# Patient Record
Sex: MALE | Race: WHITE | Employment: OTHER | URBAN - METROPOLITAN AREA
[De-identification: names, ages, dates, MRNs, and addresses within clinical notes are randomized per-mention and may not be internally consistent; named-entity substitution may affect disease eponyms.]

---

## 2022-10-25 ENCOUNTER — TELEPHONE (OUTPATIENT)
Dept: PRIMARY CARE CLINIC | Age: 52
End: 2022-10-25

## 2022-10-25 ENCOUNTER — OFFICE VISIT (OUTPATIENT)
Dept: PRIMARY CARE CLINIC | Age: 52
End: 2022-10-25
Payer: MEDICAID

## 2022-10-25 VITALS
HEART RATE: 67 BPM | TEMPERATURE: 98.4 F | RESPIRATION RATE: 20 BRPM | SYSTOLIC BLOOD PRESSURE: 138 MMHG | DIASTOLIC BLOOD PRESSURE: 82 MMHG | OXYGEN SATURATION: 99 %

## 2022-10-25 DIAGNOSIS — Z79.4 TYPE 2 DIABETES MELLITUS WITH DIABETIC POLYNEUROPATHY, WITH LONG-TERM CURRENT USE OF INSULIN (HCC): ICD-10-CM

## 2022-10-25 DIAGNOSIS — Z79.4 TYPE 2 DIABETES MELLITUS WITH DIABETIC POLYNEUROPATHY, WITH LONG-TERM CURRENT USE OF INSULIN (HCC): Primary | ICD-10-CM

## 2022-10-25 DIAGNOSIS — E11.42 TYPE 2 DIABETES MELLITUS WITH DIABETIC POLYNEUROPATHY, WITH LONG-TERM CURRENT USE OF INSULIN (HCC): ICD-10-CM

## 2022-10-25 DIAGNOSIS — Z23 NEEDS FLU SHOT: ICD-10-CM

## 2022-10-25 DIAGNOSIS — Z23 NEED FOR SHINGLES VACCINE: ICD-10-CM

## 2022-10-25 DIAGNOSIS — Z11.59 NEED FOR HEPATITIS C SCREENING TEST: ICD-10-CM

## 2022-10-25 DIAGNOSIS — Z11.4 ENCOUNTER FOR SCREENING FOR HIV: ICD-10-CM

## 2022-10-25 DIAGNOSIS — Z13.220 LIPID SCREENING: ICD-10-CM

## 2022-10-25 DIAGNOSIS — B99.9 ANEMIA OF INFECTION AND CHRONIC DISEASE: ICD-10-CM

## 2022-10-25 DIAGNOSIS — Z97.0 EYE GLOBE PROSTHESIS: ICD-10-CM

## 2022-10-25 DIAGNOSIS — Z76.89 ENCOUNTER TO ESTABLISH CARE: Primary | ICD-10-CM

## 2022-10-25 DIAGNOSIS — E11.42 TYPE 2 DIABETES MELLITUS WITH DIABETIC POLYNEUROPATHY, WITH LONG-TERM CURRENT USE OF INSULIN (HCC): Primary | ICD-10-CM

## 2022-10-25 DIAGNOSIS — D63.8 ANEMIA OF INFECTION AND CHRONIC DISEASE: ICD-10-CM

## 2022-10-25 DIAGNOSIS — M86.19 OTHER ACUTE OSTEOMYELITIS, MULTIPLE SITES (HCC): ICD-10-CM

## 2022-10-25 PROBLEM — S88.111A BELOW-KNEE AMPUTATION OF RIGHT LOWER EXTREMITY (HCC): Status: ACTIVE | Noted: 2021-12-16

## 2022-10-25 PROBLEM — N52.1 ERECTILE DYSFUNCTION DUE TO DIABETES MELLITUS (HCC): Status: ACTIVE | Noted: 2021-02-18

## 2022-10-25 PROBLEM — F19.10 POLYSUBSTANCE ABUSE (HCC): Status: ACTIVE | Noted: 2021-05-18

## 2022-10-25 PROBLEM — N18.6 ESRD (END STAGE RENAL DISEASE) (HCC): Status: ACTIVE | Noted: 2022-03-11

## 2022-10-25 PROBLEM — F17.210 NICOTINE DEPENDENCE, CIGARETTES, UNCOMPLICATED: Status: ACTIVE | Noted: 2021-01-19

## 2022-10-25 PROBLEM — E11.9 TYPE 2 DIABETES MELLITUS (HCC): Status: ACTIVE | Noted: 2018-12-31

## 2022-10-25 PROBLEM — E11.69 ERECTILE DYSFUNCTION DUE TO DIABETES MELLITUS (HCC): Status: ACTIVE | Noted: 2021-02-18

## 2022-10-25 PROBLEM — S88.112A BELOW-KNEE AMPUTATION OF LEFT LOWER EXTREMITY (HCC): Status: ACTIVE | Noted: 2022-04-21

## 2022-10-25 PROBLEM — F31.70 BIPOLAR I DISORDER IN REMISSION (HCC): Status: ACTIVE | Noted: 2021-09-20

## 2022-10-25 PROBLEM — J44.9 CHRONIC OBSTRUCTIVE PULMONARY DISEASE (HCC): Status: ACTIVE | Noted: 2019-09-23

## 2022-10-25 PROBLEM — R62.7 FAILURE TO THRIVE IN ADULT: Status: ACTIVE | Noted: 2022-03-11

## 2022-10-25 PROBLEM — B18.2 CHRONIC HEPATITIS C WITHOUT HEPATIC COMA (HCC): Status: ACTIVE | Noted: 2020-03-16

## 2022-10-25 PROCEDURE — 90472 IMMUNIZATION ADMIN EACH ADD: CPT

## 2022-10-25 PROCEDURE — 3017F COLORECTAL CA SCREEN DOC REV: CPT

## 2022-10-25 PROCEDURE — 3046F HEMOGLOBIN A1C LEVEL >9.0%: CPT

## 2022-10-25 PROCEDURE — G8421 BMI NOT CALCULATED: HCPCS

## 2022-10-25 PROCEDURE — 90750 HZV VACC RECOMBINANT IM: CPT

## 2022-10-25 PROCEDURE — 99204 OFFICE O/P NEW MOD 45 MIN: CPT

## 2022-10-25 PROCEDURE — 90471 IMMUNIZATION ADMIN: CPT

## 2022-10-25 PROCEDURE — 90674 CCIIV4 VAC NO PRSV 0.5 ML IM: CPT

## 2022-10-25 PROCEDURE — 2022F DILAT RTA XM EVC RTNOPTHY: CPT

## 2022-10-25 PROCEDURE — 4004F PT TOBACCO SCREEN RCVD TLK: CPT

## 2022-10-25 PROCEDURE — G8427 DOCREV CUR MEDS BY ELIG CLIN: HCPCS

## 2022-10-25 PROCEDURE — G8482 FLU IMMUNIZE ORDER/ADMIN: HCPCS

## 2022-10-25 RX ORDER — TAMSULOSIN HYDROCHLORIDE 0.4 MG/1
0.4 CAPSULE ORAL DAILY
COMMUNITY
Start: 2021-02-11 | End: 2022-11-21 | Stop reason: SDUPTHER

## 2022-10-25 RX ORDER — PREGABALIN 150 MG/1
150 CAPSULE ORAL 2 TIMES DAILY
COMMUNITY
Start: 2022-09-30 | End: 2022-11-21 | Stop reason: SDUPTHER

## 2022-10-25 RX ORDER — INSULIN LISPRO 100 [IU]/ML
8 INJECTION, SOLUTION INTRAVENOUS; SUBCUTANEOUS
Qty: 43.2 ML | Refills: 0 | Status: SHIPPED | OUTPATIENT
Start: 2022-10-25 | End: 2022-11-16 | Stop reason: SDUPTHER

## 2022-10-25 RX ORDER — FLASH GLUCOSE SENSOR
1 KIT MISCELLANEOUS 4 TIMES DAILY
Qty: 1 EACH | Refills: 12 | Status: SHIPPED | OUTPATIENT
Start: 2022-10-25 | End: 2022-11-16 | Stop reason: SDUPTHER

## 2022-10-25 RX ORDER — BUPRENORPHINE HYDROCHLORIDE AND NALOXONE HYDROCHLORIDE DIHYDRATE 8; 2 MG/1; MG/1
1 TABLET SUBLINGUAL 2 TIMES DAILY
COMMUNITY
Start: 2022-09-20

## 2022-10-25 RX ORDER — ROPINIROLE 2 MG/1
2 TABLET, FILM COATED, EXTENDED RELEASE ORAL NIGHTLY
COMMUNITY

## 2022-10-25 RX ORDER — INSULIN GLARGINE 100 [IU]/ML
18 INJECTION, SOLUTION SUBCUTANEOUS NIGHTLY
Qty: 5 ADJUSTABLE DOSE PRE-FILLED PEN SYRINGE | Refills: 3 | Status: SHIPPED | OUTPATIENT
Start: 2022-10-25 | End: 2022-11-16 | Stop reason: SDUPTHER

## 2022-10-25 ASSESSMENT — ENCOUNTER SYMPTOMS
SHORTNESS OF BREATH: 0
COUGH: 0
DIARRHEA: 0
CHEST TIGHTNESS: 0
COLOR CHANGE: 1
VOMITING: 0
ABDOMINAL PAIN: 0
CONSTIPATION: 0
WHEEZING: 0
BLOOD IN STOOL: 0
NAUSEA: 0

## 2022-10-25 ASSESSMENT — PATIENT HEALTH QUESTIONNAIRE - PHQ9
1. LITTLE INTEREST OR PLEASURE IN DOING THINGS: 0
SUM OF ALL RESPONSES TO PHQ QUESTIONS 1-9: 0
2. FEELING DOWN, DEPRESSED OR HOPELESS: 0
SUM OF ALL RESPONSES TO PHQ QUESTIONS 1-9: 0
SUM OF ALL RESPONSES TO PHQ9 QUESTIONS 1 & 2: 0

## 2022-10-25 NOTE — TELEPHONE ENCOUNTER
[address] is requesting Rx for 5 mm pen needles. Pharmacist states she is dispensing #100 pen needles to pt due to Rx for Humalog and Lantus being sent.

## 2022-10-25 NOTE — PROGRESS NOTES
Matt Escalante (:  1970) is a 47 y.o. male,New patient, here for evaluation of the following chief complaint(s):  Establish Care      HPI  Patient presents to establish care with new provider as well as for the following:  Diabetes - patient states has been off of diabetes medications for few months since abrupt move to area, as well as no longer has access to his BG monitor or supplies. Patient states needing prescriptions for these again for diabetes management. Last A1c in . with good control. Osteomyelitis - Patient reports hx osteomyelitis bilateral LEs, patient is double amputee with bilateral BKAs. Patient was getting IV Orbactiv infusions once weekly per [facility], but states recent infusions have been off schedule or missed altogether which he feels caused infection to get worse. Patient states bilateral stumps have been erythematous, hot, swollen, as well as having drainage and flaking of the skin. Patient denies fever, N/V/D and states he otherwise feels well. Flu shot - will administer today  Shingles shot - will administer today    ASSESSMENT/PLAN:  1. Encounter to establish care  2. Other acute osteomyelitis, multiple sites [facility])  -     [address] [place] [address], Prabhjot Murphy MD, Infectious Disease, [facility]  -     [address]  3. Type 2 diabetes mellitus with diabetic polyneuropathy, with long-term current use of [facility] ([facility]  Assessment & Plan:  Repeat A1c ordered - will f/u with result. Rx for Lantus and Humalog provided, new rx for BG monitor provided. Orders:  -     Hemoglobin A1C; Future  -     Continuous Blood Gluc Sensor (FREESTYLE MARCELLA 2 SENSOR) MISC; 1 each by Does not apply route 4 times daily, Disp-1 each, R-12Normal  -     insulin glargine (LANTUS SOLOSTAR) 100 UNIT/ML injection pen;  Inject 18 Units into the skin nightly, Disp-5 Adjustable Dose Pre-filled Pen Syringe, R-3Normal  -     insulin lispro, 1 Unit Dial, (HUMALOG KWIKPEN) 100 UNIT/ML SOPN; Inject 8 Units into the skin 3 times daily (before meals), Disp-43.2 mL, R-0Normal  4. Eye globe prosthesis  -     Genoveva Johnson MD, Ophthalmology, [facility]  5. Anemia of infection and chronic disease  -     CBC with Auto Differential; Future  -     Comprehensive Metabolic Panel; Future  6. Lipid screening  -     Lipid, Fasting; Future  7. Needs flu shot  -     Influenza, FLUCELVAX, (age 10 mo+), IM, PF, 0.5 mL  8. Need for shingles vaccine  -     Zoster, SHINGRIX, (18 yrs +), IM  9. Encounter for screening for HIV  -     HIV Screen; Future  10. Need for hepatitis C screening test  -     Hepatitis C Antibody; Future     /82   Pulse 67   Temp 98.4 °F (36.9 °C) (Oral)   Resp 20   SpO2 99%      SUBJECTIVE/OBJECTIVE:  Review of Systems   Constitutional:  Negative for chills, fatigue, fever and unexpected weight change. Eyes:         Right eye removed   Respiratory:  Negative for cough, chest tightness, shortness of breath and wheezing. Cardiovascular:  Negative for chest pain, palpitations and leg swelling. Gastrointestinal:  Negative for abdominal pain, blood in stool, constipation, diarrhea, nausea and vomiting. Skin:  Positive for color change (Erythema and swelling to bilateral stumps, +drainage). Neurological:  Negative for dizziness, weakness, light-headedness and headaches. Physical Exam  Vitals and nursing note reviewed. Constitutional:       General: He is not in acute distress. Appearance: Normal appearance. He is not ill-appearing. Cardiovascular:      Rate and Rhythm: Normal rate and regular rhythm. Pulses: Normal pulses. Heart sounds: Normal heart sounds. Pulmonary:      Effort: Pulmonary effort is normal.      Breath sounds: Normal breath sounds. Musculoskeletal:         General: Swelling (To stumps, +erythema) present. Comments: Bilateral BKA   Skin:     General: Skin is warm and dry. Capillary Refill: Capillary refill takes less than 2 seconds. Neurological:      Mental Status: He is alert and oriented to person, place, and time. Mental status is at baseline. Psychiatric:         Mood and Affect: Mood normal.         Behavior: Behavior normal.         Thought Content: Thought content normal.         Judgment: Judgment normal.       Current Outpatient Medications   Medication Sig Dispense Refill    pregabalin (LYRICA) 150 MG capsule Take 150 mg by mouth 2 times daily. buprenorphine-naloxone (SUBOXONE) 8-2 MG SUBL SL tablet Place 1 tablet under the tongue 2 times daily. tamsulosin (FLOMAX) 0.4 MG capsule Take 0.4 mg by mouth daily      Continuous Blood Gluc Sensor (FREESTYLE MARCELLA 2 SENSOR) MISC 1 each by Does not apply route 4 times daily 1 each 12    insulin glargine (LANTUS SOLOSTAR) 100 UNIT/ML injection pen Inject 18 Units into the skin nightly 5 Adjustable Dose Pre-filled Pen Syringe 3    insulin lispro, 1 Unit Dial, (HUMALOG KWIKPEN) 100 UNIT/ML SOPN Inject 8 Units into the skin 3 times daily (before meals) 43.2 mL 0    rOPINIRole (REQUIP XL) 2 MG extended release tablet Take 2 mg by mouth nightly       No current facility-administered medications for this visit. Return in about 3 months (around 1/25/2023), or if symptoms worsen or fail to improve, for HTN, diabetes.     Electronically signed by RG Byrd CNP on 10/25/2022 at 2:02 PM

## 2022-10-25 NOTE — ASSESSMENT & PLAN NOTE
Repeat A1c ordered - will f/u with result. Rx for Lantus and Humalog provided, new rx for BG monitor provided.

## 2022-10-26 ENCOUNTER — TELEPHONE (OUTPATIENT)
Dept: PRIMARY CARE CLINIC | Age: 52
End: 2022-10-26

## 2022-10-26 NOTE — TELEPHONE ENCOUNTER
[facility] is calling about referral that was sent over about the eye glove prosthesis they would like to know if it is for the whole or if it is just for the orbit  if it is the whole the pt would need to be referred to someone else

## 2022-10-28 DIAGNOSIS — E11.42 TYPE 2 DIABETES MELLITUS WITH DIABETIC POLYNEUROPATHY, WITH LONG-TERM CURRENT USE OF INSULIN (HCC): ICD-10-CM

## 2022-10-28 DIAGNOSIS — B99.9 ANEMIA OF INFECTION AND CHRONIC DISEASE: ICD-10-CM

## 2022-10-28 DIAGNOSIS — Z11.4 ENCOUNTER FOR SCREENING FOR HIV: ICD-10-CM

## 2022-10-28 DIAGNOSIS — Z11.59 NEED FOR HEPATITIS C SCREENING TEST: ICD-10-CM

## 2022-10-28 DIAGNOSIS — Z79.4 TYPE 2 DIABETES MELLITUS WITH DIABETIC POLYNEUROPATHY, WITH LONG-TERM CURRENT USE OF INSULIN (HCC): ICD-10-CM

## 2022-10-28 DIAGNOSIS — D63.8 ANEMIA OF INFECTION AND CHRONIC DISEASE: ICD-10-CM

## 2022-10-28 LAB
A/G RATIO: 0.8 (ref 1.1–2.2)
ALBUMIN SERPL-MCNC: 3 G/DL (ref 3.4–5)
ALP BLD-CCNC: 94 U/L (ref 40–129)
ALT SERPL-CCNC: 8 U/L (ref 10–40)
ANION GAP SERPL CALCULATED.3IONS-SCNC: 10 MMOL/L (ref 3–16)
AST SERPL-CCNC: 11 U/L (ref 15–37)
BASOPHILS ABSOLUTE: 0.1 K/UL (ref 0–0.2)
BASOPHILS RELATIVE PERCENT: 1.4 %
BILIRUB SERPL-MCNC: <0.2 MG/DL (ref 0–1)
BUN BLDV-MCNC: 25 MG/DL (ref 7–20)
CALCIUM SERPL-MCNC: 8.7 MG/DL (ref 8.3–10.6)
CHLORIDE BLD-SCNC: 107 MMOL/L (ref 99–110)
CO2: 21 MMOL/L (ref 21–32)
CREAT SERPL-MCNC: 1 MG/DL (ref 0.9–1.3)
EOSINOPHILS ABSOLUTE: 0.3 K/UL (ref 0–0.6)
EOSINOPHILS RELATIVE PERCENT: 4.4 %
GFR SERPL CREATININE-BSD FRML MDRD: >60 ML/MIN/{1.73_M2}
GLUCOSE BLD-MCNC: 186 MG/DL (ref 70–99)
HCT VFR BLD CALC: 31.4 % (ref 40.5–52.5)
HEMOGLOBIN: 10.9 G/DL (ref 13.5–17.5)
LYMPHOCYTES ABSOLUTE: 1.5 K/UL (ref 1–5.1)
LYMPHOCYTES RELATIVE PERCENT: 18.4 %
MCH RBC QN AUTO: 30 PG (ref 26–34)
MCHC RBC AUTO-ENTMCNC: 34.5 G/DL (ref 31–36)
MCV RBC AUTO: 86.9 FL (ref 80–100)
MONOCYTES ABSOLUTE: 0.4 K/UL (ref 0–1.3)
MONOCYTES RELATIVE PERCENT: 4.7 %
NEUTROPHILS ABSOLUTE: 5.7 K/UL (ref 1.7–7.7)
NEUTROPHILS RELATIVE PERCENT: 71.1 %
PDW BLD-RTO: 13.7 % (ref 12.4–15.4)
PLATELET # BLD: 239 K/UL (ref 135–450)
PMV BLD AUTO: 8.3 FL (ref 5–10.5)
POTASSIUM SERPL-SCNC: 5.5 MMOL/L (ref 3.5–5.1)
RBC # BLD: 3.62 M/UL (ref 4.2–5.9)
SODIUM BLD-SCNC: 138 MMOL/L (ref 136–145)
TOTAL PROTEIN: 6.9 G/DL (ref 6.4–8.2)
WBC # BLD: 8 K/UL (ref 4–11)

## 2022-10-28 NOTE — DISCHARGE INSTRUCTIONS
[facility], [address] [place] [address] Road  Telephone: [phone]    Discharge Instructions    Important reminders:    **If you have any signs and symptoms of illness (Cough, fever, congestion, nausea, vomiting, diarrhea, etc.) please call the wound care center prior to your appointment. 1. Increase Protein intake for optimal wound healing  2. No added salt to reduce any swelling  3. If diabetic, maintain good glucose control  4. If you smoke, smoking prohibits wound healing, we ask that you refrain from smoking. 5. When taking antibiotics take the entire prescription as ordered. Do not stop taking until medication is all gone unless otherwise instructed. 6. Exercise as tolerated. 7. Keep weight off wounds and reposition every 2 hours if applicable. 8. If wound(s) is on your lower extremity, elevate legs to the level of the heart or above for 30 minutes 4-5 times a day and/or when sitting. Avoid standing for long periods of time. 9. Do not get wounds wet in bath or shower unless otherwise instructed by your physician. If your wound is on your foot or leg, you may purchase a cast bag. Please ask at the pharmacy. If Vascular testing is ordered, please call [address] [place] [address] [phone]) to schedule. Vascular tests ordered by Wound Care Physicians may take up to 2 hours to complete. Please keep that in mind when scheduling. If Vascular testing is scheduled, please bring supplies to replace your dressing after testing is done. The vascular department does not stock supplies. Wound: Right lower extremity, Left lower extremity     With each dressing change, rinse wounds with 0.9% Saline. (May use wound wash or soft contact solution. Both can be purchased at a local drug store). If unable to obtain saline, may use a gentle soap and water. Dressing care: Right leg- Antibiotic ointment, bandaid- change daily.  Left leg- pack with Collagen, 4 x 4's & tape- change daily. Dr Inocente Kelley  Phone: [phone]  Fax: [phone]    Abilities in [address] [address]  [phone]      Home Care Agency/Facility:     Your wound-care supplies will be provided by: [address] f: [phone] f: [phone] p: [phone] [email or web address]    Please note, depending on your insurance coverage, you may have out-of-pocket expenses for these supplies. Someone from the company should call you to confirm your order and discuss those potential costs before they ship your products -- please anticipate that call. If your out-of-pocket cost could be substantial, Many companies have financial hardship programs for patients who qualify, so please ask about that if you might need a hand. If you have any questions about your supplies or your potential out-of-pocket costs, or if you need to place an order for a refill of supplies (typically monthly), please call the company directly. Your  is Johnny Escamilla    Follow up with Dr Brant De Santiago In 1 week(s) in the wound care center. Wound Care Center Information: Should you experience any significant changes in your wound(s) or have questions about your wound care, please [facility]  at [phone] Monday  - Thursday 8:00 am - 4:00 pm and Friday 8:00 am - 1:00pm. If you need help with your wound outside these hours and cannot wait until we are again available, contact your PCP or go to the hospital emergency room. PLEASE NOTE: IF YOU ARE UNABLE TO OBTAIN WOUND SUPPLIES, CONTINUE TO USE THE SUPPLIES YOU HAVE AVAILABLE UNTIL YOU ARE ABLE TO REACH US. IT IS MOST IMPORTANT TO KEEP THE WOUND COVERED AT ALL TIMES. Patient Experience    Thank you for trusting us with your care. You may receive a survey from a company called [employer] asking for your feedback.   We would appreciate it if you took a few minutes to share your experience. Your input is very valuable to us.

## 2022-10-29 LAB
ESTIMATED AVERAGE GLUCOSE: 122.6 MG/DL
HBA1C MFR BLD: 5.9 %
HEPATITIS C ANTIBODY INTERPRETATION: REACTIVE

## 2022-10-31 ENCOUNTER — HOSPITAL ENCOUNTER (OUTPATIENT)
Dept: WOUND CARE | Age: 52
Discharge: HOME | End: 2022-10-31
Payer: MEDICAID

## 2022-10-31 VITALS
RESPIRATION RATE: 18 BRPM | TEMPERATURE: 97.8 F | SYSTOLIC BLOOD PRESSURE: 147 MMHG | HEART RATE: 76 BPM | DIASTOLIC BLOOD PRESSURE: 80 MMHG

## 2022-10-31 DIAGNOSIS — Z97.0 EYE GLOBE PROSTHESIS: Primary | ICD-10-CM

## 2022-10-31 DIAGNOSIS — L97.912 CHRONIC ULCER OF RIGHT LEG WITH FAT LAYER EXPOSED (HCC): Primary | ICD-10-CM

## 2022-10-31 PROCEDURE — 11042 DBRDMT SUBQ TIS 1ST 20SQCM/<: CPT

## 2022-10-31 PROCEDURE — 99213 OFFICE O/P EST LOW 20 MIN: CPT

## 2022-10-31 RX ORDER — LIDOCAINE HYDROCHLORIDE 20 MG/ML
JELLY TOPICAL ONCE
OUTPATIENT
Start: 2022-10-31 | End: 2022-10-31

## 2022-10-31 RX ORDER — LIDOCAINE 50 MG/G
OINTMENT TOPICAL ONCE
OUTPATIENT
Start: 2022-10-31 | End: 2022-10-31

## 2022-10-31 RX ORDER — BACITRACIN ZINC AND POLYMYXIN B SULFATE 500; 1000 [USP'U]/G; [USP'U]/G
OINTMENT TOPICAL ONCE
Status: CANCELLED | OUTPATIENT
Start: 2022-10-31 | End: 2022-10-31

## 2022-10-31 RX ORDER — LIDOCAINE HYDROCHLORIDE 20 MG/ML
JELLY TOPICAL ONCE
Status: CANCELLED | OUTPATIENT
Start: 2022-10-31 | End: 2022-10-31

## 2022-10-31 RX ORDER — LIDOCAINE 50 MG/G
OINTMENT TOPICAL ONCE
Status: CANCELLED | OUTPATIENT
Start: 2022-10-31 | End: 2022-10-31

## 2022-10-31 RX ORDER — LIDOCAINE 40 MG/G
CREAM TOPICAL ONCE
Status: CANCELLED | OUTPATIENT
Start: 2022-10-31 | End: 2022-10-31

## 2022-10-31 RX ORDER — LIDOCAINE 40 MG/G
CREAM TOPICAL ONCE
Status: DISCONTINUED | OUTPATIENT
Start: 2022-10-31 | End: 2022-11-01 | Stop reason: HOSPADM

## 2022-10-31 RX ORDER — GINSENG 100 MG
CAPSULE ORAL ONCE
Status: CANCELLED | OUTPATIENT
Start: 2022-10-31 | End: 2022-10-31

## 2022-10-31 RX ORDER — LIDOCAINE HYDROCHLORIDE 40 MG/ML
SOLUTION TOPICAL ONCE
OUTPATIENT
Start: 2022-10-31 | End: 2022-10-31

## 2022-10-31 RX ORDER — CLOBETASOL PROPIONATE 0.5 MG/G
OINTMENT TOPICAL ONCE
OUTPATIENT
Start: 2022-10-31 | End: 2022-10-31

## 2022-10-31 RX ORDER — GINSENG 100 MG
CAPSULE ORAL ONCE
OUTPATIENT
Start: 2022-10-31 | End: 2022-10-31

## 2022-10-31 RX ORDER — BACITRACIN, NEOMYCIN, POLYMYXIN B 400; 3.5; 5 [USP'U]/G; MG/G; [USP'U]/G
OINTMENT TOPICAL ONCE
OUTPATIENT
Start: 2022-10-31 | End: 2022-10-31

## 2022-10-31 RX ORDER — BACITRACIN ZINC AND POLYMYXIN B SULFATE 500; 1000 [USP'U]/G; [USP'U]/G
OINTMENT TOPICAL ONCE
OUTPATIENT
Start: 2022-10-31 | End: 2022-10-31

## 2022-10-31 RX ORDER — BETAMETHASONE DIPROPIONATE 0.05 %
OINTMENT (GRAM) TOPICAL ONCE
OUTPATIENT
Start: 2022-10-31 | End: 2022-10-31

## 2022-10-31 RX ORDER — LIDOCAINE HYDROCHLORIDE 40 MG/ML
SOLUTION TOPICAL ONCE
Status: CANCELLED | OUTPATIENT
Start: 2022-10-31 | End: 2022-10-31

## 2022-10-31 RX ORDER — BACITRACIN, NEOMYCIN, POLYMYXIN B 400; 3.5; 5 [USP'U]/G; MG/G; [USP'U]/G
OINTMENT TOPICAL ONCE
Status: CANCELLED | OUTPATIENT
Start: 2022-10-31 | End: 2022-10-31

## 2022-10-31 RX ORDER — GENTAMICIN SULFATE 1 MG/G
OINTMENT TOPICAL ONCE
Status: CANCELLED | OUTPATIENT
Start: 2022-10-31 | End: 2022-10-31

## 2022-10-31 RX ORDER — BETAMETHASONE DIPROPIONATE 0.05 %
OINTMENT (GRAM) TOPICAL ONCE
Status: CANCELLED | OUTPATIENT
Start: 2022-10-31 | End: 2022-10-31

## 2022-10-31 RX ORDER — GENTAMICIN SULFATE 1 MG/G
OINTMENT TOPICAL ONCE
OUTPATIENT
Start: 2022-10-31 | End: 2022-10-31

## 2022-10-31 RX ORDER — CLOBETASOL PROPIONATE 0.5 MG/G
OINTMENT TOPICAL ONCE
Status: CANCELLED | OUTPATIENT
Start: 2022-10-31 | End: 2022-10-31

## 2022-10-31 NOTE — PROGRESS NOTES
[facility] Floor:      [address] f: [phone] f: [phone] p: [phone] [email or web address]     Ordering Center: Milotn Sanford [address]  [facility] [address]  [phone]  Dept: [phone]   Fax# [phone]    Patient Information:      Roseann Brody  [address] [address]   [phone]   : 1970  AGE: 47 y.o. GENDER: male   TODAYS DATE:  10/31/2022    Insurance:      PRIMARY INSURANCE:  Plan: [employer] [facility] [place]  Coverage: [place] MEDICAID  Effective Date: 2022  Group Number: [unfilled]  Subscriber Number: [id number] - (Medicaid Managed)    Payer/Plan Subscr  Sex Relation Sub. Ins. ID Effective Group Num   1.  [address] 1970 Male Self [id number]                                   [address] [facility]         Patient Wound Information:     Additional ICD-10 Codes:     Patient Active Problem List   Diagnosis Code    Chronic obstructive pulmonary disease ([address] J44.9    Diabetic polyneuropathy associated with type 2 diabetes mellitus ([address] E11.42    Erectile dysfunction due to diabetes mellitus ([address] E11.69, N52.1    ESRD (end stage renal disease) ([address] N18.6    Failure to thrive in adult R62.7    Type 2 diabetes mellitus ([address] E11.9    Below-knee amputation of left lower extremity ([address] N32.484Q    Below-knee amputation of right lower extremity (HCC) B76.633W    Anemia of infection and chronic disease B99.9, D63.8    Bipolar I disorder in remission ([address] F31.70    Chronic hepatitis C without hepatic coma ([address] B18.2    Other acute osteomyelitis, multiple sites ([address] M86.19    Nicotine dependence, cigarettes, uncomplicated S65.768    Polysubstance abuse ([address] F19.10       WOUNDS REQUIRING DRESSING SUPPLIES:     Wound 10/31/22 Leg Right;Distal #1 (Active)   Wound Image   10/31/22 1417   Wound Etiology Non-Healing Surgical 10/31/22 1414 Wound Cleansed Wound cleanser 10/31/22 1417   Wound Length (cm) 0.3 cm 10/31/22 1417   Wound Width (cm) 0.2 cm 10/31/22 1417   Wound Depth (cm) 0.1 cm 10/31/22 1417   Wound Surface Area (cm^2) 0.06 cm^2 10/31/22 1417   Wound Volume (cm^3) 0.006 cm^3 10/31/22 1417   Wound Assessment Granulation tissue 10/31/22 1417   Drainage Amount Small 10/31/22 1417   Drainage Description Serosanguinous 10/31/22 1417   Odor None 10/31/22 1417   Verónica-wound Assessment Intact 10/31/22 1417   Margins Attached edges 10/31/22 1417   Number of days: 0       Wound 10/31/22 Leg Left;Distal #2 (Active)   Wound Image   10/31/22 1417   Wound Etiology Non-Healing Surgical 10/31/22 1417   Wound Cleansed Cleansed with saline 10/31/22 1417   Offloading for Diabetic Foot Ulcers Offloading not required 10/31/22 1417   Wound Length (cm) 0.2 cm 10/31/22 1417   Wound Width (cm) 0.2 cm 10/31/22 1417   Wound Depth (cm) 1.5 cm 10/31/22 1417   Wound Surface Area (cm^2) 0.04 cm^2 10/31/22 1417   Wound Volume (cm^3) 0.06 cm^3 10/31/22 1417   Post-Procedure Length (cm) 0.2 cm 10/31/22 1430   Post-Procedure Width (cm) 0.2 cm 10/31/22 1430   Post-Procedure Depth (cm) 1.5 cm 10/31/22 1430   Post-Procedure Surface Area (cm^2) 0.04 cm^2 10/31/22 1430   Post-Procedure Volume (cm^3) 0.06 cm^3 10/31/22 1430   Wound Assessment Bleeding 10/31/22 1430   Drainage Amount Moderate 10/31/22 1430   Drainage Description Purulent 10/31/22 1417   Odor None 10/31/22 1417   Verónica-wound Assessment Intact 10/31/22 1417   Margins Defined edges; Attached edges 10/31/22 1417   Number of days: 0          Supplies Requested :      DISPENSE AS WRITTEN    WOUND #: 2   PRIMARY DRESSING:    Collagen    Cover and Secure with: 4X4 non woven gauze pad     FREQUENCY OF DRESSING CHANGES:  Daily    Wound Thickness [x] Full   []Partial     Patient Wound(s) Debrided: [x] Yes   [] No    Debridement Date: 10/31/2022    Debribement Type: Excisional/Sharp    ADDITIONAL ITEMS:  [] Gloves Small  [x] Gloves Medium [] Gloves Large [] Gloves Rupert Verónica [] Paper Tape 2\" [] Paper Tape 3\"  [] Medipore Tape 3\" [x] Medipore Tape 4\"    [] Hypofix skin sensitive tape 2\"  [] Hypofix skin sensitive tape 4\"  [x] Saline  [x] Skin Prep   [] Adhesive Remover   [x] Cotton Tip Applicators  [] Tubular Stocking   [] Size E  [] Size G  [] Other:    Patient currently being seen by Home Health: [] Yes   [x] No    Quantity of days dispensed:  [x]15  []30  []60  []90 Days    Order valid for 90 days    Provider Information:      PROVIDER'S NAME/NPI  Simin Sparks DPM NPI: [id number]   I give permission to coordinate the care for this patient

## 2022-10-31 NOTE — PROGRESS NOTES
[facility] [address]  Progress Note and Procedure Note      Roseann Brody  AGE: 47 y.o. GENDER: male  : 1970  TODAY'S DATE:  10/31/2022    Subjective:     Chief Complaint   Patient presents with    Wound Check     Recently moved to area. WOunds Bilateral BKA Sites         HISTORY of PRESENT ILLNESS HPI     Roseann Brody is a 47 y.o. male who presents today for wound evaluation. History of Wound: B/l bka stump ulcers. Wound Pain:  intermittent  Severity:  2 / 10   Wound Type:  diabetic  Modifying Factors:  edema, diabetes, poor glucose control, and decreased mobility  Associated Signs/Symptoms:  edema and drainage        PAST MEDICAL HISTORY    History reviewed. No pertinent past medical history. PAST SURGICAL HISTORY    History reviewed. No pertinent surgical history. FAMILY HISTORY    History reviewed. No pertinent family history. SOCIAL HISTORY    Social History     Tobacco Use    Smoking status: Every Day     Packs/day: 0.50     Years: 40.00     Pack years: 20.00     Types: Cigarettes    Smokeless tobacco: Never   Substance Use Topics    Alcohol use: Not Currently    Drug use: Not Currently       ALLERGIES    No Known Allergies    MEDICATIONS    Current Outpatient Medications on File Prior to Encounter   Medication Sig Dispense Refill    pregabalin (LYRICA) 150 MG capsule Take 150 mg by mouth 2 times daily. buprenorphine-naloxone (SUBOXONE) 8-2 MG SUBL SL tablet Place 1 tablet under the tongue 2 times daily.       rOPINIRole (REQUIP XL) 2 MG extended release tablet Take 2 mg by mouth nightly      tamsulosin (FLOMAX) 0.4 MG capsule Take 0.4 mg by mouth daily      Continuous Blood Gluc Sensor (FREESTYLE MARCELLA 2 SENSOR) MISC 1 each by Does not apply route 4 times daily 1 each 12    insulin glargine (LANTUS SOLOSTAR) 100 UNIT/ML injection pen Inject 18 Units into the skin nightly 5 Adjustable Dose Pre-filled Pen Syringe 3    insulin lispro, 1 Unit Dial, (HUMALOG KWIKPEN) 100 Measurements:  Are located in the Wound Documentation Flow Sheet    Wound #: 2     Post  Debridement Measurements:  Wound 10/31/22 Leg Right;Distal #1 (Active)   Wound Image   10/31/22 1417   Wound Etiology Non-Healing Surgical 10/31/22 1417   Wound Cleansed Wound cleanser 10/31/22 1417   Wound Length (cm) 0.3 cm 10/31/22 1417   Wound Width (cm) 0.2 cm 10/31/22 1417   Wound Depth (cm) 0.1 cm 10/31/22 1417   Wound Surface Area (cm^2) 0.06 cm^2 10/31/22 1417   Wound Volume (cm^3) 0.006 cm^3 10/31/22 1417   Wound Assessment Granulation tissue 10/31/22 1417   Drainage Amount Small 10/31/22 1417   Drainage Description Serosanguinous 10/31/22 1417   Odor None 10/31/22 1417   Verónica-wound Assessment Intact 10/31/22 1417   Margins Attached edges 10/31/22 1417   Number of days: 0       Wound 10/31/22 Leg Left;Distal #2 (Active)   Wound Image   10/31/22 1417   Wound Etiology Non-Healing Surgical 10/31/22 1417   Wound Cleansed Cleansed with saline 10/31/22 1417   Offloading for Diabetic Foot Ulcers Offloading not required 10/31/22 1417   Wound Length (cm) 0.2 cm 10/31/22 1417   Wound Width (cm) 0.2 cm 10/31/22 1417   Wound Depth (cm) 1.5 cm 10/31/22 1417   Wound Surface Area (cm^2) 0.04 cm^2 10/31/22 1417   Wound Volume (cm^3) 0.06 cm^3 10/31/22 1417   Wound Assessment Devitalized tissue 10/31/22 1417   Drainage Amount Moderate 10/31/22 1417   Drainage Description Purulent 10/31/22 1417   Odor None 10/31/22 1417   Verónica-wound Assessment Intact 10/31/22 1417   Margins Defined edges; Attached edges 10/31/22 1417   Number of days: 0           Total Surface Area Debrided:  0.1 sq cm     Percentage of wound debrided 100%    Bleeding:  Minimal    Hemostasis Achieved:  by pressure    Procedural Pain:  1  / 10     Post Procedural Pain:  1 / 10     Response to treatment:  Well tolerated by patient. Plan:     The nature of the patient's condition was explained in depth.  The patient was informed that their compliance to the treatment plan is paramount to successful healing and prevention of further ulceration and/or infection     Discharge Treatment      Reviewed previous noted and xrays  Wound debrided and R dressed with abx ointment L dress with collagen   Referral to ID to evaluate potential need for long term abx.      Written Patient Discharge Instructions Given            Electronically signed by Lexis Garg DPM on 10/31/2022 at 2:26 PM

## 2022-10-31 NOTE — PLAN OF CARE
Discharge instructions given. Patient verbalized understanding. Return to [facility] in 1 week(s).   Called/faxed orders to  [facility]

## 2022-11-01 LAB — MISCELLANEOUS LAB TEST ORDER: NORMAL

## 2022-11-03 ENCOUNTER — TELEPHONE (OUTPATIENT)
Dept: INFECTIOUS DISEASES | Age: 52
End: 2022-11-03

## 2022-11-03 ENCOUNTER — OFFICE VISIT (OUTPATIENT)
Dept: INFECTIOUS DISEASES | Age: 52
End: 2022-11-03
Payer: MEDICAID

## 2022-11-03 VITALS
SYSTOLIC BLOOD PRESSURE: 176 MMHG | DIASTOLIC BLOOD PRESSURE: 100 MMHG | OXYGEN SATURATION: 98 % | TEMPERATURE: 97.6 F | HEART RATE: 71 BPM | WEIGHT: 160 LBS

## 2022-11-03 DIAGNOSIS — Z89.512 S/P BKA (BELOW KNEE AMPUTATION) UNILATERAL, LEFT (HCC): ICD-10-CM

## 2022-11-03 DIAGNOSIS — Z71.51 DRUG ABUSE COUNSELING AND SURVEILLANCE OF DRUG ABUSER: ICD-10-CM

## 2022-11-03 DIAGNOSIS — M86.9 OSTEOMYELITIS OF LEFT TIBIA, UNSPECIFIED TYPE (HCC): ICD-10-CM

## 2022-11-03 DIAGNOSIS — T87.43 RIGHT BKA INFECTION (HCC): ICD-10-CM

## 2022-11-03 DIAGNOSIS — B18.2 CHRONIC HEPATITIS C WITHOUT HEPATIC COMA (HCC): ICD-10-CM

## 2022-11-03 DIAGNOSIS — F19.90 IVDU (INTRAVENOUS DRUG USER): ICD-10-CM

## 2022-11-03 DIAGNOSIS — Z71.89 ENCOUNTER FOR MEDICATION COUNSELING: ICD-10-CM

## 2022-11-03 DIAGNOSIS — Z86.59 HISTORY OF BIPOLAR DISORDER: ICD-10-CM

## 2022-11-03 DIAGNOSIS — Z11.4 SCREENING FOR HIV (HUMAN IMMUNODEFICIENCY VIRUS): ICD-10-CM

## 2022-11-03 DIAGNOSIS — Z51.81 THERAPEUTIC DRUG MONITORING: ICD-10-CM

## 2022-11-03 DIAGNOSIS — F14.11 HISTORY OF COCAINE ABUSE (HCC): ICD-10-CM

## 2022-11-03 DIAGNOSIS — Z90.01 H/O ENUCLEATION OF RIGHT EYEBALL: ICD-10-CM

## 2022-11-03 DIAGNOSIS — A49.02 MRSA (METHICILLIN RESISTANT STAPHYLOCOCCUS AUREUS) INFECTION: ICD-10-CM

## 2022-11-03 DIAGNOSIS — Z71.6 TOBACCO ABUSE COUNSELING: ICD-10-CM

## 2022-11-03 DIAGNOSIS — E11.42 DIABETIC POLYNEUROPATHY ASSOCIATED WITH TYPE 2 DIABETES MELLITUS (HCC): Primary | ICD-10-CM

## 2022-11-03 DIAGNOSIS — M86.19 OTHER ACUTE OSTEOMYELITIS, MULTIPLE SITES (HCC): ICD-10-CM

## 2022-11-03 DIAGNOSIS — F17.200 SMOKER: ICD-10-CM

## 2022-11-03 PROCEDURE — G8421 BMI NOT CALCULATED: HCPCS | Performed by: INTERNAL MEDICINE

## 2022-11-03 PROCEDURE — 99205 OFFICE O/P NEW HI 60 MIN: CPT | Performed by: INTERNAL MEDICINE

## 2022-11-03 PROCEDURE — 4004F PT TOBACCO SCREEN RCVD TLK: CPT | Performed by: INTERNAL MEDICINE

## 2022-11-03 PROCEDURE — G8482 FLU IMMUNIZE ORDER/ADMIN: HCPCS | Performed by: INTERNAL MEDICINE

## 2022-11-03 PROCEDURE — 2022F DILAT RTA XM EVC RTNOPTHY: CPT | Performed by: INTERNAL MEDICINE

## 2022-11-03 PROCEDURE — 3017F COLORECTAL CA SCREEN DOC REV: CPT | Performed by: INTERNAL MEDICINE

## 2022-11-03 PROCEDURE — 3044F HG A1C LEVEL LT 7.0%: CPT | Performed by: INTERNAL MEDICINE

## 2022-11-03 PROCEDURE — G8427 DOCREV CUR MEDS BY ELIG CLIN: HCPCS | Performed by: INTERNAL MEDICINE

## 2022-11-03 RX ORDER — HEPARIN SODIUM (PORCINE) LOCK FLUSH IV SOLN 100 UNIT/ML 100 UNIT/ML
500 SOLUTION INTRAVENOUS PRN
Status: CANCELLED | OUTPATIENT
Start: 2022-11-07

## 2022-11-03 RX ORDER — LINEZOLID 600 MG/1
600 TABLET, FILM COATED ORAL 2 TIMES DAILY
Qty: 30 TABLET | Refills: 0 | Status: SHIPPED | OUTPATIENT
Start: 2022-11-03 | End: 2022-11-18

## 2022-11-03 RX ORDER — ACETAMINOPHEN 325 MG/1
650 TABLET ORAL
Status: CANCELLED | OUTPATIENT
Start: 2022-11-07

## 2022-11-03 RX ORDER — METRONIDAZOLE 500 MG/1
500 TABLET ORAL 3 TIMES DAILY
Qty: 90 TABLET | Refills: 0 | Status: SHIPPED | OUTPATIENT
Start: 2022-11-03 | End: 2022-12-03

## 2022-11-03 RX ORDER — SODIUM CHLORIDE 9 MG/ML
INJECTION, SOLUTION INTRAVENOUS CONTINUOUS
Status: CANCELLED | OUTPATIENT
Start: 2022-11-07

## 2022-11-03 RX ORDER — FAMOTIDINE 10 MG/ML
20 INJECTION, SOLUTION INTRAVENOUS
Status: CANCELLED | OUTPATIENT
Start: 2022-11-07

## 2022-11-03 RX ORDER — CIPROFLOXACIN 500 MG/1
500 TABLET, FILM COATED ORAL 2 TIMES DAILY
Qty: 60 TABLET | Refills: 0 | Status: SHIPPED | OUTPATIENT
Start: 2022-11-03 | End: 2022-12-03

## 2022-11-03 RX ORDER — SODIUM CHLORIDE 9 MG/ML
5-250 INJECTION, SOLUTION INTRAVENOUS PRN
Status: CANCELLED | OUTPATIENT
Start: 2022-11-07

## 2022-11-03 RX ORDER — DIPHENHYDRAMINE HYDROCHLORIDE 50 MG/ML
50 INJECTION INTRAMUSCULAR; INTRAVENOUS
Status: CANCELLED | OUTPATIENT
Start: 2022-11-07

## 2022-11-03 RX ORDER — ALBUTEROL SULFATE 90 UG/1
4 AEROSOL, METERED RESPIRATORY (INHALATION) PRN
Status: CANCELLED | OUTPATIENT
Start: 2022-11-07

## 2022-11-03 RX ORDER — SODIUM CHLORIDE 0.9 % (FLUSH) 0.9 %
5-40 SYRINGE (ML) INJECTION PRN
Status: CANCELLED | OUTPATIENT
Start: 2022-11-07

## 2022-11-03 RX ORDER — EPINEPHRINE 1 MG/ML
0.3 INJECTION, SOLUTION, CONCENTRATE INTRAVENOUS PRN
Status: CANCELLED | OUTPATIENT
Start: 2022-11-07

## 2022-11-03 RX ORDER — ONDANSETRON 2 MG/ML
8 INJECTION INTRAMUSCULAR; INTRAVENOUS
Status: CANCELLED | OUTPATIENT
Start: 2022-11-07

## 2022-11-03 ASSESSMENT — ENCOUNTER SYMPTOMS
RHINORRHEA: 0
SINUS PAIN: 0
SINUS PRESSURE: 0
CONSTIPATION: 0
NAUSEA: 0
SHORTNESS OF BREATH: 0
BACK PAIN: 0
EYE REDNESS: 0
DIARRHEA: 0
COUGH: 0
WHEEZING: 0
EYE DISCHARGE: 0
SORE THROAT: 0
ABDOMINAL PAIN: 0

## 2022-11-03 NOTE — PROGRESS NOTES
Pt on PO linezolid 600 mg BID x 14 days while we try and get IV MRSA coverage with oritavancin or dalbavancin   PO cipro 500 mg BID x 30 days until f/u  PO flagyl 500 mg TID x 30 days until f/u    Called Claudia at [facility]. She is discussing oritavancin vs dalbavancin therapy with pharmacy/insurance verification. We will see which medication is covered (if any) and go from there with the knowledge that oritavancin is preferred due to more studies for 6 weeks duration of therapy. Claudia to call me back with results. Then I will enter the therapy plan and ensure the patient is scheduled.      Melvin Eisenmenger, PharmD, [facility] [place], [place] Infectious Disease  Phone: [phone] (also available on [employer])  Fax: [phone]    For Pharmacy [address] [place] [address] Street in place:  Yes  Time Spent (min): 15

## 2022-11-03 NOTE — TELEPHONE ENCOUNTER
Pharmacy called stating there is a drug interaction between Linezolid and Escitalopram. Please advise. Thanks.

## 2022-11-03 NOTE — TELEPHONE ENCOUNTER
----- Message from Olivia Haider MD sent at 11/3/2022 12:31 PM EDT -----  The patient is an IV drug user with a left below-knee amputation stump osteomyelitis with MRSA. Will not be doing an outpatient PICC line. Please try to arrange for weekly Orbactiv for 6 weeks at an infusion center. Patient has done it before. Thanks.

## 2022-11-03 NOTE — PROGRESS NOTES
Infectious Diseases Clinic New Patient Note    Reason for Visit:                Left below-knee amputation stump osteomyelitis  Primary Care Physician:  RG Hinds CNP  History Obtained From:   Patient , Medical Records     CHIEF COMPLAINT:    Chief Complaint   Patient presents with    New Patient     Osteomyelitis -nk       HISTORY OF PRESENT ILLNESS: Charly Castillo is a 47 y.o. pleasant male patient, who had a outpatient visit with me today as a new patient. History was obtained from chart review and the patient. The patient had a in-person clinic visit with me today for above mentioned complaints. The patient has history of polysubstance abuse and history of right below-knee amputation, who has been dealing with left diabetic foot infection for past several weeks. The patient has been following up with the wound care center at [facility] and has been referred to me for concern for left below-knee amputation stump osteomyelitis with MRSA. Patient has history of right below-knee amputation in Nov 2021 and left below-knee amputation and Jan 2022. The patient was following up with an ID physician in [facility]. He had a wound culture done from left below-knee amputation stump wound in September of this year which was positive for MRSA. The patient was on weekly Orbactiv for 6 weeks. He did miss couple of doses of Orbactiv antibiotic while he was moving to [facility]. Patient's last dose of Olivia Dark was about 2 weeks ago. He establish care with the wound care center at [facility] and saw Dr. Adi Dow and was referred to me for osteomyelitis. The patient was scheduled for an urgent visit and was seen today as a new patient in ID clinic. Past Medical History:   Past medical  history wasreviewed by me during this visit in detail. No past medical history on file.     Past Surgical History:  Past surgical history was reviewed by me during this visit in detail. No past surgical history on file. Current Medications: All medicationswere reviewed by me during this visit  Current Outpatient Medications   Medication Sig Dispense Refill    linezolid (ZYVOX) 600 MG tablet Take 1 tablet by mouth 2 times daily for 15 days 30 tablet 0    ciprofloxacin (CIPRO) 500 MG tablet Take 1 tablet by mouth 2 times daily 60 tablet 0    metroNIDAZOLE (FLAGYL) 500 MG tablet Take 1 tablet by mouth 3 times daily 90 tablet 0    pregabalin (LYRICA) 150 MG capsule Take 150 mg by mouth 2 times daily. buprenorphine-naloxone (SUBOXONE) 8-2 MG SUBL SL tablet Place 1 tablet under the tongue 2 times daily. rOPINIRole (REQUIP XL) 2 MG extended release tablet Take 2 mg by mouth nightly      tamsulosin (FLOMAX) 0.4 MG capsule Take 0.4 mg by mouth daily      Continuous Blood Gluc Sensor (FREESTYLE MARCELLA 2 SENSOR) MISC 1 each by Does not apply route 4 times daily 1 each 12    insulin glargine (LANTUS SOLOSTAR) 100 UNIT/ML injection pen Inject 18 Units into the skin nightly 5 Adjustable Dose Pre-filled Pen Syringe 3    insulin lispro, 1 Unit Dial, (HUMALOG KWIKPEN) 100 UNIT/ML SOPN Inject 8 Units into the skin 3 times daily (before meals) 43.2 mL 0    Insulin Pen Needle 32G X 5 MM MISC 1 each by Does not apply route daily 100 each 3     No current facility-administered medications for this visit. Family history: All family history was reviewed by me today    No family history on file. No family history of immunocompromising or autoimmune conditions. No h/o TBin in family or contacts      REVIEW OF SYSTEMS:      Review of Systems   Constitutional:  Negative for chills, diaphoresis and fever. HENT:  Negative for ear discharge, ear pain, postnasal drip, rhinorrhea, sinus pressure, sinus pain and sore throat. Eyes:  Negative for discharge and redness. Respiratory:  Negative for cough, shortness of breath and wheezing.     Cardiovascular:  Negative for chest pain and leg swelling. Gastrointestinal:  Negative for abdominal pain, constipation, diarrhea and nausea. Endocrine: Negative for cold intolerance, heat intolerance and polydipsia. Genitourinary:  Negative for dysuria, flank pain, frequency, hematuria and urgency. Musculoskeletal:  Negative for back pain and myalgias. Skin:  Negative for rash. Longstanding left labial wound with osteomyelitis and purulent discharge   Allergic/Immunologic: Negative for immunocompromised state. Neurological:  Negative for dizziness, seizures and headaches. Hematological:  Does not bruise/bleed easily. Psychiatric/Behavioral:  Negative for agitation, hallucinations and suicidal ideas. The patient is not nervous/anxious. All other systems reviewed and are negative. PHYSICAL EXAM:      Vitals:    11/03/22 0920   BP: (!) 176/100   Pulse: 71   Temp: 97.6 °F (36.4 °C)   SpO2: 98%       Physical Exam  Vitals and nursing note reviewed. Constitutional:       Appearance: He is well-developed. He is not diaphoretic. Comments: The patient was seen earlier today. HENT:      Head: Normocephalic and atraumatic. Right Ear: External ear normal. There is no impacted cerumen. Left Ear: External ear normal. There is no impacted cerumen. Nose: Nose normal.      Mouth/Throat:      Mouth: Mucous membranes are moist.      Pharynx: Oropharynx is clear. No oropharyngeal exudate. Eyes:      General: No scleral icterus. Right eye: No discharge. Left eye: No discharge. Conjunctiva/sclera: Conjunctivae normal.      Pupils: Pupils are equal, round, and reactive to light. Comments: Right eye enucleated   Neck:      Thyroid: No thyromegaly. Cardiovascular:      Rate and Rhythm: Normal rate and regular rhythm. Heart sounds: Normal heart sounds. No murmur heard. No friction rub. Pulmonary:      Effort: No respiratory distress. Breath sounds: No stridor. No wheezing or rales. Abdominal:      General: Bowel sounds are normal.      Palpations: Abdomen is soft. Tenderness: There is no abdominal tenderness. There is no guarding or rebound. Musculoskeletal:         General: No swelling, tenderness or deformity. Normal range of motion. Cervical back: Normal range of motion and neck supple. Right lower leg: No edema. Left lower leg: No edema. Comments: Bilateral below-knee amputations noted   Lymphadenopathy:      Cervical: No cervical adenopathy. Skin:     General: Skin is warm and dry. Coloration: Skin is not jaundiced. Findings: No bruising, erythema or rash. Comments: Wound noted on the anterior side of left tibial stump with purulence, wound culture obtained at bedside   Neurological:      General: No focal deficit present. Mental Status: He is alert and oriented to person, place, and time. Mental status is at baseline. Motor: No abnormal muscle tone.    Psychiatric:         Mood and Affect: Mood normal.         Behavior: Behavior normal.             DATA:  All available lab data was reviewed by me during this visit    Last CBC:  Lab Results   Component Value Date    WBC 8.0 10/28/2022    HGB 10.9 (L) 10/28/2022    HCT 31.4 (L) 10/28/2022    MCV 86.9 10/28/2022     10/28/2022    LYMPHOPCT 18.4 10/28/2022    RBC 3.62 (L) 10/28/2022    MCH 30.0 10/28/2022    MCHC 34.5 10/28/2022    RDW 13.7 10/28/2022       Last BMP:  Lab Results   Component Value Date/Time     10/28/2022 02:29 PM    K 5.5 10/28/2022 02:29 PM     10/28/2022 02:29 PM    CO2 21 10/28/2022 02:29 PM    BUN 25 10/28/2022 02:29 PM    CREATININE 1.0 10/28/2022 02:29 PM    GLUCOSE 186 10/28/2022 02:29 PM    CALCIUM 8.7 10/28/2022 02:29 PM        Hepatic Function Panel:   Lab Results   Component Value Date/Time    ALKPHOS 94 10/28/2022 02:29 PM    ALT 8 10/28/2022 02:29 PM    AST 11 10/28/2022 02:29 PM    PROT 6.9 10/28/2022 02:29 PM    BILITOT <0.2 10/28/2022 02:29 PM    LABALBU 3.0 10/28/2022 02:29 PM       Last CK: No results found for: CKTOTAL    Last ESR:  No results found for: SEDRATE    Last CRP:  No results found for: CRP        Imaging: All pertinent images and reports for the current visit were reviewed by me during this visit. I reviewed the chest x-ray/CT scan/MRI images and independently interpreted the findings and results today. Outside records:    Labs, Microbiology, Radiology and pertinentresults from Care everywhere, if available, were reviewed as a part of the consultation.     Problem list:       Patient Active Problem List   Diagnosis Code    Chronic obstructive pulmonary disease ([email or web address] J44.9    Diabetic polyneuropathy associated with type 2 diabetes mellitus ([facility]) E11.42    Erectile dysfunction due to diabetes mellitus ([facility] E11.69, N52.1    ESRD (end stage renal disease) [facility] N18.6    Failure to thrive in adult R62.7    Type 2 diabetes mellitus ([facility] E11.9    Below-knee amputation of left lower extremity ([facility] S44.890U    Below-knee amputation of right lower extremity ([facility]) E31.247P    Anemia of infection and chronic disease B99.9, D63.8    Bipolar I disorder in remission ([facility] F31.70    Chronic hepatitis C without hepatic coma ([facility]) B18.2    Other acute osteomyelitis, multiple sites ([facility] M86.19    Nicotine dependence, cigarettes, uncomplicated N04.874    Polysubstance abuse ([facility]) F19.10    Chronic ulcer of right leg with fat layer exposed ([facility] L97.912       Microbiology:       All available micro data was reviewed by me during this visit      Left tibial stump wound culture  - collected on 6/24/22 : MRSA    Susceptibility    Organism Antibiotic Method Susceptibility   Staphylococcus aureus Clindamycin JEN >=4 mcg/mL: Resistant   Staphylococcus aureus Doxycycline JEN <=0.5 mcg/mL: Susceptible   Staphylococcus aureus Erythromycin JEN >=8 mcg/mL: Resistant   Staphylococcus aureus Oxacillin JEN >=4 mcg/mL: Resistant   Staphylococcus aureus Trimethoprim + Sulfamethoxazole JEN <=10 mcg/mL: Susceptible   Staphylococcus aureus Vancomycin JEN 1 mcg/mL: Susceptible   Specimen Collected: 06/24/22 13:55 Last Resulted: 06/27/22 07:34   Received From: [facility]  Result Received: 10/25/22 12:31     Stool C diff PCR: positive on 3/29/22    Allergies and immunizations:       Known drug Allergies: All allergies data was reviewed by me during this visit  Patient has no known allergies. Immunizations: All immunizations were reviewed byme in detail. Immunization History   Administered Date(s) Administered    COVID-19, PFIZER PURPLE top, DILUTE for use, (age 15 y+), 30mcg/0.3mL 04/28/2021, 05/26/2021    Influenza, FLUCELVAX, (age 10 mo+), MDCK, PF, 0.5mL 10/25/2022    Zoster Recombinant (Shingrix) 10/25/2022       Social History:  Allsocial and epidemiologic history was reviewed and updated be me in detail today.     Social History     Socioeconomic History    Marital status:    Tobacco Use    Smoking status: Every Day     Packs/day: 0.50     Years: 40.00     Pack years: 20.00     Types: Cigarettes    Smokeless tobacco: Never   Substance and Sexual Activity    Alcohol use: Not Currently    Drug use: Not Currently     Related to Urine Drug Screen  01/31/19 09/20/21 03/05/22 03/28/22 07/03/22 Component   None Detected Presumptive Positive Abnormal   Presumptive Positive Abnormal   None Detected  Presumptive Positive Abnormal   Amphetamine Screen, Urine   None Detected None Detected  None Detected  None Detected  None Detected  Barbiturate Screen, Urine   None Detected None Detected  None Detected  None Detected  None Detected  Benzodiazepine Screen, Urine   -- None Detected  None Detected  None Detected  None Detected  Cannabinoid Screen, Urine   -- Presumptive Positive Abnormal   None Detected  None Detected  Presumptive Positive Abnormal   Cocaine, Screen Urine   None Detected None Detected  None Detected  None Detected  None Detected  Methadone Screen, Urine   -- None Detected  None Detected  None Detected  None Detected  Opiate Screen, Urine   None Detected  None Detected  None Detected  None Detected  None Detected  Oxycodone Screen, Urine   -- None Detected  Presumptive Positive Abnormal   None Detected  None Detected  Buprenorphine, Ur   -- Presumptive Positive Abnormal   Presumptive Positive Abnormal   None Detected  Presumptive Positive Abnormal            COVID VACCINATION AND LAB RESULT RECORDS:     Internal Administration   First Dose COVID-19, PFIZER PURPLE top, DILUTE for use, (age 15 y+), 30mcg/0.3mL  04/28/2021   Second Dose COVID-19, PFIZER PURPLE top, DILUTE for use, (age 15 y+), 30mcg/0.3mL   05/26/2021       Last COVID Lab No results found for: SARS-COV-2, SARS-COV-2 RNA, SARS-COV-2, SARS-COV-2, SARS-COV-2 BY PCR, SARS-COV-2, SARS-COV-2, SARS-COV-2         IMPRESSION:    The patient is a 47 y. o.old male who  has no past medical history on file. was seen today for following problems:    1. Diabetic polyneuropathy associated with type 2 diabetes mellitus ([facility]    2. Chronic hepatitis C without hepatic coma (HCC)    3. Screening for HIV (human immunodeficiency virus)    4. Osteomyelitis of left tibia, unspecified type ([facility]    5. MRSA (methicillin resistant Staphylococcus aureus) infection    6. S/P BKA (below knee amputation) unilateral, left ([facility]    7. Right BKA infection ([facility]    8. H/O enucleation of right eyeball    9. Smoker    10. IVDU (intravenous drug user)    11. History of cocaine abuse ([facility]    12. Drug abuse counseling and surveillance of drug abuser    13. Encounter for medication counseling    14. Tobacco abuse counseling    15. Therapeutic drug monitoring    16. History of bipolar disorder        Discussion:      I reviewed patient's chart in detail and reviewed all of his prior treatment records, lab records and imaging studies. His last set of labs was done on 10/28/2022. His serum creatinine was 1.0.   His liver enzymes were okay with AST of 11 and ALT of 8 and  bilirubin of 0.2. His white cell count was 8000 with hemoglobin of 10.9 and platelet count of 023,259    The patient used to be on dialysis at one time for temporary acute renal failure but he is no longer on that. The patient tells me that he had a systemic infection several months ago that affected multiple organs and also affected his right eye for which he needed enucleation. The patient has a strong history of polysubstance abuse including cocaine abuse and has been a long-term smoker. He also has chronic hepatitis C. The patient tells me that he is in a drug rehab program at this time. I could find the results of MRI of his left tibia and fibula done at [facility] on June 25, 2022 via [facility]. At that time it showed osteomyelitis changes in the distal 4.4 cm of the residual left tibia    His left tibial stump wound culture on June 24, 2022 was positive for MRSA that was susceptible to doxycycline as well. RECOMMENDATIONS:      Diagnostic Workup: Will order CBC and CMP to be done every 2 weeks for therapeutic drug monitoring  Will order sed rate and CRP to be done every 2 weeks for therapeutic drug monitoring  I have ordered and obtained a left tibial stump wound culture at bedside in the clinic today  We will also order baseline HIV screen and hepatitis C quantitative PCR  Continue to follow fever curve at home. Antimicrobials: Will order p.o. linezolid 600 mg every 12 hour for 15 days for MRSA coverage. In the meanwhile, we will try to set patient up with our infusion center for weekly Orbactiv or dalbavancin.   Due to patient's strong history of IV drug use, I will not be able to order a PICC line for the patient unless patient is admitted in a nursing facility to assure good compliance with I/v antibiotics and to avoid misuse of PICC line and associated risks  The dose of orbactiv will be 1200 mg for first week followed by 800 mg Standing     Number of Occurrences:   6     Standing Expiration Date:   11/3/2023    Sedimentation Rate     Standing Status:   Standing     Number of Occurrences:   6     Standing Expiration Date:   11/3/2023    C-Reactive Protein     Standing Status:   Standing     Number of Occurrences:   6     Standing Expiration Date:   11/3/2023    HIV Screen     Standing Status:   Future     Standing Expiration Date:   11/3/2023    Hepatitis C RNA, quantitative, PCR     Standing Status:   Future     Standing Expiration Date:   11/3/2023       Medications ordered today in EPIC:    Orders Placed This Encounter   Medications    linezolid (ZYVOX) 600 MG tablet     Sig: Take 1 tablet by mouth 2 times daily for 15 days     Dispense:  30 tablet     Refill:  0    ciprofloxacin (CIPRO) 500 MG tablet     Sig: Take 1 tablet by mouth 2 times daily     Dispense:  60 tablet     Refill:  0    metroNIDAZOLE (FLAGYL) 500 MG tablet     Sig: Take 1 tablet by mouth 3 times daily     Dispense:  90 tablet     Refill:  0       Drug Monitoring:    Monitor for antibiotic toxicity as follows: CBC, CMP, ESR, CRP once a week to be collected every Monday or Tuesday. Fax above results to [phone]. Patient education and counseling: The patient was educated in detail about the side-effects of variousantibiotics and things to watch for like new rashes, lip swelling, severe reaction, worsening diarrhea, break through fever etc.  Patient was instructed to stop antibiotics and callour office if these problems were to occur, or go to nearest ER if experiencing severe symptoms. Discussed patient's condition and what to expect. All of the patient's questions wereaddressed in a satisfactory manner and patient verbalized understanding all instructions. Fluoroquinolone related instructions:     Patient instructed to watch for low or high blood sugars, muscle pains and ankle tendon pain while on Ciprofloxacin or Levofloxacin.  Patient was advised to keep a sugar candy at all times as all fluoroquinolones have the potential of causing hypoglycemia. If these symptoms develops, patient was instructed to stop the antibiotic and call my office at [phone]. Use sunscreen when going in bright sun while on Ciprofloxacin or Levofloxacin as these antibiotics can cause photosensitivity. Take 1 hour before or 2 hour after dairy, calcium, iron, magnesium, aluminum or zinc.      Illicit drug use and tobacco use disorder counseling:    I have counseled the patient extensively about risks of using illicit drugs and have encouraged the patient to maintain a healthy drug-free lifestyle. Information was given about various substance use prevention programs available in the area and their websites including www. addicted. org, www.madd. org, www.catsober. org, www.Paws for Life. [employer] and www. addictionservicescouncil.org.  I have counseled the patient extensively about risks of smoking and have encouraged the patient to maintain a healthy smoke-free lifestyle. Information was given about various smoking cessation programs and their websites like www.smokefree.gov, [place]. quitlogix. org as well as help lines like 1800-QUIT-NOW and 1-800WeatlasLUNG-USA. Follow-up:    Follow-up with me in ID clinic or through video visit in 1 months    Level of complexity of visit and medical decision making: High     Illness(es)/ Infection present that pose threat to bodily function. There is potential for severe exacerbation of infection/side effects of treatment. Therapy requires regular monitoring for antimicrobial agent toxicity. TIME SPENTTODAY:     - Spent over 62 minutes on visit (including interval history, physical exam, review of data including labs, cultures, imaging, development and implementation of treatmentplan and coordination of care). - Over 50% of face-to-face time spent with pt counseling and education.     Please note that this chart was generated using Dragon dictation software. Although every effort was made to ensure the accuracy of this automated transcription, some errors in transcription may have occurred inadvertently. If you may need any clarification, please do not hesitate to contact me through EPIC or at the phone number provided below with my electronic signature. Any pictures or media included in this note were obtained after taking informed verbal consent from the patient and with their approval to include those in the patient's medical record. Thankyou for involving me in the care of your patient. If you have any additional questions,please do not hesitate to contact me. Harlin Litten, MD, MPH, [address] [place] [address]  11/3/2022, 12:29 PM  [facility] Infectious Disease   [address] [place] [address], [address] [place] [address]  Office: [phone]  Fax: [phone]  In-person Clinic days:  Tuesday & Thursday a.m. Virtual clinic days: Monday, Wednesday & Friday a.m.

## 2022-11-03 NOTE — TELEPHONE ENCOUNTER
Tried calling patient and stated VM mailbox is full and unable to leave message.   Will try to call again

## 2022-11-03 NOTE — PROGRESS NOTES
Put in therapy plan for Oritavancin 1200 mg weekly x 6 weeks for MRSA osteo. This dose was verified with primary literature. Next step is for infusion center to verify benefits. Hopefully it is approved. If not, will have to determine another plan.      Josee Aquino, PharmD, [address] [place] [address] Infectious Disease  Phone: [phone] (also available on [email or web address])  Fax: [phone]    For Pharmacy [address] [place] [address] in place:  Yes  Time Spent (min): 15

## 2022-11-04 NOTE — TELEPHONE ENCOUNTER
Pharmacy called again this morning, requesting clarification. This office still unable to contact pt. Left a message on pt's alternate contact voicemail requesting he call the office.

## 2022-11-04 NOTE — DISCHARGE INSTRUCTIONS
[facility], [address] [place] [address] Road  Telephone: [phone]     Discharge Instructions     Important reminders:     **If you have any signs and symptoms of illness (Cough, fever, congestion, nausea, vomiting, diarrhea, etc.) please call the wound care center prior to your appointment. 1. Increase Protein intake for optimal wound healing  2. No added salt to reduce any swelling  3. If diabetic, maintain good glucose control  4. If you smoke, smoking prohibits wound healing, we ask that you refrain from smoking. 5. When taking antibiotics take the entire prescription as ordered. Do not stop taking until medication is all gone unless otherwise instructed. 6. Exercise as tolerated. 7. Keep weight off wounds and reposition every 2 hours if applicable. 8. If wound(s) is on your lower extremity, elevate legs to the level of the heart or above for 30 minutes 4-5 times a day and/or when sitting. Avoid standing for long periods of time. 9. Do not get wounds wet in bath or shower unless otherwise instructed by your physician. If your wound is on your foot or leg, you may purchase a cast bag. Please ask at the pharmacy. If Vascular testing is ordered, please call [address] [place] [address] [phone]) to schedule. Vascular tests ordered by Wound Care Physicians may take up to 2 hours to complete. Please keep that in mind when scheduling. If Vascular testing is scheduled, please bring supplies to replace your dressing after testing is done. The vascular department does not stock supplies. Wound: Right lower extremity, Left lower extremity      With each dressing change, rinse wounds with 0.9% Saline. (May use wound wash or soft contact solution. Both can be purchased at a local drug store). If unable to obtain saline, may use a gentle soap and water. Dressing care: Right leg- Antibiotic ointment, bandaid- change daily.  Left leg- pack with Collagen, 4 x 4's & tape- change daily. Dr Mario Noland  Phone: [phone]  Fax: [phone]     Abilities in [address]   [facility]   [phone]        Home Care Agency/Facility:      Your wound-care supplies will be provided by: [address] f: [phone] f: [phone] p: [phone] [email or web address]    Please note, depending on your insurance coverage, you may have out-of-pocket expenses for these supplies. Someone from the company should call you to confirm your order and discuss those potential costs before they ship your products -- please anticipate that call. If your out-of-pocket cost could be substantial, Many companies have financial hardship programs for patients who qualify, so please ask about that if you might need a hand. If you have any questions about your supplies or your potential out-of-pocket costs, or if you need to place an order for a refill of supplies (typically monthly), please call the company directly. Your  is Magi Norris     Follow up with Dr Angel Lockett In 1 week(s) in the wound care center. Wound Care Center Information: Should you experience any significant changes in your wound(s) or have questions about your wound care, please contact the [facility] at [phone] Monday  - Thursday 8:00 am - 4:00 pm and Friday 8:00 am - 1:00pm. If you need help with your wound outside these hours and cannot wait until we are again available, contact your PCP or go to the hospital emergency room. PLEASE NOTE: IF YOU ARE UNABLE TO OBTAIN WOUND SUPPLIES, CONTINUE TO USE THE SUPPLIES YOU HAVE AVAILABLE UNTIL YOU ARE ABLE TO REACH US. IT IS MOST IMPORTANT TO KEEP THE WOUND COVERED AT ALL TIMES. Patient Experience     Thank you for trusting us with your care. You may receive a survey from a company called [employer] asking for your feedback.   We would appreciate it if you took a few minutes to share your experience.   Your input is very valuable to us

## 2022-11-04 NOTE — TELEPHONE ENCOUNTER
Spoke with pharmacist at [facility], pt is currently on Lexapro 20mg daily. She doesn't recommend he stop cold turkey. Please advise. Thanks.

## 2022-11-04 NOTE — TELEPHONE ENCOUNTER
Okay to continue both Lexapro 20 mg and linezolid. Please advise the pharmacist to go ahead and fill the prescription.

## 2022-11-06 LAB
GRAM STAIN RESULT: ABNORMAL
ORGANISM: ABNORMAL
WOUND/ABSCESS: ABNORMAL

## 2022-11-07 ENCOUNTER — HOSPITAL ENCOUNTER (OUTPATIENT)
Dept: WOUND CARE | Age: 52
Discharge: HOME | DRG: 243 | End: 2022-11-07
Payer: MEDICAID

## 2022-11-07 VITALS — HEART RATE: 82 BPM | SYSTOLIC BLOOD PRESSURE: 124 MMHG | WEIGHT: 160 LBS | DIASTOLIC BLOOD PRESSURE: 68 MMHG

## 2022-11-07 DIAGNOSIS — L97.912 CHRONIC ULCER OF RIGHT LEG WITH FAT LAYER EXPOSED (HCC): Primary | ICD-10-CM

## 2022-11-07 PROCEDURE — 99213 OFFICE O/P EST LOW 20 MIN: CPT

## 2022-11-07 RX ORDER — LIDOCAINE HYDROCHLORIDE 40 MG/ML
SOLUTION TOPICAL ONCE
OUTPATIENT
Start: 2022-11-07 | End: 2022-11-07

## 2022-11-07 RX ORDER — CLOBETASOL PROPIONATE 0.5 MG/G
OINTMENT TOPICAL ONCE
OUTPATIENT
Start: 2022-11-07 | End: 2022-11-07

## 2022-11-07 RX ORDER — BETAMETHASONE DIPROPIONATE 0.05 %
OINTMENT (GRAM) TOPICAL ONCE
OUTPATIENT
Start: 2022-11-07 | End: 2022-11-07

## 2022-11-07 RX ORDER — GENTAMICIN SULFATE 1 MG/G
OINTMENT TOPICAL ONCE
OUTPATIENT
Start: 2022-11-07 | End: 2022-11-07

## 2022-11-07 RX ORDER — LIDOCAINE HYDROCHLORIDE 20 MG/ML
JELLY TOPICAL ONCE
OUTPATIENT
Start: 2022-11-07 | End: 2022-11-07

## 2022-11-07 RX ORDER — LIDOCAINE 40 MG/G
CREAM TOPICAL ONCE
OUTPATIENT
Start: 2022-11-07 | End: 2022-11-07

## 2022-11-07 RX ORDER — BACITRACIN, NEOMYCIN, POLYMYXIN B 400; 3.5; 5 [USP'U]/G; MG/G; [USP'U]/G
OINTMENT TOPICAL ONCE
OUTPATIENT
Start: 2022-11-07 | End: 2022-11-07

## 2022-11-07 RX ORDER — LIDOCAINE 40 MG/G
CREAM TOPICAL ONCE
Status: DISCONTINUED | OUTPATIENT
Start: 2022-11-07 | End: 2022-11-08 | Stop reason: HOSPADM

## 2022-11-07 RX ORDER — LIDOCAINE 50 MG/G
OINTMENT TOPICAL ONCE
OUTPATIENT
Start: 2022-11-07 | End: 2022-11-07

## 2022-11-07 RX ORDER — BACITRACIN ZINC AND POLYMYXIN B SULFATE 500; 1000 [USP'U]/G; [USP'U]/G
OINTMENT TOPICAL ONCE
OUTPATIENT
Start: 2022-11-07 | End: 2022-11-07

## 2022-11-07 RX ORDER — GINSENG 100 MG
CAPSULE ORAL ONCE
OUTPATIENT
Start: 2022-11-07 | End: 2022-11-07

## 2022-11-07 NOTE — PROGRESS NOTES
[facility]   Progress Note and Procedure Note      Hari Hayden  AGE: 47 y.o. GENDER: male  : 1970  TODAY'S DATE:  2022    Subjective:     Chief Complaint   Patient presents with    Wound Check     Bilateral amputation site         HISTORY of PRESENT ILLNESS HPI     Hari Hayden is a 47 y.o. male who presents today for wound evaluation. History of Wound: B/l bka stump ulcers. Wound Pain:  intermittent  Severity:  2 / 10   Wound Type:  diabetic  Modifying Factors:  edema, diabetes, poor glucose control, and decreased mobility  Associated Signs/Symptoms:  edema and drainage     PAST MEDICAL HISTORY    History reviewed. No pertinent past medical history. PAST SURGICAL HISTORY    History reviewed. No pertinent surgical history. FAMILY HISTORY    History reviewed. No pertinent family history. SOCIAL HISTORY    Social History     Tobacco Use    Smoking status: Every Day     Packs/day: 0.50     Years: 40.00     Pack years: 20.00     Types: Cigarettes    Smokeless tobacco: Never   Substance Use Topics    Alcohol use: Not Currently    Drug use: Not Currently       ALLERGIES    No Known Allergies    MEDICATIONS    Current Outpatient Medications on File Prior to Encounter   Medication Sig Dispense Refill    linezolid (ZYVOX) 600 MG tablet Take 1 tablet by mouth 2 times daily for 15 days 30 tablet 0    ciprofloxacin (CIPRO) 500 MG tablet Take 1 tablet by mouth 2 times daily 60 tablet 0    metroNIDAZOLE (FLAGYL) 500 MG tablet Take 1 tablet by mouth 3 times daily 90 tablet 0    pregabalin (LYRICA) 150 MG capsule Take 150 mg by mouth 2 times daily. buprenorphine-naloxone (SUBOXONE) 8-2 MG SUBL SL tablet Place 1 tablet under the tongue 2 times daily.       rOPINIRole (REQUIP XL) 2 MG extended release tablet Take 2 mg by mouth nightly      tamsulosin (FLOMAX) 0.4 MG capsule Take 0.4 mg by mouth daily      Continuous Blood Gluc Sensor (FREESTYLE MARCELLA 2 SENSOR) MISC 1 each by Does not apply route 4 times daily 1 each 12    insulin glargine (LANTUS SOLOSTAR) 100 UNIT/ML injection pen Inject 18 Units into the skin nightly 5 Adjustable Dose Pre-filled Pen Syringe 3    insulin lispro, 1 Unit Dial, (HUMALOG KWIKPEN) 100 UNIT/ML SOPN Inject 8 Units into the skin 3 times daily (before meals) 43.2 mL 0    Insulin Pen Needle 32G X 5 MM MISC 1 each by Does not apply route daily 100 each 3     No current facility-administered medications on file prior to encounter. REVIEW OF SYSTEMS    Pertinent items are noted in HPI. Objective:      /68   Pulse 82   Wt 160 lb (72.6 kg)     PHYSICAL EXAM    General Appearance: alert and oriented to person, place and time, well-developed and well-nourished, in no acute distress  Skin: warm and dry, no rash or erythema and ulcer noted to the L BKA stump.  Abrasions note R BKA stump  Extremities: no cyanosis, no clubbing, and edema of b/l stumps  Musculoskeletal: b/l BKA  Neurologic: speech normal and sensation diminished      Assessment:     Patient Active Problem List   Diagnosis    Chronic obstructive pulmonary disease (HCC)    Diabetic polyneuropathy associated with type 2 diabetes mellitus ([address]    Erectile dysfunction due to diabetes mellitus ([address]    ESRD (end stage renal disease) (HCC)    Failure to thrive in adult    Type 2 diabetes mellitus ([address]    Below-knee amputation of left lower extremity (HCC)    Below-knee amputation of right lower extremity (HCC)    Anemia of infection and chronic disease    Bipolar I disorder in remission ([address]    Chronic hepatitis C without hepatic coma ([address]    Other acute osteomyelitis, multiple sites ([address]    Nicotine dependence, cigarettes, uncomplicated    Polysubstance abuse ([address]    Chronic ulcer of right leg with fat layer exposed ([address]    MRSA (methicillin resistant Staphylococcus aureus) infection         Post  Debridement Measurements:  Wound 10/31/22 Leg Right;Distal #1 (Active)   Wound Image   10/31/22 1417   Wound Etiology Non-Healing Surgical 11/07/22 1316   Wound Cleansed Wound cleanser 11/07/22 1316   Dressing/Treatment Antibacterial ointment;Dry dressing 10/31/22 1417   Wound Length (cm) 0 cm 11/07/22 1316   Wound Width (cm) 0 cm 11/07/22 1316   Wound Depth (cm) 0 cm 11/07/22 1316   Wound Surface Area (cm^2) 0 cm^2 11/07/22 1316   Change in Wound Size % (l*w) 100 11/07/22 1316   Wound Volume (cm^3) 0 cm^3 11/07/22 1316   Wound Healing % 100 11/07/22 1316   Wound Assessment Dry 11/07/22 1316   Drainage Amount None 11/07/22 1316   Drainage Description Serosanguinous 10/31/22 1417   Odor None 11/07/22 1316   Verónica-wound Assessment Intact 11/07/22 1316   Margins Attached edges 11/07/22 1316   Number of days: 7       Wound 10/31/22 Leg Left;Distal #2 (Active)   Wound Image   10/31/22 1417   Wound Etiology Non-Healing Surgical 11/07/22 1316   Wound Cleansed Cleansed with saline 11/07/22 1316   Dressing/Treatment Collagen;Dry dressing 10/31/22 1417   Offloading for Diabetic Foot Ulcers Offloading not required 10/31/22 1417   Wound Length (cm) 0.2 cm 11/07/22 1316   Wound Width (cm) 0.2 cm 11/07/22 1316   Wound Depth (cm) 2 cm 11/07/22 1316   Wound Surface Area (cm^2) 0.04 cm^2 11/07/22 1316   Change in Wound Size % (l*w) 0 11/07/22 1316   Wound Volume (cm^3) 0.08 cm^3 11/07/22 1316   Wound Healing % -33 11/07/22 1316   Post-Procedure Length (cm) 0.2 cm 10/31/22 1430   Post-Procedure Width (cm) 0.2 cm 10/31/22 1430   Post-Procedure Depth (cm) 1.5 cm 10/31/22 1430   Post-Procedure Surface Area (cm^2) 0.04 cm^2 10/31/22 1430   Post-Procedure Volume (cm^3) 0.06 cm^3 10/31/22 1430   Wound Assessment Pink/red 11/07/22 1316   Drainage Amount Scant 11/07/22 1316   Drainage Description Serosanguinous 11/07/22 1316   Odor None 11/07/22 1316   Verónica-wound Assessment Intact 11/07/22 1316   Margins Defined edges 11/07/22 1316   Wound Thickness Description not for Pressure Injury Full thickness 11/07/22 1316   Number of days: 7 Plan:     The nature of the patient's condition was explained in depth. The patient was informed that their compliance to the treatment plan is paramount to successful healing and prevention of further ulceration and/or infection     Discharge Treatment      Reviewed previous noted and xrays  Wound debridement held R dressed with abx ointment L dress with collagen   Referral to ID to evaluate potential need for long term abx. Referral to general surgery for potential evaluation of excision of wound with primary closure. Possible referral to vascular pending evaluation by general surgery for potential amp site revision.      Written Patient Discharge Instructions Given            Electronically signed by Laura Del Rio DPM on 11/7/2022 at 1:31 PM

## 2022-11-07 NOTE — PLAN OF CARE
Discharge instructions given. Patient verbalized understanding.   Return to [facility] in 1 week(s) with Dr Kathaleen Osgood

## 2022-11-09 ENCOUNTER — APPOINTMENT (OUTPATIENT)
Dept: GENERAL RADIOLOGY | Age: 52
DRG: 243 | End: 2022-11-09
Payer: MEDICAID

## 2022-11-09 ENCOUNTER — APPOINTMENT (OUTPATIENT)
Dept: CT IMAGING | Age: 52
DRG: 243 | End: 2022-11-09
Payer: MEDICAID

## 2022-11-09 ENCOUNTER — HOSPITAL ENCOUNTER (INPATIENT)
Age: 52
LOS: 4 days | Discharge: LEFT BEFORE DISCHARGE | DRG: 243 | End: 2022-11-13
Attending: EMERGENCY MEDICINE | Admitting: INTERNAL MEDICINE
Payer: MEDICAID

## 2022-11-09 DIAGNOSIS — R11.2 NAUSEA VOMITING AND DIARRHEA: ICD-10-CM

## 2022-11-09 DIAGNOSIS — Z87.39 HISTORY OF OSTEOMYELITIS: ICD-10-CM

## 2022-11-09 DIAGNOSIS — R77.8 ELEVATED TROPONIN: ICD-10-CM

## 2022-11-09 DIAGNOSIS — R19.7 NAUSEA VOMITING AND DIARRHEA: ICD-10-CM

## 2022-11-09 DIAGNOSIS — K20.90 ESOPHAGITIS: Primary | ICD-10-CM

## 2022-11-09 DIAGNOSIS — R93.5 ABNORMAL CT OF THE ABDOMEN: ICD-10-CM

## 2022-11-09 DIAGNOSIS — S88.119A BELOW KNEE AMPUTATION (HCC): ICD-10-CM

## 2022-11-09 PROBLEM — R10.13 EPIGASTRIC PAIN: Status: ACTIVE | Noted: 2022-11-09

## 2022-11-09 LAB
A/G RATIO: 0.7 (ref 1.1–2.2)
ALBUMIN SERPL-MCNC: 3.5 G/DL (ref 3.4–5)
ALP BLD-CCNC: 134 U/L (ref 40–129)
ALT SERPL-CCNC: 10 U/L (ref 10–40)
AMMONIA: 33 UMOL/L (ref 16–60)
AMPHETAMINE SCREEN, URINE: NORMAL
ANION GAP SERPL CALCULATED.3IONS-SCNC: 9 MMOL/L (ref 3–16)
AST SERPL-CCNC: 23 U/L (ref 15–37)
BACTERIA: ABNORMAL /HPF
BARBITURATE SCREEN URINE: NORMAL
BASE EXCESS VENOUS: 0.2 MMOL/L (ref -3–3)
BASOPHILS ABSOLUTE: 0.1 K/UL (ref 0–0.2)
BASOPHILS RELATIVE PERCENT: 0.5 %
BENZODIAZEPINE SCREEN, URINE: NORMAL
BETA-HYDROXYBUTYRATE: 0.3 MMOL/L (ref 0–0.27)
BILIRUB SERPL-MCNC: 0.3 MG/DL (ref 0–1)
BILIRUBIN URINE: NEGATIVE
BLOOD, URINE: ABNORMAL
BUN BLDV-MCNC: 29 MG/DL (ref 7–20)
CALCIUM SERPL-MCNC: 9.5 MG/DL (ref 8.3–10.6)
CANNABINOID SCREEN URINE: NORMAL
CARBOXYHEMOGLOBIN: 3.4 % (ref 0–1.5)
CHLORIDE BLD-SCNC: 105 MMOL/L (ref 99–110)
CLARITY: CLEAR
CO2: 26 MMOL/L (ref 21–32)
COCAINE METABOLITE SCREEN URINE: NORMAL
COLOR: YELLOW
CREAT SERPL-MCNC: 1.1 MG/DL (ref 0.9–1.3)
EKG ATRIAL RATE: 86 BPM
EKG DIAGNOSIS: NORMAL
EKG P AXIS: 73 DEGREES
EKG P-R INTERVAL: 232 MS
EKG Q-T INTERVAL: 366 MS
EKG QRS DURATION: 108 MS
EKG QTC CALCULATION (BAZETT): 437 MS
EKG R AXIS: -65 DEGREES
EKG T AXIS: 54 DEGREES
EKG VENTRICULAR RATE: 86 BPM
EOSINOPHILS ABSOLUTE: 0 K/UL (ref 0–0.6)
EOSINOPHILS RELATIVE PERCENT: 0 %
EPITHELIAL CELLS, UA: 1 /HPF (ref 0–5)
ETHANOL: NORMAL MG/DL (ref 0–0.08)
FENTANYL SCREEN, URINE: NORMAL
GFR SERPL CREATININE-BSD FRML MDRD: >60 ML/MIN/{1.73_M2}
GLUCOSE BLD-MCNC: 169 MG/DL (ref 70–99)
GLUCOSE BLD-MCNC: 251 MG/DL (ref 70–99)
GLUCOSE URINE: 250 MG/DL
HCO3 VENOUS: 25.6 MMOL/L (ref 23–29)
HCT VFR BLD CALC: 39.1 % (ref 40.5–52.5)
HEMOGLOBIN: 13.2 G/DL (ref 13.5–17.5)
HYALINE CASTS: 6 /LPF (ref 0–8)
KETONES, URINE: NEGATIVE MG/DL
LACTIC ACID: 1.7 MMOL/L (ref 0.4–2)
LEUKOCYTE ESTERASE, URINE: NEGATIVE
LIPASE: 16 U/L (ref 13–60)
LYMPHOCYTES ABSOLUTE: 0.7 K/UL (ref 1–5.1)
LYMPHOCYTES RELATIVE PERCENT: 4.8 %
Lab: NORMAL
MAGNESIUM: 1.9 MG/DL (ref 1.8–2.4)
MCH RBC QN AUTO: 30 PG (ref 26–34)
MCHC RBC AUTO-ENTMCNC: 33.8 G/DL (ref 31–36)
MCV RBC AUTO: 88.6 FL (ref 80–100)
METHADONE SCREEN, URINE: NORMAL
METHEMOGLOBIN VENOUS: 0 %
MICROSCOPIC EXAMINATION: YES
MONOCYTES ABSOLUTE: 0.2 K/UL (ref 0–1.3)
MONOCYTES RELATIVE PERCENT: 1.3 %
NEUTROPHILS ABSOLUTE: 14.1 K/UL (ref 1.7–7.7)
NEUTROPHILS RELATIVE PERCENT: 93.4 %
NITRITE, URINE: NEGATIVE
O2 CONTENT, VEN: 19 VOL %
O2 SAT, VEN: 100 %
O2 THERAPY: ABNORMAL
OPIATE SCREEN URINE: NORMAL
OXYCODONE URINE: NORMAL
PCO2, VEN: 43 MMHG (ref 40–50)
PDW BLD-RTO: 13.8 % (ref 12.4–15.4)
PERFORMED ON: ABNORMAL
PH UA: 5.5
PH UA: 5.5 (ref 5–8)
PH VENOUS: 7.38 (ref 7.35–7.45)
PHENCYCLIDINE SCREEN URINE: NORMAL
PLATELET # BLD: 278 K/UL (ref 135–450)
PMV BLD AUTO: 8.4 FL (ref 5–10.5)
PO2, VEN: 149 MMHG (ref 25–40)
POTASSIUM SERPL-SCNC: 4.4 MMOL/L (ref 3.5–5.1)
PRO-BNP: 1398 PG/ML (ref 0–124)
PROCALCITONIN: 0.1 NG/ML (ref 0–0.15)
PROTEIN UA: >=1000 MG/DL
RBC # BLD: 4.41 M/UL (ref 4.2–5.9)
RBC UA: 27 /HPF (ref 0–4)
SODIUM BLD-SCNC: 140 MMOL/L (ref 136–145)
SPECIFIC GRAVITY UA: 1.01 (ref 1–1.03)
TCO2 CALC VENOUS: 60 MMOL/L
TOTAL PROTEIN: 8.3 G/DL (ref 6.4–8.2)
TROPONIN: 0.08 NG/ML
URINE REFLEX TO CULTURE: ABNORMAL
URINE TYPE: ABNORMAL
UROBILINOGEN, URINE: 0.2 E.U./DL
WBC # BLD: 15.1 K/UL (ref 4–11)
WBC UA: 2 /HPF (ref 0–5)

## 2022-11-09 PROCEDURE — 2500000003 HC RX 250 WO HCPCS: Performed by: PHYSICIAN ASSISTANT

## 2022-11-09 PROCEDURE — 83605 ASSAY OF LACTIC ACID: CPT

## 2022-11-09 PROCEDURE — 80307 DRUG TEST PRSMV CHEM ANLYZR: CPT

## 2022-11-09 PROCEDURE — 96375 TX/PRO/DX INJ NEW DRUG ADDON: CPT

## 2022-11-09 PROCEDURE — 71045 X-RAY EXAM CHEST 1 VIEW: CPT

## 2022-11-09 PROCEDURE — A4216 STERILE WATER/SALINE, 10 ML: HCPCS | Performed by: PHYSICIAN ASSISTANT

## 2022-11-09 PROCEDURE — 74176 CT ABD & PELVIS W/O CONTRAST: CPT

## 2022-11-09 PROCEDURE — 36415 COLL VENOUS BLD VENIPUNCTURE: CPT

## 2022-11-09 PROCEDURE — 99285 EMERGENCY DEPT VISIT HI MDM: CPT

## 2022-11-09 PROCEDURE — 80053 COMPREHEN METABOLIC PANEL: CPT

## 2022-11-09 PROCEDURE — 85025 COMPLETE CBC W/AUTO DIFF WBC: CPT

## 2022-11-09 PROCEDURE — 82140 ASSAY OF AMMONIA: CPT

## 2022-11-09 PROCEDURE — 1200000000 HC SEMI PRIVATE

## 2022-11-09 PROCEDURE — 83735 ASSAY OF MAGNESIUM: CPT

## 2022-11-09 PROCEDURE — 82803 BLOOD GASES ANY COMBINATION: CPT

## 2022-11-09 PROCEDURE — 6360000002 HC RX W HCPCS: Performed by: PHYSICIAN ASSISTANT

## 2022-11-09 PROCEDURE — 82010 KETONE BODYS QUAN: CPT

## 2022-11-09 PROCEDURE — 87040 BLOOD CULTURE FOR BACTERIA: CPT

## 2022-11-09 PROCEDURE — C9113 INJ PANTOPRAZOLE SODIUM, VIA: HCPCS | Performed by: INTERNAL MEDICINE

## 2022-11-09 PROCEDURE — 2060000000 HC ICU INTERMEDIATE R&B

## 2022-11-09 PROCEDURE — 6370000000 HC RX 637 (ALT 250 FOR IP): Performed by: INTERNAL MEDICINE

## 2022-11-09 PROCEDURE — 83690 ASSAY OF LIPASE: CPT

## 2022-11-09 PROCEDURE — 2580000003 HC RX 258: Performed by: INTERNAL MEDICINE

## 2022-11-09 PROCEDURE — 82077 ASSAY SPEC XCP UR&BREATH IA: CPT

## 2022-11-09 PROCEDURE — 2580000003 HC RX 258: Performed by: PHYSICIAN ASSISTANT

## 2022-11-09 PROCEDURE — 93005 ELECTROCARDIOGRAM TRACING: CPT | Performed by: PHYSICIAN ASSISTANT

## 2022-11-09 PROCEDURE — 81001 URINALYSIS AUTO W/SCOPE: CPT

## 2022-11-09 PROCEDURE — 83880 ASSAY OF NATRIURETIC PEPTIDE: CPT

## 2022-11-09 PROCEDURE — 93010 ELECTROCARDIOGRAM REPORT: CPT | Performed by: INTERNAL MEDICINE

## 2022-11-09 PROCEDURE — 96374 THER/PROPH/DIAG INJ IV PUSH: CPT

## 2022-11-09 PROCEDURE — 84484 ASSAY OF TROPONIN QUANT: CPT

## 2022-11-09 PROCEDURE — 6360000002 HC RX W HCPCS: Performed by: INTERNAL MEDICINE

## 2022-11-09 PROCEDURE — 84145 PROCALCITONIN (PCT): CPT

## 2022-11-09 RX ORDER — SODIUM CHLORIDE 9 MG/ML
INJECTION, SOLUTION INTRAVENOUS PRN
Status: DISCONTINUED | OUTPATIENT
Start: 2022-11-09 | End: 2022-11-13 | Stop reason: HOSPADM

## 2022-11-09 RX ORDER — ROPINIROLE 0.25 MG/1
0.25 TABLET, FILM COATED ORAL NIGHTLY
Status: DISCONTINUED | OUTPATIENT
Start: 2022-11-09 | End: 2022-11-13 | Stop reason: HOSPADM

## 2022-11-09 RX ORDER — POLYETHYLENE GLYCOL 3350 17 G/17G
17 POWDER, FOR SOLUTION ORAL DAILY PRN
Status: DISCONTINUED | OUTPATIENT
Start: 2022-11-09 | End: 2022-11-13 | Stop reason: HOSPADM

## 2022-11-09 RX ORDER — CIPROFLOXACIN 500 MG/1
500 TABLET, FILM COATED ORAL 2 TIMES DAILY
Status: DISCONTINUED | OUTPATIENT
Start: 2022-11-09 | End: 2022-11-10

## 2022-11-09 RX ORDER — PANTOPRAZOLE SODIUM 40 MG/10ML
40 INJECTION, POWDER, LYOPHILIZED, FOR SOLUTION INTRAVENOUS 2 TIMES DAILY
Status: DISCONTINUED | OUTPATIENT
Start: 2022-11-09 | End: 2022-11-11

## 2022-11-09 RX ORDER — ACETAMINOPHEN 650 MG/1
650 SUPPOSITORY RECTAL EVERY 6 HOURS PRN
Status: DISCONTINUED | OUTPATIENT
Start: 2022-11-09 | End: 2022-11-13 | Stop reason: HOSPADM

## 2022-11-09 RX ORDER — TAMSULOSIN HYDROCHLORIDE 0.4 MG/1
0.4 CAPSULE ORAL DAILY
Status: DISCONTINUED | OUTPATIENT
Start: 2022-11-10 | End: 2022-11-13 | Stop reason: HOSPADM

## 2022-11-09 RX ORDER — NIFEDIPINE 30 MG/1
30 TABLET, EXTENDED RELEASE ORAL DAILY
Status: DISCONTINUED | OUTPATIENT
Start: 2022-11-09 | End: 2022-11-10

## 2022-11-09 RX ORDER — ONDANSETRON 4 MG/1
4 TABLET, ORALLY DISINTEGRATING ORAL EVERY 8 HOURS PRN
Status: DISCONTINUED | OUTPATIENT
Start: 2022-11-09 | End: 2022-11-13 | Stop reason: HOSPADM

## 2022-11-09 RX ORDER — INSULIN LISPRO 100 [IU]/ML
0-8 INJECTION, SOLUTION INTRAVENOUS; SUBCUTANEOUS
Status: DISCONTINUED | OUTPATIENT
Start: 2022-11-09 | End: 2022-11-13 | Stop reason: HOSPADM

## 2022-11-09 RX ORDER — PREGABALIN 75 MG/1
150 CAPSULE ORAL 2 TIMES DAILY
Status: DISCONTINUED | OUTPATIENT
Start: 2022-11-09 | End: 2022-11-13 | Stop reason: HOSPADM

## 2022-11-09 RX ORDER — SODIUM CHLORIDE, SODIUM LACTATE, POTASSIUM CHLORIDE, CALCIUM CHLORIDE 600; 310; 30; 20 MG/100ML; MG/100ML; MG/100ML; MG/100ML
1000 INJECTION, SOLUTION INTRAVENOUS CONTINUOUS
Status: DISCONTINUED | OUTPATIENT
Start: 2022-11-09 | End: 2022-11-09

## 2022-11-09 RX ORDER — LISINOPRIL 10 MG/1
10 TABLET ORAL DAILY
Status: DISCONTINUED | OUTPATIENT
Start: 2022-11-09 | End: 2022-11-10

## 2022-11-09 RX ORDER — SODIUM CHLORIDE, SODIUM LACTATE, POTASSIUM CHLORIDE, CALCIUM CHLORIDE 600; 310; 30; 20 MG/100ML; MG/100ML; MG/100ML; MG/100ML
1000 INJECTION, SOLUTION INTRAVENOUS ONCE
Status: COMPLETED | OUTPATIENT
Start: 2022-11-09 | End: 2022-11-09

## 2022-11-09 RX ORDER — ONDANSETRON 2 MG/ML
4 INJECTION INTRAMUSCULAR; INTRAVENOUS ONCE
Status: COMPLETED | OUTPATIENT
Start: 2022-11-09 | End: 2022-11-09

## 2022-11-09 RX ORDER — BUPRENORPHINE AND NALOXONE 8; 2 MG/1; MG/1
1 FILM, SOLUBLE BUCCAL; SUBLINGUAL 2 TIMES DAILY
Status: DISCONTINUED | OUTPATIENT
Start: 2022-11-10 | End: 2022-11-13 | Stop reason: HOSPADM

## 2022-11-09 RX ORDER — SODIUM CHLORIDE 0.9 % (FLUSH) 0.9 %
5-40 SYRINGE (ML) INJECTION PRN
Status: DISCONTINUED | OUTPATIENT
Start: 2022-11-09 | End: 2022-11-13 | Stop reason: HOSPADM

## 2022-11-09 RX ORDER — METRONIDAZOLE 250 MG/1
500 TABLET ORAL 3 TIMES DAILY
Status: DISCONTINUED | OUTPATIENT
Start: 2022-11-09 | End: 2022-11-10

## 2022-11-09 RX ORDER — SODIUM CHLORIDE 0.9 % (FLUSH) 0.9 %
5-40 SYRINGE (ML) INJECTION EVERY 12 HOURS SCHEDULED
Status: DISCONTINUED | OUTPATIENT
Start: 2022-11-09 | End: 2022-11-13 | Stop reason: HOSPADM

## 2022-11-09 RX ORDER — INSULIN LISPRO 100 [IU]/ML
8 INJECTION, SOLUTION INTRAVENOUS; SUBCUTANEOUS
Status: DISCONTINUED | OUTPATIENT
Start: 2022-11-09 | End: 2022-11-11

## 2022-11-09 RX ORDER — INSULIN GLARGINE 100 [IU]/ML
18 INJECTION, SOLUTION SUBCUTANEOUS NIGHTLY
Status: DISCONTINUED | OUTPATIENT
Start: 2022-11-09 | End: 2022-11-13 | Stop reason: HOSPADM

## 2022-11-09 RX ORDER — ACETAMINOPHEN 325 MG/1
650 TABLET ORAL EVERY 6 HOURS PRN
Status: DISCONTINUED | OUTPATIENT
Start: 2022-11-09 | End: 2022-11-13 | Stop reason: HOSPADM

## 2022-11-09 RX ORDER — LINEZOLID 600 MG/1
600 TABLET, FILM COATED ORAL 2 TIMES DAILY
Status: DISCONTINUED | OUTPATIENT
Start: 2022-11-09 | End: 2022-11-10

## 2022-11-09 RX ORDER — INSULIN LISPRO 100 [IU]/ML
0-4 INJECTION, SOLUTION INTRAVENOUS; SUBCUTANEOUS NIGHTLY
Status: DISCONTINUED | OUTPATIENT
Start: 2022-11-09 | End: 2022-11-13 | Stop reason: HOSPADM

## 2022-11-09 RX ORDER — ENOXAPARIN SODIUM 100 MG/ML
40 INJECTION SUBCUTANEOUS NIGHTLY
Status: DISCONTINUED | OUTPATIENT
Start: 2022-11-09 | End: 2022-11-13 | Stop reason: HOSPADM

## 2022-11-09 RX ORDER — DEXTROSE MONOHYDRATE 100 MG/ML
INJECTION, SOLUTION INTRAVENOUS CONTINUOUS PRN
Status: DISCONTINUED | OUTPATIENT
Start: 2022-11-09 | End: 2022-11-13 | Stop reason: HOSPADM

## 2022-11-09 RX ORDER — ASPIRIN 325 MG/1
325 TABLET, FILM COATED ORAL ONCE
Status: DISCONTINUED | OUTPATIENT
Start: 2022-11-09 | End: 2022-11-09

## 2022-11-09 RX ORDER — ONDANSETRON 2 MG/ML
4 INJECTION INTRAMUSCULAR; INTRAVENOUS EVERY 6 HOURS PRN
Status: DISCONTINUED | OUTPATIENT
Start: 2022-11-09 | End: 2022-11-13 | Stop reason: HOSPADM

## 2022-11-09 RX ADMIN — NIFEDIPINE 30 MG: 30 TABLET, EXTENDED RELEASE ORAL at 22:37

## 2022-11-09 RX ADMIN — PREGABALIN 150 MG: 75 CAPSULE ORAL at 22:41

## 2022-11-09 RX ADMIN — ROPINIROLE HYDROCHLORIDE 0.25 MG: 0.25 TABLET, FILM COATED ORAL at 22:42

## 2022-11-09 RX ADMIN — CIPROFLOXACIN 500 MG: 500 TABLET, FILM COATED ORAL at 22:37

## 2022-11-09 RX ADMIN — FAMOTIDINE 20 MG: 10 INJECTION, SOLUTION INTRAVENOUS at 13:23

## 2022-11-09 RX ADMIN — PANTOPRAZOLE SODIUM 40 MG: 40 INJECTION, POWDER, FOR SOLUTION INTRAVENOUS at 22:37

## 2022-11-09 RX ADMIN — LISINOPRIL 10 MG: 10 TABLET ORAL at 22:37

## 2022-11-09 RX ADMIN — LINEZOLID 600 MG: 600 TABLET, FILM COATED ORAL at 22:41

## 2022-11-09 RX ADMIN — SODIUM CHLORIDE, POTASSIUM CHLORIDE, SODIUM LACTATE AND CALCIUM CHLORIDE 1000 ML: 600; 310; 30; 20 INJECTION, SOLUTION INTRAVENOUS at 13:25

## 2022-11-09 RX ADMIN — ONDANSETRON 4 MG: 2 INJECTION INTRAMUSCULAR; INTRAVENOUS at 22:58

## 2022-11-09 RX ADMIN — Medication 10 ML: at 22:39

## 2022-11-09 RX ADMIN — ONDANSETRON 4 MG: 2 INJECTION INTRAMUSCULAR; INTRAVENOUS at 13:25

## 2022-11-09 RX ADMIN — ENOXAPARIN SODIUM 40 MG: 100 INJECTION SUBCUTANEOUS at 22:36

## 2022-11-09 RX ADMIN — METRONIDAZOLE 500 MG: 250 TABLET ORAL at 22:41

## 2022-11-09 ASSESSMENT — ENCOUNTER SYMPTOMS
SHORTNESS OF BREATH: 0
ABDOMINAL PAIN: 1
CONSTIPATION: 0
NAUSEA: 1
VOMITING: 1
DIARRHEA: 1
STRIDOR: 0
WHEEZING: 0
COUGH: 1
COLOR CHANGE: 0

## 2022-11-09 ASSESSMENT — PAIN - FUNCTIONAL ASSESSMENT: PAIN_FUNCTIONAL_ASSESSMENT: NONE - DENIES PAIN

## 2022-11-09 NOTE — ED NOTES
Pt brought in by employee at inpatient rehab facility. Pt has multiple complaints. Extensive medical history. Reviewing ER note from pt's visit at [facility] stated he is not allowed to return to the inpatient facility he had previously been staying d/t inability to manage his medical problems.      Michelle Wilkinson RN  11/09/22 9257

## 2022-11-09 NOTE — H&P
HOSPITALISTS HISTORY AND PHYSICAL    11/9/2022 6:31 PM    Patient Information:  Aurelia Adler is a 47 y.o. male [id number]  PCP:  RG Flores CNP (Tel: [phone] )    Chief complaint:    Chief Complaint   Patient presents with    Illness     N/v/d for several days, feeling \"like shit\". Recently kicked out of substance abuse treatment for having \"too many medical problems\". ~6 weeks off meth and heroin. History of Present Illness:  Vikram Elder is a 47 y.o. male with history of polysubstance abuse on Suboxone (heroin and meth, last use 6 weeks ago), current smoker, COPD, IDDM, s/p bilateral BKA with chronic stump ulcerations and left amputation stump osteomyelitis with MRSA previously on weekly Orbactiv for 6 weeks and currently on oral Zyvox, Cipro and Flagyl, right eye blindness, presented with complaints of malaise, NBNB vomiting with nausea and copious watery diarrhea for several days and burning epigastric pain. Epigastric pain constant, severe, nonradiating, no aggravating or relieving factors. He also endorsed myalgias, cough, congestion and fatigue. Patient was residing at the [address] [place] [address] rehab facility but was released today because of his medical comorbidities and needs. He is currently waiting to be accepted at [facility]. He denied any fevers, melena, hematochezia, or hematemesis. CT in the ED and showed esophageal thickening concerning for esophagitis versus malignancy. History obtained from patient. REVIEW OF SYSTEMS:   Constitutional: Negative for fever,chills or night sweats  ENT: Negative for rhinorrhea, epistaxis, hoarseness, sore throat.   Respiratory: Negative for shortness of breath,wheezing  Cardiovascular: Negative for chest pain, palpitations   Gastrointestinal: As above  Genitourinary: Negative for polyuria, dysuria Hematologic/Lymphatic: Negative for bleeding tendency, easy bruising  Musculoskeletal: as above. Neurologic: Negative for confusion,dysarthria. Skin: Negative for itching,rash  Psychiatric: Negative for depression,anxiety, agitation. Endocrine: Negative for polydipsia,polyuria,heat /cold intolerance. Past Medical History:   has a past medical history of Anemia of infection and chronic disease, Bipolar 1 disorder ([facility] Diabetes mellitus ([facility] Diabetic neuropathy ([facility] Nicotine dependence, Osteomyelitis, multiple sites ([facility] and Polysubstance abuse ([facility] Past Surgical History:   has a past surgical history that includes Leg amputation below knee (Bilateral). Medications:  No current facility-administered medications on file prior to encounter. Current Outpatient Medications on File Prior to Encounter   Medication Sig Dispense Refill    linezolid (ZYVOX) 600 MG tablet Take 1 tablet by mouth 2 times daily for 15 days 30 tablet 0    ciprofloxacin (CIPRO) 500 MG tablet Take 1 tablet by mouth 2 times daily 60 tablet 0    metroNIDAZOLE (FLAGYL) 500 MG tablet Take 1 tablet by mouth 3 times daily 90 tablet 0    pregabalin (LYRICA) 150 MG capsule Take 150 mg by mouth 2 times daily. buprenorphine-naloxone (SUBOXONE) 8-2 MG SUBL SL tablet Place 1 tablet under the tongue 2 times daily.       rOPINIRole (REQUIP XL) 2 MG extended release tablet Take 2 mg by mouth nightly      tamsulosin (FLOMAX) 0.4 MG capsule Take 0.4 mg by mouth daily      Continuous Blood Gluc Sensor (FREESTYLE MARCELLA 2 SENSOR) MISC 1 each by Does not apply route 4 times daily 1 each 12    insulin glargine (LANTUS SOLOSTAR) 100 UNIT/ML injection pen Inject 18 Units into the skin nightly 5 Adjustable Dose Pre-filled Pen Syringe 3    insulin lispro, 1 Unit Dial, (HUMALOG KWIKPEN) 100 UNIT/ML SOPN Inject 8 Units into the skin 3 times daily (before meals) 43.2 mL 0    Insulin Pen Needle 32G X 5 MM MISC 1 each by Does not apply route daily 100 each 3       Allergies:  No Known Allergies     Social History:  Patient Lives at [address] [place], [place] [address]ab facility. reports that he has been smoking cigarettes. He has a 20.00 pack-year smoking history. He has never used smokeless tobacco. He reports that he does not currently use alcohol. He reports that he does not currently use drugs. Family History:  family history is not on file. No known family history. Physical Exam:  BP (!) 177/94   Pulse 93   Temp 98.3 °F (36.8 °C) (Oral)   Resp 21   Wt 164 lb 1.6 oz (74.4 kg)   SpO2 98%     General appearance: Chronically ill looking, appears comfortable. Well nourished  Eyes: Sclera clear, pupils equal  ENT: Moist mucus membranes, no thrush. Trachea midline. Cardiovascular: Regular rhythm, normal S1, S2. No murmur, gallop, rub. No edema in lower extremities  Respiratory: Clear to auscultation bilaterally, no wheeze, good inspiratory effort  Gastrointestinal: Abdomen soft, non-tender, not distended, normal bowel sounds  Musculoskeletal: No cyanosis in digits, neck supple. Bilateral BKA's with superficial stump ulcerations. Neurology: Cranial nerves grossly intact. Alert and oriented in time, place and person. No speech or motor deficits  Psychiatry: Appropriate affect.  Not agitated  Skin: Warm, dry, normal turgor, no rash  Brisk capillary refill, peripheral pulses palpable   Labs:  CBC:   Lab Results   Component Value Date/Time    WBC 15.1 11/09/2022 12:10 PM    RBC 4.41 11/09/2022 12:10 PM    HGB 13.2 11/09/2022 12:10 PM    HCT 39.1 11/09/2022 12:10 PM    MCV 88.6 11/09/2022 12:10 PM    MCH 30.0 11/09/2022 12:10 PM    MCHC 33.8 11/09/2022 12:10 PM    RDW 13.8 11/09/2022 12:10 PM     11/09/2022 12:10 PM    MPV 8.4 11/09/2022 12:10 PM     BMP:    Lab Results   Component Value Date/Time     11/09/2022 12:10 PM    K 4.4 11/09/2022 12:10 PM     11/09/2022 12:10 PM    CO2 26 11/09/2022 12:10 PM    BUN 29 11/09/2022 12:10 PM CREATININE 1.1 11/09/2022 12:10 PM    CALCIUM 9.5 11/09/2022 12:10 PM    LABGLOM >60 11/09/2022 12:10 PM    GLUCOSE 251 11/09/2022 12:10 PM     XR CHEST PORTABLE   Final Result   No significant findings in the chest.         CT ABDOMEN PELVIS WO CONTRAST Additional Contrast? None   Final Result   Circumferential wall thickening of the distal esophagus is partially   visualized and consistent with a esophagitis or esophageal neoplasm. Direct   visualization may be indicated. Cholelithiasis, no CT evidence of cholecystitis      Osteoarthritis of the right hip           Chest Xray: As above   EKG: Sinus rhythm with 1st degree AV block. LAFB. I visualized CXR images and EKG strips. Problem List  Principal Problem:    Epigastric pain  Resolved Problems:    * No resolved hospital problems. *        Assessment/Plan:     Epigastric pain:  CT concerning for esophagitis versus underlying malignancy. Continue Protonix. GI consulted. Left BKA stump osteomyelitis:  Followed by Dr. Raheem Miller.  Continue oral Zyvox, Cipro and Flagyl. Diarrhea: Follow-up C. difficile and GI PCR. HTN: Started on amlodipine and lisinopril. T2 DM on Long term Insulin with hyperglycemia:  Follow-up A1c. Continue Basal/prandial insulin with SSI. Polysubstance abuse: Waiting for acceptance at [facility]. Continue Suboxone. Current smoker: Smoking cessation counseling provided. DVT prophylaxis: Lovenox  Code status: full code  Diet: regular  IV access: peripheral  Pizano Catheter: None    Admit as inpatient. I anticipate hospitalization spanning more than two midnights for investigation and treatment of the above medically necessary diagnoses. Please note that some part of this chart was generated using Dragon dictation software. Although every effort was made to ensure the accuracy of this automated transcription, some errors in transcription may have occurred inadvertently.  If you may need any clarification, please do not hesitate to contact me through [facility].        Nydia Simpson MD    11/9/2022 6:31 PM

## 2022-11-09 NOTE — ED PROVIDER NOTES
In addition to the advanced practice provider, I personally saw Trace Lyons and performed a substantive portion of the visit including all aspects of the medical decision making. Medical Decision Making  Patient presented to the emergency department with nausea, vomiting, diarrhea. He is also had cough and fatigue. He has been clean from meth and heroin for the last 6 weeks, but was released from [address] [place] [address] due to his complex medical comorbidities and needs. He is supposed to be transferred to [facility], but has not yet been accepted there. Without further care, the patient's chronic medical conditions will worsen, so he was sent to the emergency department. Specifically, the patient has bilateral BKA's and is currently taking antibiotics for osteomyelitis. We treated and controlled the patient's nausea vomiting with medications at bedside. He will be admitted to the hospital for continuation of his antibiotic therapy and further management of his medical conditions until he can be placed in his new facility. EKG  The Ekg interpreted by me in the absence of a cardiologist shows. normal sinus rhythm with a rate of 86  Axis is   Left axis deviation  QTc is  normal  LAFB     No specific ST-T wave changes appreciated. No evidence of acute ischemia. No previous EKG is available for comparison. FINAL IMPRESSION  1. Esophagitis    2. Nausea vomiting and diarrhea    3. Elevated troponin    4. Below knee amputation ([address]    5. History of osteomyelitis    6. Abnormal CT of the abdomen        Blood pressure (!) 158/84, pulse 79, temperature 98.3 °F (36.8 °C), temperature source Oral, resp. rate 18, weight 165 lb (74.8 kg), SpO2 98 %.      For further details of [facility] emergency department encounter, please see documentation by advanced practice provider, LISETH Ron.        Madi Khan MD  11/14/22 1756

## 2022-11-09 NOTE — ED NOTES
Patient had several episodes of emesis over last 30 minutes. Patient refusing all medicines at this time, demanding something to drink.      Valentino Flight, RN  11/09/22 5278

## 2022-11-09 NOTE — ED PROVIDER NOTES
[facility]        Pt Name: Yary Kwok  MRN: [id number]  [place] 1970  Date of evaluation: 11/9/2022  Provider: Audrey Swartz PA-C  PCP: RG Eduardo CNP  Note Started: 11:29 AM EST 11/9/2022         I have seen and evaluated this patient with my supervising physician Tip To MD.    [address] [place] [address]       Chief Complaint   Patient presents with    Illness     N/v/d for several days, feeling \"like shit\". Recently kicked out of substance abuse treatment for having \"too many medical problems\". ~6 weeks off meth and heroin. HISTORY OF PRESENT ILLNESS   (Location, Timing/Onset, Context/Setting, Quality, Duration, Modifying Factors, Severity, Associated Signs and Symptoms)  Note limiting factors. Chief Complaint: Nausea, vomiting, diarrhea, cough, body aches    Yary Kwok is a 47 y.o. male who presents to the emergency department stating that he developed nausea, vomiting and diarrhea today. He has felt badly with body aches, cough, congestion and fatigue for several days. 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 He was staying at [facility] rehab facility however was released today because of his medical comorbidities and needs. He is currently awaiting to be excepted by [facility]. He is currently taking oral ciprofloxacin and linezolid for known osteomyelitis in bilateral lower extremities. He has below the knee amputation bilaterally with chronic wounds on the legs. Does report burning sensation in the abdomen. Denies alcohol abuse    Nursing Notes were all reviewed and agreed with or any disagreements were addressed in the HPI. REVIEW OF SYSTEMS    (2-9 systems for level 4, 10 or more for level 5)     Review of Systems   Constitutional:  Positive for fatigue. Negative for chills and fever. HENT:  Positive for congestion.     Eyes:  Negative for visual disturbance. Respiratory:  Positive for cough. Negative for shortness of breath, wheezing and stridor. Cardiovascular:  Negative for chest pain, palpitations and leg swelling. Gastrointestinal:  Positive for abdominal pain, diarrhea, nausea and vomiting. Negative for constipation. Endocrine: Negative. Genitourinary: Negative. Musculoskeletal:  Positive for myalgias. Negative for neck stiffness. Skin:  Positive for wound (chronic wounds on stumps of bilateral lower extremities). Negative for color change, pallor and rash. Neurological:  Positive for weakness. Negative for dizziness, tremors, seizures, syncope, facial asymmetry, speech difficulty, light-headedness, numbness and headaches. Psychiatric/Behavioral:  Negative for confusion. All other systems reviewed and are negative. Positives and Pertinent negatives as per HPI. Except as noted above in the ROS, all other systems were reviewed and negative. PAST MEDICAL HISTORY   No past medical history on file. SURGICAL HISTORY   No past surgical history on file. CURRENTMEDICATIONS       Previous Medications    BUPRENORPHINE-NALOXONE (SUBOXONE) 8-2 MG SUBL SL TABLET    Place 1 tablet under the tongue 2 times daily.     CIPROFLOXACIN (CIPRO) 500 MG TABLET    Take 1 tablet by mouth 2 times daily    CONTINUOUS BLOOD GLUC SENSOR (FREESTYLE MARCELLA 2 SENSOR) MISC    1 each by Does not apply route 4 times daily    INSULIN GLARGINE (LANTUS SOLOSTAR) 100 UNIT/ML INJECTION PEN    Inject 18 Units into the skin nightly    INSULIN LISPRO, 1 UNIT DIAL, (HUMALOG KWIKPEN) 100 UNIT/ML SOPN    Inject 8 Units into the skin 3 times daily (before meals)    INSULIN PEN NEEDLE 32G X 5 MM MISC    1 each by Does not apply route daily    LINEZOLID (ZYVOX) 600 MG TABLET    Take 1 tablet by mouth 2 times daily for 15 days    METRONIDAZOLE (FLAGYL) 500 MG TABLET    Take 1 tablet by mouth 3 times daily    PREGABALIN (LYRICA) 150 MG CAPSULE    Take 150 mg by mouth 2 times daily. ROPINIROLE (REQUIP XL) 2 MG EXTENDED RELEASE TABLET    Take 2 mg by mouth nightly    TAMSULOSIN (FLOMAX) 0.4 MG CAPSULE    Take 0.4 mg by mouth daily         ALLERGIES     Patient has no known allergies. FAMILYHISTORY     No family history on file. SOCIAL HISTORY       Social History     Tobacco Use    Smoking status: Every Day     Packs/day: 0.50     Years: 40.00     Pack years: 20.00     Types: Cigarettes    Smokeless tobacco: Never   Substance Use Topics    Alcohol use: Not Currently    Drug use: Not Currently       SCREENINGS    [place] Coma Scale  Eye Opening: Spontaneous  Best Verbal Response: Oriented  Best Motor Response: Obeys commands  [place] Coma Scale Score: 15        PHYSICAL EXAM    (up to 7 for level 4, 8 or more for level 5)     ED Triage Vitals [11/09/22 1116]   BP Temp Temp Source Heart Rate Resp SpO2 Height Weight   (!) 205/89 98 °F (36.7 °C) Oral 88 14 94 % -- --       Physical Exam  Vitals and nursing note reviewed. Constitutional:       Appearance: Normal appearance. He is well-developed. He is not toxic-appearing or diaphoretic. HENT:      Head: Normocephalic and atraumatic. Right Ear: External ear normal.      Left Ear: External ear normal.      Nose: Nose normal.      Mouth/Throat:      Mouth: Mucous membranes are moist.      Pharynx: Oropharynx is clear. Eyes:      General: No scleral icterus. Right eye: No discharge. Left eye: No discharge. Extraocular Movements: Extraocular movements intact. Conjunctiva/sclera: Conjunctivae normal.      Pupils: Pupils are equal, round, and reactive to light. Neck:      Trachea: Trachea and phonation normal.      Meningeal: Brudzinski's sign and Kernig's sign absent. Cardiovascular:      Rate and Rhythm: Normal rate. Pulmonary:      Effort: Pulmonary effort is normal.      Breath sounds: Normal breath sounds.    Abdominal:      General: Bowel sounds are normal. There is no distension. Palpations: Abdomen is soft. Tenderness: There is no abdominal tenderness. There is no right CVA tenderness or left CVA tenderness. Musculoskeletal:         General: Normal range of motion. Cervical back: Full passive range of motion without pain, normal range of motion and neck supple. Comments: Chronic superficial scabbed ulcers on bilateral BKA stumps. Lymphadenopathy:      Cervical: No cervical adenopathy. Skin:     General: Skin is warm and dry. Capillary Refill: Capillary refill takes less than 2 seconds. Coloration: Skin is not jaundiced or pale. Findings: No bruising, erythema, lesion or rash. Neurological:      General: No focal deficit present. Mental Status: He is alert and oriented to person, place, and time.    Psychiatric:         Behavior: Behavior normal.       DIAGNOSTIC RESULTS   LABS:    Labs Reviewed   CBC WITH AUTO DIFFERENTIAL - Abnormal; Notable for the following components:       Result Value    WBC 15.1 (*)     Hemoglobin 13.2 (*)     Hematocrit 39.1 (*)     Neutrophils Absolute 14.1 (*)     Lymphocytes Absolute 0.7 (*)     All other components within normal limits   COMPREHENSIVE METABOLIC PANEL - Abnormal; Notable for the following components:    Glucose 251 (*)     BUN 29 (*)     Total Protein 8.3 (*)     Albumin/Globulin Ratio 0.7 (*)     Alkaline Phosphatase 134 (*)     All other components within normal limits   TROPONIN - Abnormal; Notable for the following components:    Troponin 0.08 (*)     All other components within normal limits   BRAIN NATRIURETIC PEPTIDE - Abnormal; Notable for the following components:    Pro-BNP 1,398 (*)     All other components within normal limits   BETA-HYDROXYBUTYRATE - Abnormal; Notable for the following components:    Beta-Hydroxybutyrate 0.30 (*)     All other components within normal limits   BLOOD GAS, VENOUS - Abnormal; Notable for the following components:    pO2, [place] 149.0 (*) Carboxyhemoglobin 3.4 (*)     All other components within normal limits   CULTURE, BLOOD 2   CULTURE, BLOOD 1   LIPASE   MAGNESIUM   LACTIC ACID   PROCALCITONIN   ETHANOL   AMMONIA   URINALYSIS WITH REFLEX TO CULTURE   LACTIC ACID   LACTIC ACID   LACTIC ACID   LACTIC ACID   LACTIC ACID   LACTIC ACID   URINE DRUG SCREEN   LACTIC ACID       When ordered only abnormal lab results are displayed. All other labs were within normal range or not returned as of this dictation. EKG: When ordered, EKG's are interpreted by the Emergency Department Physician in the absence of a cardiologist.  Please see their note for interpretation of EKG. RADIOLOGY:   Non-plain film images such as CT, Ultrasound and MRI are read by the radiologist. Plain radiographic images are visualized and preliminarily interpreted by the ED Provider with the below findings:        Interpretation per the Radiologist below, if available at the time of this note:    XR CHEST PORTABLE   Final Result   No significant findings in the chest.         CT ABDOMEN PELVIS WO CONTRAST Additional Contrast? None   Final Result   Circumferential wall thickening of the distal esophagus is partially   visualized and consistent with a esophagitis or esophageal neoplasm. Direct   visualization may be indicated. Cholelithiasis, no CT evidence of cholecystitis      Osteoarthritis of the right hip                 PROCEDURES   Unless otherwise noted below, none     Procedures    CRITICAL CARE TIME   There was a high probability of life-threatening clinical deterioration in the patient's condition requiring my urgent intervention. I personally saw the patient and independently provided 33 minutes of non-concurrent critical care out of the total shared critical care time provided, excluding separately reportable procedures.          CONSULTS:  IP CONSULT TO SOCIAL WORK  IP CONSULT TO HOSPITALIST      EMERGENCY DEPARTMENT COURSE and DIFFERENTIAL DIAGNOSIS/MDM:   Vitals: Vitals:    11/09/22 1116   BP: (!) 205/89   Pulse: 88   Resp: 14   Temp: 98 °F (36.7 °C)   TempSrc: Oral   SpO2: 94%       Patient was given the following medications:  Medications   aspirin (ASCRIPTIN) buffered tablet 325 mg (has no administration in time range)   lactated ringers infusion 1,000 mL (1,000 mLs IntraVENous New Bag 11/9/22 1325)   ondansetron (ZOFRAN) injection 4 mg (4 mg IntraVENous Given 11/9/22 1325)   famotidine (PEPCID) 20 mg in sodium chloride (PF) 0.9 % 10 mL injection (20 mg IntraVENous Given 11/9/22 1323)         Is this patient to be included in the SEP-1 Core Measure due to severe sepsis or septic shock? No   Exclusion criteria - the patient is NOT to be included for SEP-1 Core Measure due to:  May have criteria for sepsis, but does not meet criteria for severe sepsis or septic shock    This patient presents to the emergency department complaining of nausea, vomiting, diarrhea, cough, burning abdominal discomfort. Abdomen is soft in all 4 quadrants without pulsatile mass or CVA tenderness. EKG morphology appears stable. Denies chest pain. However troponin is mildly elevated. Therefore, buffered aspirin ordered. He has chronic superficial ulcers on bilateral BKA stumps. He is currently being treated for osteomyelitis with ciprofloxacin and Zyvox. He does have a leukocytosis but is afebrile without lactic acidosis. CT scan shows evidence of esophagitis. This certainly could be explaining why patient has nausea, vomiting and burning abdominal pain. Patient's symptoms are improved with medicine given here. We do feel admission is warranted for further management.  will likely be needed to assist patient with placement. FINAL IMPRESSION      1. Esophagitis    2. Nausea vomiting and diarrhea    3. Elevated troponin    4. Below knee amputation ([address]    5.  History of osteomyelitis          DISPOSITION/PLAN   DISPOSITION Decision To Admit 11/09/2022 01:45:45 PM      PATIENT REFERRED TO:  No follow-up provider specified.     DISCHARGE MEDICATIONS:  New Prescriptions    No medications on file       DISCONTINUED MEDICATIONS:  Discontinued Medications    No medications on file              (Please note that portions of this note were completed with a voice recognition program.  Efforts were made to edit the dictations but occasionally words are mis-transcribed.)    Deejay Warner PA-C (electronically signed)           Deejay Warner PA-C  11/09/22 1450

## 2022-11-09 NOTE — ED NOTES
Okay to give pt ice chips per Dr. Alfredo Fritz. Patient provided with ice chips at this time. Patient agreeable for medications at this time. Bed alarm placed on bed and activated, patient educated on fall risk, both side rails up with call light within reach.      Glory Song RN  11/09/22 1873

## 2022-11-10 ENCOUNTER — ANESTHESIA EVENT (OUTPATIENT)
Dept: ENDOSCOPY | Age: 52
DRG: 243 | End: 2022-11-10
Payer: MEDICAID

## 2022-11-10 ENCOUNTER — ANESTHESIA (OUTPATIENT)
Dept: ENDOSCOPY | Age: 52
DRG: 243 | End: 2022-11-10
Payer: MEDICAID

## 2022-11-10 LAB
A/G RATIO: 0.6 (ref 1.1–2.2)
ALBUMIN SERPL-MCNC: 2.9 G/DL (ref 3.4–5)
ALP BLD-CCNC: 87 U/L (ref 40–129)
ALT SERPL-CCNC: 9 U/L (ref 10–40)
ANION GAP SERPL CALCULATED.3IONS-SCNC: 11 MMOL/L (ref 3–16)
AST SERPL-CCNC: 20 U/L (ref 15–37)
BASOPHILS ABSOLUTE: 0.1 K/UL (ref 0–0.2)
BASOPHILS RELATIVE PERCENT: 0.8 %
BILIRUB SERPL-MCNC: 0.3 MG/DL (ref 0–1)
BUN BLDV-MCNC: 29 MG/DL (ref 7–20)
CALCIUM SERPL-MCNC: 8.9 MG/DL (ref 8.3–10.6)
CHLORIDE BLD-SCNC: 104 MMOL/L (ref 99–110)
CHOLESTEROL, TOTAL: 118 MG/DL (ref 0–199)
CO2: 22 MMOL/L (ref 21–32)
CREAT SERPL-MCNC: 1.3 MG/DL (ref 0.9–1.3)
EOSINOPHILS ABSOLUTE: 0 K/UL (ref 0–0.6)
EOSINOPHILS RELATIVE PERCENT: 0.1 %
ESTIMATED AVERAGE GLUCOSE: 116.9 MG/DL
GFR SERPL CREATININE-BSD FRML MDRD: >60 ML/MIN/{1.73_M2}
GLUCOSE BLD-MCNC: 131 MG/DL (ref 70–99)
GLUCOSE BLD-MCNC: 164 MG/DL (ref 70–99)
GLUCOSE BLD-MCNC: 164 MG/DL (ref 70–99)
GLUCOSE BLD-MCNC: 167 MG/DL (ref 70–99)
GLUCOSE BLD-MCNC: 184 MG/DL (ref 70–99)
GLUCOSE BLD-MCNC: 92 MG/DL (ref 70–99)
HBA1C MFR BLD: 5.7 %
HCT VFR BLD CALC: 36.2 % (ref 40.5–52.5)
HDLC SERPL-MCNC: 51 MG/DL (ref 40–60)
HEMOGLOBIN: 12.4 G/DL (ref 13.5–17.5)
LACTIC ACID: 1.1 MMOL/L (ref 0.4–2)
LACTIC ACID: 1.2 MMOL/L (ref 0.4–2)
LDL CHOLESTEROL CALCULATED: 42 MG/DL
LYMPHOCYTES ABSOLUTE: 1.3 K/UL (ref 1–5.1)
LYMPHOCYTES RELATIVE PERCENT: 9.2 %
MAGNESIUM: 1.6 MG/DL (ref 1.8–2.4)
MCH RBC QN AUTO: 29.8 PG (ref 26–34)
MCHC RBC AUTO-ENTMCNC: 34.4 G/DL (ref 31–36)
MCV RBC AUTO: 86.8 FL (ref 80–100)
MONOCYTES ABSOLUTE: 0.6 K/UL (ref 0–1.3)
MONOCYTES RELATIVE PERCENT: 4.2 %
NEUTROPHILS ABSOLUTE: 12.2 K/UL (ref 1.7–7.7)
NEUTROPHILS RELATIVE PERCENT: 85.7 %
PDW BLD-RTO: 13.5 % (ref 12.4–15.4)
PERFORMED ON: ABNORMAL
PERFORMED ON: NORMAL
PHOSPHORUS: 3.6 MG/DL (ref 2.5–4.9)
PLATELET # BLD: 243 K/UL (ref 135–450)
PMV BLD AUTO: 8 FL (ref 5–10.5)
POTASSIUM SERPL-SCNC: 4.2 MMOL/L (ref 3.5–5.1)
RBC # BLD: 4.17 M/UL (ref 4.2–5.9)
SODIUM BLD-SCNC: 137 MMOL/L (ref 136–145)
TOTAL PROTEIN: 7.4 G/DL (ref 6.4–8.2)
TRIGL SERPL-MCNC: 123 MG/DL (ref 0–150)
VLDLC SERPL CALC-MCNC: 25 MG/DL
WBC # BLD: 14.2 K/UL (ref 4–11)

## 2022-11-10 PROCEDURE — 36415 COLL VENOUS BLD VENIPUNCTURE: CPT

## 2022-11-10 PROCEDURE — 83735 ASSAY OF MAGNESIUM: CPT

## 2022-11-10 PROCEDURE — 80061 LIPID PANEL: CPT

## 2022-11-10 PROCEDURE — 3700000001 HC ADD 15 MINUTES (ANESTHESIA): Performed by: INTERNAL MEDICINE

## 2022-11-10 PROCEDURE — 6360000002 HC RX W HCPCS: Performed by: NURSE ANESTHETIST, CERTIFIED REGISTERED

## 2022-11-10 PROCEDURE — C9113 INJ PANTOPRAZOLE SODIUM, VIA: HCPCS | Performed by: INTERNAL MEDICINE

## 2022-11-10 PROCEDURE — 88313 SPECIAL STAINS GROUP 2: CPT

## 2022-11-10 PROCEDURE — 3700000000 HC ANESTHESIA ATTENDED CARE: Performed by: INTERNAL MEDICINE

## 2022-11-10 PROCEDURE — 2500000003 HC RX 250 WO HCPCS: Performed by: INTERNAL MEDICINE

## 2022-11-10 PROCEDURE — 7100000000 HC PACU RECOVERY - FIRST 15 MIN: Performed by: INTERNAL MEDICINE

## 2022-11-10 PROCEDURE — 83605 ASSAY OF LACTIC ACID: CPT

## 2022-11-10 PROCEDURE — 84100 ASSAY OF PHOSPHORUS: CPT

## 2022-11-10 PROCEDURE — 6360000002 HC RX W HCPCS: Performed by: INTERNAL MEDICINE

## 2022-11-10 PROCEDURE — 2580000003 HC RX 258: Performed by: ANESTHESIOLOGY

## 2022-11-10 PROCEDURE — 2580000003 HC RX 258: Performed by: INTERNAL MEDICINE

## 2022-11-10 PROCEDURE — 3609012400 HC EGD TRANSORAL BIOPSY SINGLE/MULTIPLE: Performed by: INTERNAL MEDICINE

## 2022-11-10 PROCEDURE — 7100000001 HC PACU RECOVERY - ADDTL 15 MIN: Performed by: INTERNAL MEDICINE

## 2022-11-10 PROCEDURE — 80053 COMPREHEN METABOLIC PANEL: CPT

## 2022-11-10 PROCEDURE — 83036 HEMOGLOBIN GLYCOSYLATED A1C: CPT

## 2022-11-10 PROCEDURE — 0DB68ZX EXCISION OF STOMACH, VIA NATURAL OR ARTIFICIAL OPENING ENDOSCOPIC, DIAGNOSTIC: ICD-10-PCS | Performed by: INTERNAL MEDICINE

## 2022-11-10 PROCEDURE — 88305 TISSUE EXAM BY PATHOLOGIST: CPT

## 2022-11-10 PROCEDURE — 2500000003 HC RX 250 WO HCPCS: Performed by: NURSE ANESTHETIST, CERTIFIED REGISTERED

## 2022-11-10 PROCEDURE — 1200000000 HC SEMI PRIVATE

## 2022-11-10 PROCEDURE — 6370000000 HC RX 637 (ALT 250 FOR IP): Performed by: INTERNAL MEDICINE

## 2022-11-10 PROCEDURE — 2709999900 HC NON-CHARGEABLE SUPPLY: Performed by: INTERNAL MEDICINE

## 2022-11-10 PROCEDURE — 85025 COMPLETE CBC W/AUTO DIFF WBC: CPT

## 2022-11-10 RX ORDER — LIDOCAINE HYDROCHLORIDE 20 MG/ML
INJECTION, SOLUTION EPIDURAL; INFILTRATION; INTRACAUDAL; PERINEURAL PRN
Status: DISCONTINUED | OUTPATIENT
Start: 2022-11-10 | End: 2022-11-10 | Stop reason: SDUPTHER

## 2022-11-10 RX ORDER — LISINOPRIL 20 MG/1
20 TABLET ORAL DAILY
Status: DISCONTINUED | OUTPATIENT
Start: 2022-11-10 | End: 2022-11-12

## 2022-11-10 RX ORDER — CIPROFLOXACIN 2 MG/ML
400 INJECTION, SOLUTION INTRAVENOUS EVERY 12 HOURS
Status: DISCONTINUED | OUTPATIENT
Start: 2022-11-10 | End: 2022-11-12

## 2022-11-10 RX ORDER — PROPOFOL 10 MG/ML
INJECTION, EMULSION INTRAVENOUS PRN
Status: DISCONTINUED | OUTPATIENT
Start: 2022-11-10 | End: 2022-11-10 | Stop reason: SDUPTHER

## 2022-11-10 RX ORDER — MAGNESIUM SULFATE 1 G/100ML
1000 INJECTION INTRAVENOUS PRN
Status: DISCONTINUED | OUTPATIENT
Start: 2022-11-10 | End: 2022-11-13 | Stop reason: HOSPADM

## 2022-11-10 RX ORDER — MAGNESIUM SULFATE IN WATER 40 MG/ML
2000 INJECTION, SOLUTION INTRAVENOUS ONCE
Status: COMPLETED | OUTPATIENT
Start: 2022-11-10 | End: 2022-11-10

## 2022-11-10 RX ORDER — METRONIDAZOLE 500 MG/100ML
500 INJECTION, SOLUTION INTRAVENOUS EVERY 8 HOURS
Status: DISCONTINUED | OUTPATIENT
Start: 2022-11-10 | End: 2022-11-12

## 2022-11-10 RX ORDER — NIFEDIPINE 30 MG/1
60 TABLET, EXTENDED RELEASE ORAL DAILY
Status: DISCONTINUED | OUTPATIENT
Start: 2022-11-10 | End: 2022-11-13 | Stop reason: HOSPADM

## 2022-11-10 RX ORDER — SODIUM CHLORIDE 9 MG/ML
INJECTION, SOLUTION INTRAVENOUS CONTINUOUS
Status: DISCONTINUED | OUTPATIENT
Start: 2022-11-10 | End: 2022-11-10 | Stop reason: HOSPADM

## 2022-11-10 RX ORDER — CONTAINER,EMPTY
1 EACH MISCELLANEOUS
Status: DISCONTINUED | OUTPATIENT
Start: 2022-11-10 | End: 2022-11-13 | Stop reason: HOSPADM

## 2022-11-10 RX ORDER — LINEZOLID 2 MG/ML
600 INJECTION, SOLUTION INTRAVENOUS EVERY 12 HOURS
Status: DISCONTINUED | OUTPATIENT
Start: 2022-11-10 | End: 2022-11-12

## 2022-11-10 RX ADMIN — PREGABALIN 150 MG: 75 CAPSULE ORAL at 22:29

## 2022-11-10 RX ADMIN — INSULIN LISPRO 8 UNITS: 100 INJECTION, SOLUTION INTRAVENOUS; SUBCUTANEOUS at 16:28

## 2022-11-10 RX ADMIN — SODIUM CHLORIDE: 9 INJECTION, SOLUTION INTRAVENOUS at 11:45

## 2022-11-10 RX ADMIN — MAGNESIUM SULFATE HEPTAHYDRATE 2000 MG: 40 INJECTION, SOLUTION INTRAVENOUS at 13:33

## 2022-11-10 RX ADMIN — LIDOCAINE HYDROCHLORIDE 100 MG: 20 INJECTION, SOLUTION EPIDURAL; INFILTRATION; INTRACAUDAL; PERINEURAL at 12:23

## 2022-11-10 RX ADMIN — BUPRENORPHINE AND NALOXONE 1 FILM: 8; 2 FILM BUCCAL; SUBLINGUAL at 08:24

## 2022-11-10 RX ADMIN — TAMSULOSIN HYDROCHLORIDE 0.4 MG: 0.4 CAPSULE ORAL at 08:24

## 2022-11-10 RX ADMIN — ROPINIROLE HYDROCHLORIDE 0.25 MG: 0.25 TABLET, FILM COATED ORAL at 22:29

## 2022-11-10 RX ADMIN — PANTOPRAZOLE SODIUM 40 MG: 40 INJECTION, POWDER, FOR SOLUTION INTRAVENOUS at 08:32

## 2022-11-10 RX ADMIN — PREGABALIN 150 MG: 75 CAPSULE ORAL at 08:23

## 2022-11-10 RX ADMIN — ONDANSETRON 4 MG: 2 INJECTION INTRAMUSCULAR; INTRAVENOUS at 08:30

## 2022-11-10 RX ADMIN — NIFEDIPINE 60 MG: 30 TABLET, EXTENDED RELEASE ORAL at 08:24

## 2022-11-10 RX ADMIN — LINEZOLID 600 MG: 600 INJECTION, SOLUTION INTRAVENOUS at 22:40

## 2022-11-10 RX ADMIN — ACETAMINOPHEN 650 MG: 325 TABLET ORAL at 22:29

## 2022-11-10 RX ADMIN — Medication 10 ML: at 22:30

## 2022-11-10 RX ADMIN — BUPRENORPHINE AND NALOXONE 1 FILM: 8; 2 FILM BUCCAL; SUBLINGUAL at 22:30

## 2022-11-10 RX ADMIN — CIPROFLOXACIN 400 MG: 2 INJECTION, SOLUTION INTRAVENOUS at 22:38

## 2022-11-10 RX ADMIN — METRONIDAZOLE 500 MG: 250 TABLET ORAL at 08:24

## 2022-11-10 RX ADMIN — PROPOFOL 100 MCG/KG/MIN: 10 INJECTION, EMULSION INTRAVENOUS at 12:29

## 2022-11-10 RX ADMIN — ENOXAPARIN SODIUM 40 MG: 100 INJECTION SUBCUTANEOUS at 22:29

## 2022-11-10 RX ADMIN — PROPOFOL 100 MG: 10 INJECTION, EMULSION INTRAVENOUS at 12:23

## 2022-11-10 RX ADMIN — Medication 10 ML: at 08:34

## 2022-11-10 RX ADMIN — METRONIDAZOLE 500 MG: 500 INJECTION, SOLUTION INTRAVENOUS at 16:27

## 2022-11-10 RX ADMIN — PROPOFOL 40 MG: 10 INJECTION, EMULSION INTRAVENOUS at 12:27

## 2022-11-10 RX ADMIN — PANTOPRAZOLE SODIUM 40 MG: 40 INJECTION, POWDER, FOR SOLUTION INTRAVENOUS at 22:29

## 2022-11-10 RX ADMIN — LINEZOLID 600 MG: 600 TABLET, FILM COATED ORAL at 08:24

## 2022-11-10 RX ADMIN — LISINOPRIL 20 MG: 20 TABLET ORAL at 08:24

## 2022-11-10 RX ADMIN — PROPOFOL 60 MG: 10 INJECTION, EMULSION INTRAVENOUS at 12:25

## 2022-11-10 RX ADMIN — CIPROFLOXACIN 400 MG: 2 INJECTION, SOLUTION INTRAVENOUS at 09:18

## 2022-11-10 ASSESSMENT — PAIN SCALES - GENERAL
PAINLEVEL_OUTOF10: 3
PAINLEVEL_OUTOF10: 0

## 2022-11-10 ASSESSMENT — PAIN DESCRIPTION - LOCATION: LOCATION: HEAD

## 2022-11-10 ASSESSMENT — PAIN DESCRIPTION - DESCRIPTORS: DESCRIPTORS: ACHING

## 2022-11-10 ASSESSMENT — PAIN - FUNCTIONAL ASSESSMENT: PAIN_FUNCTIONAL_ASSESSMENT: 0-10

## 2022-11-10 NOTE — PROGRESS NOTES
Patient mentioned to RN that he is to have packing to left amp wound per wound clinic and dressed. RN looked at left leg and wound looks scabbed over and dry. Mild redness to skin. Patient stated \"Yeah I'm supposed to pack that thing every day and put a dressing on it. \" RN will consult wound care to make sure wound orders are correct.

## 2022-11-10 NOTE — PLAN OF CARE
Problem: Discharge Planning  Goal: Discharge to home or other facility with appropriate resources  11/10/2022 0953 by Reba You RN  Outcome: Progressing     Problem: Skin/Tissue Integrity  Goal: Absence of new skin breakdown  Description: 1. Monitor for areas of redness and/or skin breakdown  2. Assess vascular access sites hourly  3. Every 4-6 hours minimum:  Change oxygen saturation probe site  4. Every 4-6 hours:  If on nasal continuous positive airway pressure, respiratory therapy assess nares and determine need for appliance change or resting period.   11/10/2022 0953 by Reba You RN  Outcome: Progressing     Problem: Safety - Adult  Goal: Free from fall injury  11/10/2022 0953 by Reba You RN  Outcome: Progressing     Problem: ABCDS Injury Assessment  Goal: Absence of physical injury  11/10/2022 0953 by Reba You RN  Outcome: Progressing     Problem: Neurosensory - Adult  Goal: Achieves maximal functionality and self care  Outcome: Joseline Menon (Taken 11/9/2022 2155 by Arnie Reinoso, MARITZA)  Achieves maximal functionality and self care:   Monitor swallowing and airway patency with patient fatigue and changes in neurological status   Encourage and assist patient to increase activity and self care with guidance from physical therapy/occupational therapy     Problem: Cardiovascular - Adult  Goal: Maintains optimal cardiac output and hemodynamic stability  Outcome: Progressing  Flowsheets (Taken 11/9/2022 2155 by Arnie Reinoso, RN)  Maintains optimal cardiac output and hemodynamic stability:   Monitor blood pressure and heart rate   Assess for signs of decreased cardiac output   Monitor urine output and notify Licensed Independent Practitioner for values outside of normal range     Problem: Cardiovascular - Adult  Goal: Absence of cardiac dysrhythmias or at baseline  Outcome: Progressing  Flowsheets (Taken 11/9/2022 2155 by Arnie Reinoso, RN)  Absence of cardiac dysrhythmias or at baseline:   Monitor cardiac rate and rhythm   Assess for signs of decreased cardiac output   Administer antiarrhythmia medication and electrolyte replacement as ordered

## 2022-11-10 NOTE — CARE COORDINATION
Discharge Planning Note:   The patient was kicked out of [facility] drug rehab d/t to many health issues. Per the pt [facility] is reviewing his case.

## 2022-11-10 NOTE — H&P
Gastroenterology Note             Pre-operative History and Physical    Patient: Heather Fair  : 1970  CSN:     History Obtained From:  patient and/or guardian. HISTORY OF PRESENT ILLNESS:    The patient is a 47 y.o. male  here for EGD  Very pleasant 72-year-old male who came to the hospital because he felt like he was having vomiting and diarrheal illness  CT scan which showed a thickening of the esophagus were doing an upper endoscopy today to evaluate     Past Medical History:    Past Medical History:   Diagnosis Date    Anemia of infection and chronic disease     Bipolar 1 disorder (HCC)     Diabetes mellitus ([facility]     Diabetic neuropathy ([facility]     Nicotine dependence     Osteomyelitis, multiple sites ([facility]     [facility]      Past Surgical History:    Past Surgical History:   Procedure Laterality Date    LEG AMPUTATION BELOW KNEE Bilateral      Medications Prior to Admission:   No current facility-administered medications on file prior to encounter. Current Outpatient Medications on File Prior to Encounter   Medication Sig Dispense Refill    linezolid (ZYVOX) 600 MG tablet Take 1 tablet by mouth 2 times daily for 15 days 30 tablet 0    ciprofloxacin (CIPRO) 500 MG tablet Take 1 tablet by mouth 2 times daily 60 tablet 0    metroNIDAZOLE (FLAGYL) 500 MG tablet Take 1 tablet by mouth 3 times daily 90 tablet 0    pregabalin (LYRICA) 150 MG capsule Take 150 mg by mouth 2 times daily. buprenorphine-naloxone (SUBOXONE) 8-2 MG SUBL SL tablet Place 1 tablet under the tongue 2 times daily.       rOPINIRole (REQUIP XL) 2 MG extended release tablet Take 2 mg by mouth nightly      tamsulosin (FLOMAX) 0.4 MG capsule Take 0.4 mg by mouth daily      Continuous Blood Gluc Sensor (FREESTYLE MARCELLA 2 SENSOR) MISC 1 each by Does not apply route 4 times daily 1 each 12    insulin glargine (LANTUS SOLOSTAR) 100 UNIT/ML injection pen Inject 18 Units into the skin nightly 5 Adjustable Dose Pre-filled Pen Syringe 3    insulin lispro, 1 Unit Dial, (HUMALOG KWIKPEN) 100 UNIT/ML SOPN Inject 8 Units into the skin 3 times daily (before meals) 43.2 mL 0    Insulin Pen Needle 32G X 5 MM MISC 1 each by Does not apply route daily 100 each 3        Allergies:  Patient has no known allergies. Social History:   Social History     Tobacco Use    Smoking status: Every Day     Packs/day: 0.50     Years: 40.00     Pack years: 20.00     Types: Cigarettes    Smokeless tobacco: Never   Substance Use Topics    Alcohol use: Not Currently     Family History:   No family history on file. PHYSICAL EXAM:      BP (!) 158/82   Pulse 76   Temp 97.5 °F (36.4 °C) (Temporal)   Resp 16   Wt 164 lb (74.4 kg)   SpO2 99%  I        Heart:   RRR, normal s1s2    Lungs:  CTA bilat,  Normal effort    Abdomen:   NT, ND      ASA Grade:  ASA 3 - Patient with moderate systemic disease with functional limitations    Mallampati Class: 2          ASSESSMENT AND PLAN:    1. Patient is a 47 y.o. male here for EGD/ with MAC.   2.  Procedure options, risks and benefits reviewed with patient. Patient expresses understanding.     Jacinta Gotti MD,   [facility]  11/10/2022

## 2022-11-10 NOTE — PROCEDURES
Endoscopy Note    Patient: Ruslan Lott  : 1970  CSN:     Procedure: Esophagogastroduodenoscopy with biopsy    Date:  11/10/2022     Surgeon:  Do Swartz MD     Referring Physician:  Jose Sanders    Preoperative Diagnosis: Abnormal CT scan    Postoperative Diagnosis: Grade C reflux esophagitis by  Centimeter hiatal hernia  Ulcer in the fundus of the stomach likely secondary to vomiting  Gastritis biopsied for H. pylori    Anesthesia: Monitored anesthesia care    EBL: <5 mL    Indications: This is a 47y.o. year old male who is in today because he is got sick yesterday he had severe nausea vomiting and diarrhea he had a CT scan done which showed circumferential thickening of the distal esophagus were doing an upper endoscopy to evaluate    Description of Procedure:  Informed consent was obtained from the patient after explanation of indications, benefits and possible risks and complications of the procedure. The patient was then taken to the endoscopy suite, placed in the left lateral decubitus position and the above IV sedation was administrered.     The Olympus videoendoscope was passed through the hypopharynx     Posterior pharynx was normal    Esophagus at the upper esophageal sphincter and the first portion of the esophagus he did have some inflammation consistent with either vomiting or an esophagitis  Middle esophagus was normal  Distal esophagus had circumferential thickening and erosions and ulcers consistent with a grade C reflux esophagitis I did biopsy here    Hiatal hernia 2 cm    Stomach in the fundus of the stomach we found an ulcer this ulcer had a stigmata of black to it which means that it might of bled at 1 point in time I believe that this ulcer is most likely created by severe vomiting    The distal stomach was very nodular and inflamed we took a biopsy here to look for H. Pylori    Duodenum was normal in the bulb sweep distal duodenum    Retroflexion did show the ulcer and the hiatal hernia      Gastric or Duodenal ulcer present: Yes in the fundus of the      The patient tolerated the procedure well and was taken to the post anesthesia care unit in good condition.       Impression: C reflux esophagitis  Hiatal hernia  Ulcer in the fundus of the stomach  Nodular gastritis      Recommendations: Patient should be on a proton pump inhibitor twice daily    Patient will need to have her repeat upper endoscopy in 4 months ensure that there is healing of the severe esophagitis that he has today    We will continue to monitor this patient while he is in the hospital thank      Michael Sultana MD, MD  [facility]  [phone]

## 2022-11-10 NOTE — PROGRESS NOTES
Pt meets criteria to be transferred back to -pt transferred via stretcher with RN/transport and portable tele monitor

## 2022-11-10 NOTE — ANESTHESIA PRE PROCEDURE
Department of Anesthesiology  Preprocedure Note       Name:  Trace Lyons   Age:  47 y.o.  :  1970                                          MRN:  [id number]         Date:  11/10/2022      Surgeon: Martina Verde):  Dyllan Hadley MD    Procedure: Procedure(s):  EGD DIAGNOSTIC ONLY    Medications prior to admission:   Prior to Admission medications    Medication Sig Start Date End Date Taking? Authorizing Provider   linezolid (ZYVOX) 600 MG tablet Take 1 tablet by mouth 2 times daily for 15 days 11/3/22 11/18/22  Salma Bowers MD   ciprofloxacin (CIPRO) 500 MG tablet Take 1 tablet by mouth 2 times daily 11/3/22 12/3/22  Jose Angel Schwartz MD   metroNIDAZOLE (FLAGYL) 500 MG tablet Take 1 tablet by mouth 3 times daily 11/3/22 12/3/22  Salma Bowesr MD   pregabalin (LYRICA) 150 MG capsule Take 150 mg by mouth 2 times daily. 22   Historical Provider, MD   buprenorphine-naloxone (SUBOXONE) 8-2 MG SUBL SL tablet Place 1 tablet under the tongue 2 times daily.  22   Historical Provider, MD   rOPINIRole (REQUIP XL) 2 MG extended release tablet Take 2 mg by mouth nightly    Historical Provider, MD   tamsulosin (FLOMAX) 0.4 MG capsule Take 0.4 mg by mouth daily 21   Historical Provider, MD   Continuous Blood Gluc Sensor (FREESTYLE MARCELLA 2 SENSOR) MISC 1 each by Does not apply route 4 times daily 10/25/22 10/20/23  Sumeet Magana, APRN - CNP   insulin glargine (LANTUS SOLOSTAR) 100 UNIT/ML injection pen Inject 18 Units into the skin nightly 10/25/22 1/23/23  Sumeet Magana, APRN - CNP   insulin lispro, 1 Unit Dial, (HUMALOG KWIKPEN) 100 UNIT/ML SOPN Inject 8 Units into the skin 3 times daily (before meals) 10/25/22 4/23/23  Sumeet Magana, APRN - CNP   Insulin Pen Needle 32G X 5 MM MISC 1 each by Does not apply route daily 10/25/22   Sumeet Magana, APRN - CNP       Current medications:    Current Facility-Administered Medications   Medication Dose Route Frequency Provider Last Rate Last Admin    Patient Festus Delgado MD        dextrose bolus 10% 125 mL  125 mL IntraVENous SUPRIYA Delgado MD        Or    dextrose bolus 10% 250 mL  250 mL IntraVENous PRN Festus Delgado MD        glucagon (rDNA) injection 1 mg  1 mg SubCUTAneous PRN Festus Delgado MD        dextrose 10 % infusion   IntraVENous Continuous PRCANDICE Delgado MD        buprenorphine-naloxone (SUBOXONE) 8-2 MG SL film 1 Film  1 Film SubLINGual BID Festus Delgado MD   1 Film at 11/10/22 0824    insulin glargine (LANTUS) injection vial 18 Units  18 Units SubCUTAneous Nightly Festus Delgado MD        insulin lispro (HUMALOG) injection vial 8 Units  8 Units SubCUTAneous TID [facility] Festus Delgado MD        pregabalin (LYRICA) capsule 150 mg  150 mg Oral BID Festus Delgado MD   150 mg at 11/10/22 0823    rOPINIRole (REQUIP) tablet 0.25 mg  0.25 mg Oral Nightly Festus Delgado MD   0.25 mg at 11/09/22 2242    tamsulosin (FLOMAX) capsule 0.4 mg  0.4 mg Oral Daily Festus Delgado MD   0.4 mg at 11/10/22 1954    insulin lispro (HUMALOG) injection vial 0-8 Units  0-8 Units SubCUTAneous TID  Festus Delgado MD        insulin lispro (HUMALOG) injection vial 0-4 Units  0-4 Units SubCUTAneous Nightly Festus Delgado MD           Allergies:  No Known Allergies    Problem List:    Patient Active Problem List   Diagnosis Code    Chronic obstructive pulmonary disease ([facility] J44.9    Diabetic polyneuropathy associated with type 2 diabetes mellitus ([facility] E11.42    Erectile dysfunction due to diabetes mellitus ([facility] E11.69, N52.1    ESRD (end stage renal disease) ([facility] N18.6    Failure to thrive in adult R62.7    Type 2 diabetes mellitus ([address] E11.9    Below-knee amputation of left lower extremity ([address] E03.980Y    Below-knee amputation of right lower extremity ([address] U98.225E    Anemia of infection and chronic disease B99.9, D63.8    Bipolar I disorder in remission ([facility] F31.70    Chronic [facility] without hepatic coma [facility] B18.2    Other acute osteomyelitis, multiple sites ([facility] M86.19    Nicotine dependence, cigarettes, uncomplicated S76.354    Polysubstance abuse ([facility] F19.10    Chronic ulcer of right leg with fat layer exposed ([facility] L97.912    MRSA (methicillin resistant Staphylococcus aureus) infection A49.02    Epigastric pain R10.13       Past Medical History:        Diagnosis Date    Anemia of infection and chronic disease     Bipolar 1 disorder ([facility]     Diabetes mellitus ([facility]     Diabetic neuropathy ([facility]     Nicotine dependence     Osteomyelitis, multiple sites ([facility]     Polysubstance abuse ([place] [address].)        Past Surgical History:        Procedure Laterality Date    LEG AMPUTATION BELOW KNEE Bilateral        Social History:    Social History     Tobacco Use    Smoking status: Every Day     Packs/day: 0.50     Years: 40.00     Pack years: 20.00     Types: Cigarettes    Smokeless tobacco: Never   Substance Use Topics    Alcohol use: Not Currently                                Ready to quit: Not Answered  Counseling given: Not Answered      Vital Signs (Current):   Vitals:    11/10/22 0437 11/10/22 0731 11/10/22 0742 11/10/22 0921   BP: (!) 167/89 (!) 174/87  (!) 158/82   Pulse: 81 86  76   Resp: 17 18     Temp: 98.2 °F (36.8 °C) 98.4 °F (36.9 °C)     TempSrc: Oral Oral     SpO2: 97% 96%     Weight:   164 lb 6.4 oz (74.6 kg)                                               BP Readings from Last 3 Encounters:   11/10/22 (!) 158/82   11/07/22 124/68   11/03/22 (!) 176/100       NPO Status:                                                                                 BMI:   Wt Readings from Last 3 Encounters:   11/10/22 164 lb 6.4 oz (74.6 kg)   11/07/22 160 lb (72.6 kg)   11/03/22 160 lb (72.6 kg)     There is no height or weight on file to calculate BMI.    CBC:   Lab Results   Component Value Date/Time    WBC 14.2 11/10/2022 06:00 AM    RBC 4.17 11/10/2022 06:00 AM    HGB 12.4 11/10/2022 06:00 AM    HCT 36.2 11/10/2022 06:00 AM MCV 86.8 11/10/2022 06:00 AM    RDW 13.5 11/10/2022 06:00 AM     11/10/2022 06:00 AM       CMP:   Lab Results   Component Value Date/Time     11/10/2022 06:00 AM    K 4.2 11/10/2022 06:00 AM     11/10/2022 06:00 AM    CO2 22 11/10/2022 06:00 AM    BUN 29 11/10/2022 06:00 AM    CREATININE 1.3 11/10/2022 06:00 AM    AGRATIO 0.6 11/10/2022 06:00 AM    LABGLOM >60 11/10/2022 06:00 AM    GLUCOSE 164 11/10/2022 06:00 AM    PROT 7.4 11/10/2022 06:00 AM    CALCIUM 8.9 11/10/2022 06:00 AM    BILITOT 0.3 11/10/2022 06:00 AM    ALKPHOS 87 11/10/2022 06:00 AM    AST 20 11/10/2022 06:00 AM    ALT 9 11/10/2022 06:00 AM       POC Tests:   Recent Labs     11/10/22  0843   POCGLU 167*       Coags: No results found for: PROTIME, INR, APTT    HCG (If Applicable): No results found for: PREGTESTUR, PREGSERUM, HCG, HCGQUANT     ABGs: No results found for: PHART, PO2ART, LJE9DAK, VPA9YHO, BEART, Y8BGELTE     Type & Screen (If Applicable):  No results found for: LABABO, LABRH    Drug/Infectious Status (If Applicable):  No results found for: HIV, HEPCAB    COVID-19 Screening (If Applicable): No results found for: COVID19        Anesthesia Evaluation  Patient summary reviewed and Nursing notes reviewed no history of anesthetic complications:   Airway: Mallampati: II  TM distance: >3 FB     Mouth opening: > = 3 FB   Dental:    (+) edentulous      Pulmonary:   (+) COPD:                             Cardiovascular:        (-) CABG/stent, dysrhythmias and  angina             ROS comment: Patient Info   Name:     Elmira Long   Age:     51 years   :     1970   Gender:     Male   MRN:     [id number]   Accession #:     [id number]   Ht:     178 cm   Wt:     73 kg   BSA:     1.89 m2   HR:     67 bpm   BP:     169   /     100 mmHg   Heart Rhythm:     Sinus Rhythm   Technical Quality:     Good   Exam Date:     3/7/2022 1:33 PM   Patient Status:     Outpatient     Sonographer:     PREETHI Matt     Exam Type:     ECHOCARDIOGRAM COMPLETE     Study Info   Indications       I42.9 - Cardiomyopathy,  unspecified     Referring Physician:     [id number], Tania De Leon;     [id number]     BMI:     22.96 kg/m2       Summary     1. Severe left ventricular concentric hypertrophy.     2. Mild global LV dysfunction.     3. Left ventricular systolic function is mildly reduced with an ejection   fraction by Biplane Method of Discs of 47 %.     4. Grade 2 diastolic dysfunction with impaired relaxation and elevated left   ventricular filling pressures.     5. Left atrial chamber is mildly enlarged with a left atrial volume index of   36 ml/m2 by BP MOD. Neuro/Psych:   (+) neuromuscular disease (DM polyneuropathy):, psychiatric history:             ROS comment: H/o illicit drug use. Has not used for 6 weeks. Neg tox screen. suboxone given this am GI/Hepatic/Renal:   (+) hepatitis: C, liver disease:,          ROS comment: Vomited after taking pill this am.  No vomiting since  No longer on HD. Access removed. .   Endo/Other:    (+) Diabetes, electrolyte abnormalities, . ROS comment: oteomyelitis Abdominal:             Vascular:     - DVT and PE. Other Findings:           Anesthesia Plan      MAC     ASA 3       Induction: intravenous. Anesthetic plan and risks discussed with patient. Plan discussed with CRNA.                     Toya Waldron MD   11/10/2022

## 2022-11-10 NOTE — CONSULTS
Gastroenterology Consult Note        Patient: Terri Fox  : 1970  Acct#:      Date:  11/10/2022    Subjective:       History of Present Illness  Patient is a 47 y.o.  male admitted with Epigastric pain [R10.13]  Esophagitis [K20.90]  Elevated troponin [R77.8]  History of osteomyelitis [Z87.39]  Nausea vomiting and diarrhea [R11.2, R19.7]  Below knee amputation ([facility] [S88.119A] who is seen in consult for epigastric pain and esophagitis on CT. Patient with history of polysubstance abuse. States no alcohol in years. Per chart, he admitted to using heroin and meth 6 weeks ago. History of hepatitis C but virus was cleared with treatment. RNA level was - 2021. He has seen GI at [facility] before for cirrhosis. I am unable to access EGD and colonoscopy reports. History of diabetes, COPD, bilateral BKA. History of stump osteomyelitis and is on Zyvox, Cipro, Flagyl. Patient thinks he has been on these antibiotics for 3 or 4 days. He came to the ER yesterday for 1 day history of nausea, vomiting, diarrhea. Denies any black stools, bloody stools, hematemesis. Denies any abdominal pain. Denies chronic nausea or vomiting. The diarrhea is not necessarily new but he is very vague about this. CT abdomen pelvis with distal esophageal wall thickening and gallstones. Denies dysphagia, heartburn. Denies NSAID use. Past Medical History:   Diagnosis Date    Anemia of infection and chronic disease     Bipolar 1 disorder ([address]     Diabetes mellitus ([address]     Diabetic neuropathy ([address]     Nicotine dependence     Osteomyelitis, multiple sites ([address]     Polysubstance abuse ([address]       Past Surgical History:   Procedure Laterality Date    LEG AMPUTATION BELOW KNEE Bilateral       Past Endoscopic History  Colonoscopy 2021 at [facility] for diarrhea. I can't find op note but found path. A. Colon, Random, biopsy:  Colonic mucosa without diagnostic abnormality.     B. Colon, Sigmoid, polyp(s), polypectomy:  Colonic mucosa with a reactive-appearing lymphoid aggregate. Negative for dysplasia. Admission Meds  No current facility-administered medications on file prior to encounter. Current Outpatient Medications on File Prior to Encounter   Medication Sig Dispense Refill    linezolid (ZYVOX) 600 MG tablet Take 1 tablet by mouth 2 times daily for 15 days 30 tablet 0    ciprofloxacin (CIPRO) 500 MG tablet Take 1 tablet by mouth 2 times daily 60 tablet 0    metroNIDAZOLE (FLAGYL) 500 MG tablet Take 1 tablet by mouth 3 times daily 90 tablet 0    pregabalin (LYRICA) 150 MG capsule Take 150 mg by mouth 2 times daily. buprenorphine-naloxone (SUBOXONE) 8-2 MG SUBL SL tablet Place 1 tablet under the tongue 2 times daily. rOPINIRole (REQUIP XL) 2 MG extended release tablet Take 2 mg by mouth nightly      tamsulosin (FLOMAX) 0.4 MG capsule Take 0.4 mg by mouth daily      Continuous Blood Gluc Sensor (FREESTYLE MARCELLA 2 SENSOR) MISC 1 each by Does not apply route 4 times daily 1 each 12    insulin glargine (LANTUS SOLOSTAR) 100 UNIT/ML injection pen Inject 18 Units into the skin nightly 5 Adjustable Dose Pre-filled Pen Syringe 3    insulin lispro, 1 Unit Dial, (HUMALOG KWIKPEN) 100 UNIT/ML SOPN Inject 8 Units into the skin 3 times daily (before meals) 43.2 mL 0    Insulin Pen Needle 32G X 5 MM MISC 1 each by Does not apply route daily 100 each 3        Allergies  No Known Allergies   Social   Social History     Tobacco Use    Smoking status: Every Day     Packs/day: 0.50     Years: 40.00     Pack years: 20.00     Types: Cigarettes    Smokeless tobacco: Never   Substance Use Topics    Alcohol use: Not Currently        No family history on file.         Review of Systems  Constitutional: negative for fevers, chills, sweats    Ears, nose, mouth, throat, and face: negative for nasal congestion and sore throat   Respiratory: negative for cough and shortness of breath   Cardiovascular: negative for chest pain and dyspnea   Gastrointestinal: see hpi   Genitourinary:negative for dysuria and frequency   Integument/breast: negative for pruritus and rash   Hematologic/lymphatic: negative for bleeding and easy bruising   Musculoskeletal:negative for arthralgias and myalgias   Neurological: negative for dizziness and weakness         Physical Exam  Blood pressure (!) 174/87, pulse 86, temperature 98.4 °F (36.9 °C), temperature source Oral, resp. rate 18, weight 164 lb 6.4 oz (74.6 kg), SpO2 96 %. General appearance: alert, cooperative, no distress, appears stated age  Eyes: Anicteric  Head: Normocephalic, without obvious abnormality  Lungs: clear to auscultation bilaterally, Normal Effort  Heart: regular rate and rhythm, normal S1 and S2, no murmurs or rubs  Abdomen: soft, non-tender. Bowel sounds normal. No masses,  no organomegaly. Extremities: atraumatic, no cyanosis or edema  Skin: warm and dry, no jaundice  Neuro: Grossly intact, A&OX3  Musculoskeletal: 5/5  strength BUE      Data Review:    Recent Labs     11/09/22  1210 11/10/22  0600   WBC 15.1* 14.2*   HGB 13.2* 12.4*   HCT 39.1* 36.2*   MCV 88.6 86.8    243     Recent Labs     11/09/22  1210 11/10/22  0600    137   K 4.4 4.2    104   CO2 26 22   PHOS  --  3.6   BUN 29* 29*   CREATININE 1.1 1.3     Recent Labs     11/09/22  1210 11/10/22  0600   AST 23 20   ALT 10 9*   BILITOT 0.3 0.3   ALKPHOS 134* 87     Recent Labs     11/09/22  1210   LIPASE 16.0     No results for input(s): PROTIME, INR in the last 72 hours. No results for input(s): PTT in the last 72 hours. No results for input(s): OCCULTBLD in the last 72 hours. Imaging Studies:               CT-scan of abdomen and pelvis wo contrast 11/9/22:  Impression   Circumferential wall thickening of the distal esophagus is partially   visualized and consistent with a esophagitis or esophageal neoplasm. Direct   visualization may be indicated.        Cholelithiasis, no CT evidence of cholecystitis       Osteoarthritis of the right hip                      Assessment:     Nausea, vomiting, diarrhea -newer onset of nausea and vomiting. Patient reports intermittent diarrhea more chronically. CT with wall thickening of the distal esophagus as well as gallstones but no signs of cholecystitis. Esophageal wall thickening could be from esophagitis from repeated vomiting. He may have a gastroenteritis or symptoms could be from antibiotics. Stool studies ordered. Cirrhosis -prior diagnosis of this at [facility]. History of hepatitis C -patient reports prior treatment at [facility] and cleared the virus. RNA level was negative 9/2021. History of polysubstance abuse    Recommendations:   -Check stool for C.diff, GI bacterial pathogens PCR, and EIA for parasites   -NPO  -PPI  -EGD today  -Complete drug cessation.     Discussed with Dr. Min Brandt, [address] [place] [address]

## 2022-11-10 NOTE — PROGRESS NOTES
Hospitalist Progress Note      PCP: RG Cadena - CNP    Date of Admission: 11/9/2022    Chief Complaint: Epigastric pain    Hospital Course: Gamaliel Guzmán is a 47 y.o. male with history of polysubstance abuse on Suboxone (heroin and meth, last use 6 weeks ago), current smoker, COPD, IDDM, s/p bilateral BKA with chronic stump ulcerations and left amputation stump osteomyelitis with MRSA previously on weekly Orbactiv for 6 weeks and currently on oral Zyvox, Cipro and Flagyl, right eye blindness, presented with complaints of malaise, NBNB vomiting with nausea and copious watery diarrhea for several days and burning epigastric pain. Epigastric pain constant, severe, nonradiating, no aggravating or relieving factors. He also endorsed myalgias, cough, congestion and fatigue. Patient was residing at the [address] [place] [address] but was released today because of his medical comorbidities and needs. He is currently waiting to be accepted at [facility]. He denied any fevers, melena, hematochezia, or hematemesis. CT in the ED and showed esophageal thickening concerning for esophagitis versus malignancy. Subjective: Patient seen and examined. Stable epigastric pain. Still with diarrhea, nausea and vomiting  Scheduled for EGD today.        Medications:  Reviewed    Infusion Medications    sodium chloride      dextrose       Scheduled Medications    Empty 3-in-1 Mixing Container  1 each Does not apply Prior to discharge    NIFEdipine  60 mg Oral Daily    lisinopril  20 mg Oral Daily    ciprofloxacin  400 mg IntraVENous Q12H    linezolid  600 mg IntraVENous Q12H    metroNIDAZOLE  500 mg IntraVENous Q8H    magnesium sulfate  2,000 mg IntraVENous Once    pantoprazole  40 mg IntraVENous BID    sodium chloride flush  5-40 mL IntraVENous 2 times per day    enoxaparin  40 mg SubCUTAneous Nightly    buprenorphine-naloxone  1 Film SubLINGual BID    insulin glargine  18 Units SubCUTAneous Nightly    insulin lispro  8 Units SubCUTAneous TID AC    pregabalin  150 mg Oral BID    rOPINIRole  0.25 mg Oral Nightly    tamsulosin  0.4 mg Oral Daily    insulin lispro  0-8 Units SubCUTAneous TID WC    insulin lispro  0-4 Units SubCUTAneous Nightly     PRN Meds: magnesium sulfate, sodium chloride flush, sodium chloride, ondansetron **OR** ondansetron, polyethylene glycol, acetaminophen **OR** acetaminophen, glucose, dextrose bolus **OR** dextrose bolus, glucagon (rDNA), dextrose      Intake/Output Summary (Last 24 hours) at 11/10/2022 1127  Last data filed at 11/10/2022 3964  Gross per 24 hour   Intake --   Output 1100 ml   Net -1100 ml       Physical Exam Performed:    BP (!) 158/82   Pulse 76   Temp 98.4 °F (36.9 °C) (Oral)   Resp 18   Wt 164 lb 6.4 oz (74.6 kg)   SpO2 96%     General appearance: No apparent distress, appears stated age and cooperative. HEENT: Pupils equal, round, and reactive to light. Conjunctivae/corneas clear. Neck: Supple, with full range of motion. No jugular venous distention. Trachea midline. Respiratory:  Normal respiratory effort. Clear to auscultation, bilaterally without Rales/Wheezes/Rhonchi. Cardiovascular: Regular rate and rhythm with normal S1/S2 without murmurs, rubs or gallops. Abdomen: Soft, non-tender, non-distended with normal bowel sounds. Musculoskeletal: No clubbing, cyanosis or edema bilaterally. Full range of motion without deformity. Skin: Skin color, texture, turgor normal.  No rashes or lesions. Neurologic:  Neurovascularly intact without any focal sensory/motor deficits.  Cranial nerves: II-XII intact, grossly non-focal.  Psychiatric: Alert and oriented, thought content appropriate, normal insight  Capillary Refill: Brisk, 3 seconds, normal   Peripheral Pulses: +2 palpable, equal bilaterally       Labs:   Recent Labs     11/09/22  1210 11/10/22  0600   WBC 15.1* 14.2*   HGB 13.2* 12.4*   HCT 39.1* 36.2*    243     Recent Labs 11/09/22  1210 11/10/22  0600    137   K 4.4 4.2    104   CO2 26 22   BUN 29* 29*   CREATININE 1.1 1.3   CALCIUM 9.5 8.9   PHOS  --  3.6     Recent Labs     11/09/22  1210 11/10/22  0600   AST 23 20   ALT 10 9*   BILITOT 0.3 0.3   ALKPHOS 134* 87     No results for input(s): INR in the last 72 hours. Recent Labs     11/09/22  1210   TROPONINI 0.08*       Urinalysis:      Lab Results   Component Value Date/Time    NITRU Negative 11/09/2022 03:36 PM    WBCUA 2 11/09/2022 03:36 PM    BACTERIA None Seen 11/09/2022 03:36 PM    RBCUA 27 11/09/2022 03:36 PM    BLOODU LARGE 11/09/2022 03:36 PM    SPECGRAV 1.015 11/09/2022 03:36 PM    GLUCOSEU 250 11/09/2022 03:36 PM       Radiology:  XR CHEST PORTABLE   Final Result   No significant findings in the chest.         CT ABDOMEN PELVIS WO CONTRAST Additional Contrast? None   Final Result   Circumferential wall thickening of the distal esophagus is partially   visualized and consistent with a esophagitis or esophageal neoplasm. Direct   visualization may be indicated. Cholelithiasis, no CT evidence of cholecystitis      Osteoarthritis of the right hip                 Assessment/Plan:    Active Hospital Problems    Diagnosis     Epigastric pain [R10.13]      Priority: Medium          Epigastric pain:  CT concerning for esophagitis versus underlying malignancy. Continue Protonix. GI consulted: Appreciate input. Scheduled for EGD today. Left BKA stump osteomyelitis:  Followed by Dr. Abner Lam.  Continue IV Zyvox, Cipro and Flagyl. Transition back to oral when stable. Diarrhea: Follow-up C. difficile and GI PCR. Uncontrolled HTN: Started on amlodipine and lisinopril. T2 DM on Long term Insulin with hyperglycemia:  Follow-up A1c. Continue Basal/prandial insulin with SSI. Polysubstance abuse: Waiting for acceptance at [facility]. Continue Suboxone. Current smoker: Smoking cessation counseling provided.          DVT Prophylaxis: Lovenox  Diet: Diet NPO Exceptions are: Sips of Water with Meds  Code Status: Full Code  PT/OT Eval Status: Pending    Dispo -once medically stable      Festus Delgado MD

## 2022-11-10 NOTE — ANESTHESIA POSTPROCEDURE EVALUATION
Department of Anesthesiology  Postprocedure Note    Patient: Reinaldo Rodriguez  MRN: [id number]  YOB: 1970  Date of evaluation: 11/10/2022      Procedure Summary     Date: 11/10/22 Room / Location: [address] [place] [address]    Anesthesia Start: 4685 Anesthesia Stop: 5959    Procedure: EGD BIOPSY (Abdomen) Diagnosis:       Abnormal CT of the abdomen      (Abnormal CT of the abdomen [R93.5])    Surgeons: Jacinta Gotti MD Responsible Provider: Heriberto Olivares MD    Anesthesia Type: MAC ASA Status: 3          Anesthesia Type: No value filed. Emma Phase I: Emma Score: 10    Emma Phase II:        Anesthesia Post Evaluation    Patient location during evaluation: PACU  Patient participation: complete - patient participated  Level of consciousness: awake  Airway patency: patent  Nausea & Vomiting: no vomiting  Complications: no  Cardiovascular status: hemodynamically stable  Respiratory status: acceptable  Hydration status: euvolemic  There was medical reason for not using a multimodal analgesia pain management approach.

## 2022-11-10 NOTE — PROGRESS NOTES
Vitals:    11/10/22 0731   BP: (!) 174/87   Pulse: 86   Resp: 18   Temp: 98.4 °F (36.9 °C)   SpO2: 96%     Patient given morning meds and patient then stated he was going to be sick. He vomitted clear liquid. Zofran then given. Patient unable to take cipro as he stated, \"I can't- the pills are making me sick\". Patient was also incont of bowel- not enough for bm sample. Patient changed from pants to brief. Afebrile. Patient resting in bed.

## 2022-11-10 NOTE — PROGRESS NOTES
Home meds bagged and sent to pharmacy, cigarettes and lighter obtained by security. Electronically signed by Delio Norwood RN on 11/10/2022 at 2:30 AM

## 2022-11-10 NOTE — PROGRESS NOTES
Pt arrived to endo preop. Pt is calm and receptive to plan of care. Pt in contact isolation. Teaching / education initiated regarding perioperative experience, expectations, and pain management during stay. Patient verbalized understanding.

## 2022-11-10 NOTE — PLAN OF CARE
Problem: Discharge Planning  Goal: Discharge to home or other facility with appropriate resources  Outcome: Progressing  Flowsheets (Taken 11/9/2022 2155)  Discharge to home or other facility with appropriate resources: Identify barriers to discharge with patient and caregiver     Problem: Skin/Tissue Integrity  Goal: Absence of new skin breakdown  Description: 1. Monitor for areas of redness and/or skin breakdown  2. Assess vascular access sites hourly  3. Every 4-6 hours minimum:  Change oxygen saturation probe site  4. Every 4-6 hours:  If on nasal continuous positive airway pressure, respiratory therapy assess nares and determine need for appliance change or resting period.   Outcome: Progressing     Problem: Safety - Adult  Goal: Free from fall injury  Outcome: Progressing     Problem: ABCDS Injury Assessment  Goal: Absence of physical injury  Outcome: Progressing     Problem: Neurosensory - Adult  Goal: Achieves maximal functionality and self care  Recent Flowsheet Documentation  Taken 11/9/2022 2155 by Kirsten Cowden, RN  Achieves maximal functionality and self care:   Monitor swallowing and airway patency with patient fatigue and changes in neurological status   Encourage and assist patient to increase activity and self care with guidance from physical therapy/occupational therapy     Problem: Cardiovascular - Adult  Goal: Maintains optimal cardiac output and hemodynamic stability  Recent Flowsheet Documentation  Taken 11/9/2022 2155 by Kirsten Cowden, RN  Maintains optimal cardiac output and hemodynamic stability:   Monitor blood pressure and heart rate   Assess for signs of decreased cardiac output   Monitor urine output and notify Licensed Independent Practitioner for values outside of normal range  Goal: Absence of cardiac dysrhythmias or at baseline  Recent Flowsheet Documentation  Taken 11/9/2022 2155 by Kirsten Cowden, RN  Absence of cardiac dysrhythmias or at baseline:   Monitor cardiac rate and rhythm   Assess for signs of decreased cardiac output   Administer antiarrhythmia medication and electrolyte replacement as ordered     Problem: Skin/Tissue Integrity - Adult  Goal: Skin integrity remains intact  Recent Flowsheet Documentation  Taken 11/9/2022 2155 by Daly Gonzalez RN  Skin Integrity Remains Intact:   Monitor for areas of redness and/or skin breakdown   Assess vascular access sites hourly     Problem: Musculoskeletal - Adult  Goal: Return mobility to safest level of function  Recent Flowsheet Documentation  Taken 11/9/2022 2155 by Daly Gonzalez RN  Return Mobility to Safest Level of Function:   Assess patient stability and activity tolerance for standing, transferring and ambulating with or without assistive devices   Assist with transfers and ambulation using safe patient handling equipment as needed   Obtain physical therapy/occupational therapy consults as needed   Instruct patient/family in ordered activity level  Goal: Return ADL status to a safe level of function  Recent Flowsheet Documentation  Taken 11/9/2022 2155 by Daly Gonzalez RN  Return ADL Status to a Safe Level of Function:   Administer medication as ordered   Assess activities of daily living deficits and provide assistive devices as needed   Obtain physical therapy/occupational therapy consults as needed   Assist and instruct patient to increase activity and self care as tolerated     Problem: Gastrointestinal - Adult  Goal: Minimal or absence of nausea and vomiting  Recent Flowsheet Documentation  Taken 11/9/2022 2155 by Daly Gonzalez RN  Minimal or absence of nausea and vomiting:   Administer ordered antiemetic medications as needed   Provide nonpharmacologic comfort measures as appropriate  Goal: Maintains or returns to baseline bowel function  Recent Flowsheet Documentation  Taken 11/9/2022 2155 by Daly Gonzalez RN  Maintains or returns to baseline bowel function: Assess bowel function  Goal: Maintains adequate nutritional intake  Recent Flowsheet Documentation  Taken 11/9/2022 2155 by Jan Medina RN  Maintains adequate nutritional intake: Monitor intake and output, weight and lab values     Problem: Genitourinary - Adult  Goal: Absence of urinary retention  Recent Flowsheet Documentation  Taken 11/9/2022 2155 by Jan Medina RN  Absence of urinary retention: Assess patients ability to void and empty bladder     Problem: Infection - Adult  Goal: Absence of infection at discharge  Recent Flowsheet Documentation  Taken 11/9/2022 2155 by Jan Medina RN  Absence of infection at discharge:   Assess and monitor for signs and symptoms of infection   Monitor lab/diagnostic results   Monitor all insertion sites i.e., indwelling lines, tubes and drains   Administer medications as ordered   Instruct and encourage patient and family to use good hand hygiene technique  Goal: Absence of infection during hospitalization  Recent Flowsheet Documentation  Taken 11/9/2022 2155 by Jan Medina RN  Absence of infection during hospitalization:   Assess and monitor for signs and symptoms of infection   Monitor lab/diagnostic results   Monitor all insertion sites i.e., indwelling lines, tubes and drains   Administer medications as ordered   Instruct and encourage patient and family to use good hand hygiene technique     Problem: Metabolic/Fluid and Electrolytes - Adult  Goal: Electrolytes maintained within normal limits  Recent Flowsheet Documentation  Taken 11/9/2022 2155 by Jan Medina RN  Electrolytes maintained within normal limits:   Monitor labs and assess patient for signs and symptoms of electrolyte imbalances   Administer electrolyte replacement as ordered   Monitor response to electrolyte replacements, including repeat lab results as appropriate  Goal: Hemodynamic stability and optimal renal function maintained  Recent Flowsheet Documentation  Taken 11/9/2022 2155 by Jan Medina RN  Hemodynamic stability and optimal renal function maintained:   Monitor labs and assess for signs and symptoms of volume excess or deficit   Monitor intake, output and patient weight   Monitor urine specific gravity, serum osmolarity and serum sodium as indicated or ordered   Monitor response to interventions for patient's volume status, including labs, urine output, blood pressure (other measures as available)  Goal: Glucose maintained within prescribed range  Recent Flowsheet Documentation  Taken 11/9/2022 2155 by Jignesh Alicae RN  Glucose maintained within prescribed range:   Monitor blood glucose as ordered   Assess for signs and symptoms of hyperglycemia and hypoglycemia   Administer ordered medications to maintain glucose within target range   Assess barriers to adequate nutritional intake and initiate nutrition consult as needed     Problem: Hematologic - Adult  Goal: Maintains hematologic stability  Recent Flowsheet Documentation  Taken 11/9/2022 2155 by Jignesh Alicea RN  Maintains hematologic stability:   Assess for signs and symptoms of bleeding or hemorrhage   Monitor labs for bleeding or clotting disorders

## 2022-11-10 NOTE — PROGRESS NOTES
Pt arrived from ENDO to PACU bay 6. Reported received from ENDO staff. Pt arousable to voice. Pt on 10 L simple mask. NSR, and VSS. Will continue to monitor.

## 2022-11-11 LAB
A/G RATIO: 0.7 (ref 1.1–2.2)
ALBUMIN SERPL-MCNC: 3 G/DL (ref 3.4–5)
ALP BLD-CCNC: 81 U/L (ref 40–129)
ALT SERPL-CCNC: 10 U/L (ref 10–40)
ANION GAP SERPL CALCULATED.3IONS-SCNC: 8 MMOL/L (ref 3–16)
AST SERPL-CCNC: 23 U/L (ref 15–37)
BASOPHILS ABSOLUTE: 0.1 K/UL (ref 0–0.2)
BASOPHILS RELATIVE PERCENT: 1.6 %
BILIRUB SERPL-MCNC: <0.2 MG/DL (ref 0–1)
BUN BLDV-MCNC: 31 MG/DL (ref 7–20)
C DIFF TOXIN/ANTIGEN: NORMAL
CALCIUM SERPL-MCNC: 8.8 MG/DL (ref 8.3–10.6)
CHLORIDE BLD-SCNC: 99 MMOL/L (ref 99–110)
CO2: 26 MMOL/L (ref 21–32)
CREAT SERPL-MCNC: 1.5 MG/DL (ref 0.9–1.3)
EOSINOPHILS ABSOLUTE: 0.2 K/UL (ref 0–0.6)
EOSINOPHILS RELATIVE PERCENT: 2.6 %
GFR SERPL CREATININE-BSD FRML MDRD: 56 ML/MIN/{1.73_M2}
GLUCOSE BLD-MCNC: 109 MG/DL (ref 70–99)
GLUCOSE BLD-MCNC: 118 MG/DL (ref 70–99)
GLUCOSE BLD-MCNC: 133 MG/DL (ref 70–99)
GLUCOSE BLD-MCNC: 138 MG/DL (ref 70–99)
GLUCOSE BLD-MCNC: 173 MG/DL (ref 70–99)
GLUCOSE BLD-MCNC: 237 MG/DL (ref 70–99)
GLUCOSE BLD-MCNC: 52 MG/DL (ref 70–99)
GLUCOSE BLD-MCNC: 67 MG/DL (ref 70–99)
HCT VFR BLD CALC: 34.2 % (ref 40.5–52.5)
HEMOGLOBIN: 11.9 G/DL (ref 13.5–17.5)
LYMPHOCYTES ABSOLUTE: 2.5 K/UL (ref 1–5.1)
LYMPHOCYTES RELATIVE PERCENT: 28.1 %
MAGNESIUM: 2.3 MG/DL (ref 1.8–2.4)
MCH RBC QN AUTO: 30.3 PG (ref 26–34)
MCHC RBC AUTO-ENTMCNC: 34.8 G/DL (ref 31–36)
MCV RBC AUTO: 87.1 FL (ref 80–100)
MONOCYTES ABSOLUTE: 0.5 K/UL (ref 0–1.3)
MONOCYTES RELATIVE PERCENT: 6.1 %
NEUTROPHILS ABSOLUTE: 5.4 K/UL (ref 1.7–7.7)
NEUTROPHILS RELATIVE PERCENT: 61.6 %
PDW BLD-RTO: 14 % (ref 12.4–15.4)
PERFORMED ON: ABNORMAL
PHOSPHORUS: 4 MG/DL (ref 2.5–4.9)
PLATELET # BLD: 216 K/UL (ref 135–450)
PMV BLD AUTO: 8 FL (ref 5–10.5)
POTASSIUM SERPL-SCNC: 4.9 MMOL/L (ref 3.5–5.1)
RBC # BLD: 3.92 M/UL (ref 4.2–5.9)
SODIUM BLD-SCNC: 133 MMOL/L (ref 136–145)
TOTAL PROTEIN: 7.3 G/DL (ref 6.4–8.2)
TROPONIN: 0.11 NG/ML
WBC # BLD: 8.8 K/UL (ref 4–11)

## 2022-11-11 PROCEDURE — 1200000000 HC SEMI PRIVATE

## 2022-11-11 PROCEDURE — 87324 CLOSTRIDIUM AG IA: CPT

## 2022-11-11 PROCEDURE — 6370000000 HC RX 637 (ALT 250 FOR IP): Performed by: INTERNAL MEDICINE

## 2022-11-11 PROCEDURE — 94760 N-INVAS EAR/PLS OXIMETRY 1: CPT

## 2022-11-11 PROCEDURE — 87506 IADNA-DNA/RNA PROBE TQ 6-11: CPT

## 2022-11-11 PROCEDURE — 2580000003 HC RX 258: Performed by: INTERNAL MEDICINE

## 2022-11-11 PROCEDURE — C9113 INJ PANTOPRAZOLE SODIUM, VIA: HCPCS | Performed by: INTERNAL MEDICINE

## 2022-11-11 PROCEDURE — 87328 CRYPTOSPORIDIUM AG IA: CPT

## 2022-11-11 PROCEDURE — 80053 COMPREHEN METABOLIC PANEL: CPT

## 2022-11-11 PROCEDURE — 36415 COLL VENOUS BLD VENIPUNCTURE: CPT

## 2022-11-11 PROCEDURE — 84484 ASSAY OF TROPONIN QUANT: CPT

## 2022-11-11 PROCEDURE — 87449 NOS EACH ORGANISM AG IA: CPT

## 2022-11-11 PROCEDURE — 84100 ASSAY OF PHOSPHORUS: CPT

## 2022-11-11 PROCEDURE — 85025 COMPLETE CBC W/AUTO DIFF WBC: CPT

## 2022-11-11 PROCEDURE — 83735 ASSAY OF MAGNESIUM: CPT

## 2022-11-11 PROCEDURE — 2500000003 HC RX 250 WO HCPCS: Performed by: INTERNAL MEDICINE

## 2022-11-11 PROCEDURE — 87336 ENTAMOEB HIST DISPR AG IA: CPT

## 2022-11-11 PROCEDURE — 6360000002 HC RX W HCPCS: Performed by: INTERNAL MEDICINE

## 2022-11-11 RX ORDER — PANTOPRAZOLE SODIUM 40 MG/1
40 TABLET, DELAYED RELEASE ORAL
Status: DISCONTINUED | OUTPATIENT
Start: 2022-11-11 | End: 2022-11-13 | Stop reason: HOSPADM

## 2022-11-11 RX ADMIN — SODIUM CHLORIDE 25 ML: 9 INJECTION, SOLUTION INTRAVENOUS at 09:16

## 2022-11-11 RX ADMIN — PREGABALIN 150 MG: 75 CAPSULE ORAL at 22:12

## 2022-11-11 RX ADMIN — ORITAVANCIN 1200 MG: 400 INJECTION, POWDER, LYOPHILIZED, FOR SOLUTION INTRAVENOUS at 17:03

## 2022-11-11 RX ADMIN — METRONIDAZOLE 500 MG: 500 INJECTION, SOLUTION INTRAVENOUS at 02:09

## 2022-11-11 RX ADMIN — LISINOPRIL 20 MG: 20 TABLET ORAL at 09:06

## 2022-11-11 RX ADMIN — LINEZOLID 600 MG: 600 INJECTION, SOLUTION INTRAVENOUS at 10:28

## 2022-11-11 RX ADMIN — BUPRENORPHINE AND NALOXONE 1 FILM: 8; 2 FILM BUCCAL; SUBLINGUAL at 09:05

## 2022-11-11 RX ADMIN — CIPROFLOXACIN 400 MG: 2 INJECTION, SOLUTION INTRAVENOUS at 09:17

## 2022-11-11 RX ADMIN — LINEZOLID 600 MG: 600 INJECTION, SOLUTION INTRAVENOUS at 22:23

## 2022-11-11 RX ADMIN — Medication 10 ML: at 09:05

## 2022-11-11 RX ADMIN — TAMSULOSIN HYDROCHLORIDE 0.4 MG: 0.4 CAPSULE ORAL at 09:06

## 2022-11-11 RX ADMIN — PANTOPRAZOLE SODIUM 40 MG: 40 INJECTION, POWDER, FOR SOLUTION INTRAVENOUS at 09:03

## 2022-11-11 RX ADMIN — PANTOPRAZOLE SODIUM 40 MG: 40 TABLET, DELAYED RELEASE ORAL at 16:58

## 2022-11-11 RX ADMIN — Medication 10 ML: at 22:29

## 2022-11-11 RX ADMIN — ROPINIROLE HYDROCHLORIDE 0.25 MG: 0.25 TABLET, FILM COATED ORAL at 22:12

## 2022-11-11 RX ADMIN — INSULIN LISPRO 2 UNITS: 100 INJECTION, SOLUTION INTRAVENOUS; SUBCUTANEOUS at 11:28

## 2022-11-11 RX ADMIN — NIFEDIPINE 60 MG: 30 TABLET, EXTENDED RELEASE ORAL at 09:05

## 2022-11-11 RX ADMIN — BUPRENORPHINE AND NALOXONE 1 FILM: 8; 2 FILM BUCCAL; SUBLINGUAL at 22:13

## 2022-11-11 RX ADMIN — METRONIDAZOLE 500 MG: 500 INJECTION, SOLUTION INTRAVENOUS at 09:18

## 2022-11-11 RX ADMIN — ENOXAPARIN SODIUM 40 MG: 100 INJECTION SUBCUTANEOUS at 22:12

## 2022-11-11 RX ADMIN — METRONIDAZOLE 500 MG: 500 INJECTION, SOLUTION INTRAVENOUS at 22:25

## 2022-11-11 RX ADMIN — CIPROFLOXACIN 400 MG: 2 INJECTION, SOLUTION INTRAVENOUS at 22:19

## 2022-11-11 RX ADMIN — INSULIN LISPRO 8 UNITS: 100 INJECTION, SOLUTION INTRAVENOUS; SUBCUTANEOUS at 11:28

## 2022-11-11 RX ADMIN — PREGABALIN 150 MG: 75 CAPSULE ORAL at 09:05

## 2022-11-11 NOTE — PLAN OF CARE
Problem: Discharge Planning  Goal: Discharge to home or other facility with appropriate resources  Outcome: Progressing  Flowsheets (Taken 11/10/2022 2130)  Discharge to home or other facility with appropriate resources: Identify barriers to discharge with patient and caregiver     Problem: Skin/Tissue Integrity  Goal: Absence of new skin breakdown  Description: 1. Monitor for areas of redness and/or skin breakdown  2. Assess vascular access sites hourly  3. Every 4-6 hours minimum:  Change oxygen saturation probe site  4. Every 4-6 hours:  If on nasal continuous positive airway pressure, respiratory therapy assess nares and determine need for appliance change or resting period.   Outcome: Progressing     Problem: Safety - Adult  Goal: Free from fall injury  Outcome: Progressing     Problem: ABCDS Injury Assessment  Goal: Absence of physical injury  Outcome: Progressing     Problem: Neurosensory - Adult  Goal: Achieves maximal functionality and self care  Outcome: Progressing  Flowsheets (Taken 11/10/2022 2130)  Achieves maximal functionality and self care: Monitor swallowing and airway patency with patient fatigue and changes in neurological status     Problem: Cardiovascular - Adult  Goal: Maintains optimal cardiac output and hemodynamic stability  Outcome: Progressing  Flowsheets (Taken 11/10/2022 2130)  Maintains optimal cardiac output and hemodynamic stability:   Monitor blood pressure and heart rate   Monitor urine output and notify Licensed Independent Practitioner for values outside of normal range   Assess for signs of decreased cardiac output  Goal: Absence of cardiac dysrhythmias or at baseline  Outcome: Progressing  Flowsheets (Taken 11/10/2022 2130)  Absence of cardiac dysrhythmias or at baseline:   Monitor cardiac rate and rhythm   Assess for signs of decreased cardiac output     Problem: Skin/Tissue Integrity - Adult  Goal: Skin integrity remains intact  Outcome: Progressing  Flowsheets (Taken 11/10/2022 2130)  Skin Integrity Remains Intact:   Monitor for areas of redness and/or skin breakdown   Assess vascular access sites hourly     Problem: Musculoskeletal - Adult  Goal: Return mobility to safest level of function  Outcome: Progressing  Flowsheets (Taken 11/10/2022 2130)  Return Mobility to Safest Level of Function:   Assess patient stability and activity tolerance for standing, transferring and ambulating with or without assistive devices   Assist with transfers and ambulation using safe patient handling equipment as needed   Obtain physical therapy/occupational therapy consults as needed  Goal: Return ADL status to a safe level of function  Outcome: Progressing  Flowsheets (Taken 11/10/2022 2130)  Return ADL Status to a Safe Level of Function:   Administer medication as ordered   Assess activities of daily living deficits and provide assistive devices as needed   Obtain physical therapy/occupational therapy consults as needed   Assist and instruct patient to increase activity and self care as tolerated     Problem: Gastrointestinal - Adult  Goal: Minimal or absence of nausea and vomiting  Outcome: Progressing  Flowsheets (Taken 11/10/2022 2130)  Minimal or absence of nausea and vomiting:   Administer ordered antiemetic medications as needed   Provide nonpharmacologic comfort measures as appropriate  Goal: Maintains or returns to baseline bowel function  Outcome: Progressing  Flowsheets (Taken 11/10/2022 2130)  Maintains or returns to baseline bowel function: Assess bowel function  Goal: Maintains adequate nutritional intake  Outcome: Progressing  Flowsheets (Taken 11/10/2022 2130)  Maintains adequate nutritional intake: Monitor intake and output, weight and lab values     Problem: Genitourinary - Adult  Goal: Absence of urinary retention  Outcome: Progressing  Flowsheets (Taken 11/10/2022 2130)  Absence of urinary retention:   Assess patients ability to void and empty bladder   Monitor intake/output and perform bladder scan as needed     Problem: Infection - Adult  Goal: Absence of infection at discharge  Outcome: Progressing  Flowsheets (Taken 11/10/2022 2130)  Absence of infection at discharge:   Assess and monitor for signs and symptoms of infection   Monitor lab/diagnostic results   Monitor all insertion sites i.e., indwelling lines, tubes and drains   Administer medications as ordered  Goal: Absence of infection during hospitalization  Outcome: Progressing  Flowsheets (Taken 11/10/2022 2130)  Absence of infection during hospitalization:   Assess and monitor for signs and symptoms of infection   Monitor lab/diagnostic results   Monitor all insertion sites i.e., indwelling lines, tubes and drains   Administer medications as ordered     Problem: Metabolic/Fluid and Electrolytes - Adult  Goal: Electrolytes maintained within normal limits  Outcome: Progressing  Flowsheets (Taken 11/10/2022 2130)  Electrolytes maintained within normal limits:   Monitor labs and assess patient for signs and symptoms of electrolyte imbalances   Administer electrolyte replacement as ordered   Monitor response to electrolyte replacements, including repeat lab results as appropriate  Goal: Hemodynamic stability and optimal renal function maintained  Outcome: Progressing  Flowsheets (Taken 11/10/2022 2130)  Hemodynamic stability and optimal renal function maintained:   Monitor labs and assess for signs and symptoms of volume excess or deficit   Monitor intake, output and patient weight   Monitor urine specific gravity, serum osmolarity and serum sodium as indicated or ordered   Monitor response to interventions for patient's volume status, including labs, urine output, blood pressure (other measures as available)  Goal: Glucose maintained within prescribed range  Outcome: Progressing  Flowsheets (Taken 11/10/2022 2130)  Glucose maintained within prescribed range:   Monitor blood glucose as ordered   Assess for signs and symptoms of hyperglycemia and hypoglycemia   Administer ordered medications to maintain glucose within target range     Problem: Hematologic - Adult  Goal: Maintains hematologic stability  Outcome: Progressing  Flowsheets (Taken 11/10/2022 2130)  Maintains hematologic stability:   Assess for signs and symptoms of bleeding or hemorrhage   Monitor labs for bleeding or clotting disorders     Problem: Chronic Conditions and Co-morbidities  Goal: Patient's chronic conditions and co-morbidity symptoms are monitored and maintained or improved  Outcome: Progressing  Flowsheets (Taken 11/10/2022 2130)  Care Plan - Patient's Chronic Conditions and Co-Morbidity Symptoms are Monitored and Maintained or Improved:   Monitor and assess patient's chronic conditions and comorbid symptoms for stability, deterioration, or improvement   Collaborate with multidisciplinary team to address chronic and comorbid conditions and prevent exacerbation or deterioration   Update acute care plan with appropriate goals if chronic or comorbid symptoms are exacerbated and prevent overall improvement and discharge     Problem: Pain  Goal: Verbalizes/displays adequate comfort level or baseline comfort level  Outcome: Progressing  Flowsheets (Taken 11/10/2022 2215)  Verbalizes/displays adequate comfort level or baseline comfort level:   Encourage patient to monitor pain and request assistance   Assess pain using appropriate pain scale   Administer analgesics based on type and severity of pain and evaluate response   Implement non-pharmacological measures as appropriate and evaluate response

## 2022-11-11 NOTE — PLAN OF CARE
Problem: Discharge Planning  Goal: Discharge to home or other facility with appropriate resources  Outcome: Progressing  Flowsheets (Taken 11/11/2022 0902)  Discharge to home or other facility with appropriate resources:   Identify barriers to discharge with patient and caregiver   Arrange for needed discharge resources and transportation as appropriate   Identify discharge learning needs (meds, wound care, etc)   Refer to discharge planning if patient needs post-hospital services based on physician order or complex needs related to functional status, cognitive ability or social support system     Problem: Skin/Tissue Integrity  Goal: Absence of new skin breakdown  Description: 1. Monitor for areas of redness and/or skin breakdown  2. Assess vascular access sites hourly  3. Every 4-6 hours minimum:  Change oxygen saturation probe site  4. Every 4-6 hours:  If on nasal continuous positive airway pressure, respiratory therapy assess nares and determine need for appliance change or resting period.   Outcome: Progressing     Problem: Safety - Adult  Goal: Free from fall injury  Outcome: Progressing     Problem: ABCDS Injury Assessment  Goal: Absence of physical injury  Outcome: Progressing     Problem: Neurosensory - Adult  Goal: Achieves maximal functionality and self care  Outcome: Progressing  Flowsheets (Taken 11/11/2022 0902)  Achieves maximal functionality and self care:   Monitor swallowing and airway patency with patient fatigue and changes in neurological status   Encourage and assist patient to increase activity and self care with guidance from physical therapy/occupational therapy   Encourage visually impaired, hearing impaired and aphasic patients to use assistive/communication devices     Problem: Cardiovascular - Adult  Goal: Maintains optimal cardiac output and hemodynamic stability  Outcome: Progressing  Flowsheets (Taken 11/11/2022 0902)  Maintains optimal cardiac output and hemodynamic stability:   Monitor blood pressure and heart rate   Monitor urine output and notify Licensed Independent Practitioner for values outside of normal range   Assess for signs of decreased cardiac output   Administer fluid and/or volume expanders as ordered  Goal: Absence of cardiac dysrhythmias or at baseline  Outcome: Progressing  Flowsheets (Taken 11/11/2022 0902)  Absence of cardiac dysrhythmias or at baseline:   Monitor cardiac rate and rhythm   Assess for signs of decreased cardiac output   Administer antiarrhythmia medication and electrolyte replacement as ordered     Problem: Skin/Tissue Integrity - Adult  Goal: Skin integrity remains intact  Outcome: Progressing  Flowsheets (Taken 11/11/2022 0902)  Skin Integrity Remains Intact:   Monitor for areas of redness and/or skin breakdown   Assess vascular access sites hourly     Problem: Musculoskeletal - Adult  Goal: Return mobility to safest level of function  Outcome: Progressing  Flowsheets (Taken 11/11/2022 0902)  Return Mobility to Safest Level of Function:   Assess patient stability and activity tolerance for standing, transferring and ambulating with or without assistive devices   Assist with transfers and ambulation using safe patient handling equipment as needed   Ensure adequate protection for wounds/incisions during mobilization   Obtain physical therapy/occupational therapy consults as needed   Apply continuous passive motion per provider or physical therapy orders to increase flexion toward goal   Instruct patient/family in ordered activity level  Goal: Return ADL status to a safe level of function  Outcome: Progressing  Flowsheets (Taken 11/11/2022 0902)  Return ADL Status to a Safe Level of Function:   Administer medication as ordered   Assess activities of daily living deficits and provide assistive devices as needed   Obtain physical therapy/occupational therapy consults as needed   Assist and instruct patient to increase activity and self care as tolerated     Problem: Gastrointestinal - Adult  Goal: Minimal or absence of nausea and vomiting  Outcome: Progressing  Flowsheets (Taken 11/11/2022 0902)  Minimal or absence of nausea and vomiting:   Administer IV fluids as ordered to ensure adequate hydration   Maintain NPO status until nausea and vomiting are resolved   Administer ordered antiemetic medications as needed   Provide nonpharmacologic comfort measures as appropriate   Advance diet as tolerated, if ordered   Nutrition consult to assist patient with adequate nutrition and appropriate food choices  Goal: Maintains or returns to baseline bowel function  Outcome: Progressing  Flowsheets (Taken 11/11/2022 0902)  Maintains or returns to baseline bowel function:   Assess bowel function   Encourage oral fluids to ensure adequate hydration   Administer IV fluids as ordered to ensure adequate hydration   Administer ordered medications as needed   Encourage mobilization and activity   Nutrition consult to assist patient with appropriate food choices  Goal: Maintains adequate nutritional intake  Outcome: Progressing  Flowsheets (Taken 11/11/2022 0902)  Maintains adequate nutritional intake:   Monitor percentage of each meal consumed   Identify factors contributing to decreased intake, treat as appropriate   Assist with meals as needed   Monitor intake and output, weight and lab values   Obtain nutritional consult as needed     Problem: Genitourinary - Adult  Goal: Absence of urinary retention  Outcome: Progressing  Flowsheets (Taken 11/11/2022 0902)  Absence of urinary retention:   Assess patients ability to void and empty bladder   Monitor intake/output and perform bladder scan as needed   Place urinary catheter per Licensed Independent Practitioner order if needed   Discuss catheterization for long term situations as appropriate   Discuss with Licensed Independent Practitioner  medications to alleviate retention as needed     Problem: Infection - Adult  Goal: Absence of infection at discharge  Outcome: Progressing  Flowsheets (Taken 11/11/2022 0902)  Absence of infection at discharge:   Assess and monitor for signs and symptoms of infection   Monitor lab/diagnostic results   Monitor all insertion sites i.e., indwelling lines, tubes and drains   Administer medications as ordered   Instruct and encourage patient and family to use good hand hygiene technique   Identify and instruct in appropriate isolation precautions for identified infection/condition  Goal: Absence of infection during hospitalization  Outcome: Progressing  Flowsheets (Taken 11/11/2022 0902)  Absence of infection during hospitalization:   Assess and monitor for signs and symptoms of infection   Monitor lab/diagnostic results   Monitor all insertion sites i.e., indwelling lines, tubes and drains   Administer medications as ordered   Instruct and encourage patient and family to use good hand hygiene technique   Identify and instruct in appropriate isolation precautions for identified infection/condition     Problem: Metabolic/Fluid and Electrolytes - Adult  Goal: Electrolytes maintained within normal limits  Outcome: Progressing  Flowsheets (Taken 11/11/2022 0902)  Electrolytes maintained within normal limits:   Monitor labs and assess patient for signs and symptoms of electrolyte imbalances   Administer electrolyte replacement as ordered   Monitor response to electrolyte replacements, including repeat lab results as appropriate   Fluid restriction as ordered   Instruct patient on fluid and nutrition restrictions as appropriate  Goal: Hemodynamic stability and optimal renal function maintained  Outcome: Progressing  Flowsheets (Taken 11/11/2022 0902)  Hemodynamic stability and optimal renal function maintained:   Monitor labs and assess for signs and symptoms of volume excess or deficit   Monitor intake, output and patient weight   Encourage oral intake as appropriate   Instruct patient on fluid and nutrition restrictions as appropriate  Goal: Glucose maintained within prescribed range  Outcome: Progressing  Flowsheets (Taken 11/11/2022 0902)  Glucose maintained within prescribed range:   Monitor blood glucose as ordered   Assess for signs and symptoms of hyperglycemia and hypoglycemia   Administer ordered medications to maintain glucose within target range   Assess barriers to adequate nutritional intake and initiate nutrition consult as needed   Instruct patient on self management of diabetes and initiate consult as needed     Problem: Hematologic - Adult  Goal: Maintains hematologic stability  Outcome: Progressing  Flowsheets (Taken 11/11/2022 0902)  Maintains hematologic stability:   Assess for signs and symptoms of bleeding or hemorrhage   Monitor labs for bleeding or clotting disorders   Administer blood products/factors as ordered     Problem: Chronic Conditions and Co-morbidities  Goal: Patient's chronic conditions and co-morbidity symptoms are monitored and maintained or improved  Outcome: Progressing  Flowsheets (Taken 11/11/2022 0902)  Care Plan - Patient's Chronic Conditions and Co-Morbidity Symptoms are Monitored and Maintained or Improved:   Monitor and assess patient's chronic conditions and comorbid symptoms for stability, deterioration, or improvement   Collaborate with multidisciplinary team to address chronic and comorbid conditions and prevent exacerbation or deterioration   Update acute care plan with appropriate goals if chronic or comorbid symptoms are exacerbated and prevent overall improvement and discharge     Problem: Pain  Goal: Verbalizes/displays adequate comfort level or baseline comfort level  Outcome: Progressing

## 2022-11-11 NOTE — PROGRESS NOTES
Hospitalist Progress Note      PCP: Latricia Van, APRN - CNP    Date of Admission: 11/9/2022    Chief Complaint: Epigastric pain    Hospital Course: Ayad Pearson is a 47 y.o. male with history of polysubstance abuse on Suboxone (heroin and meth, last use 6 weeks ago), current smoker, COPD, IDDM, s/p bilateral BKA with chronic stump ulcerations and left amputation stump osteomyelitis with MRSA previously on weekly Orbactiv for 6 weeks and currently on oral Zyvox, Cipro and Flagyl, right eye blindness, presented with complaints of malaise, NBNB vomiting with nausea and copious watery diarrhea for several days and burning epigastric pain. Epigastric pain constant, severe, nonradiating, no aggravating or relieving factors. He also endorsed myalgias, cough, congestion and fatigue. Patient was residing at the [address] [place] [address] but was released today because of his medical comorbidities and needs. He is currently waiting to be accepted at [facility]. He denied any fevers, melena, hematochezia, or hematemesis. CT in the ED and showed esophageal thickening concerning for esophagitis versus malignancy. Subjective: Patient seen and examined. No epigastric pain. Diarrhea, nausea and vomiting resolved.         Medications:  Reviewed    Infusion Medications    sodium chloride 100 mL/hr at 11/11/22 1025    dextrose       Scheduled Medications    pantoprazole  40 mg Oral BID AC    Empty 3-in-1 Mixing Container  1 each Does not apply Prior to discharge    NIFEdipine  60 mg Oral Daily    lisinopril  20 mg Oral Daily    ciprofloxacin  400 mg IntraVENous Q12H    linezolid  600 mg IntraVENous Q12H    metroNIDAZOLE  500 mg IntraVENous Q8H    orbactiv (ORITAVANCIN DIPHOSPATE) infusion  1,200 mg IntraVENous Once    sodium chloride flush  5-40 mL IntraVENous 2 times per day    enoxaparin  40 mg SubCUTAneous Nightly    buprenorphine-naloxone  1 Film SubLINGual BID    insulin glargine 18 Units SubCUTAneous Nightly    insulin lispro  8 Units SubCUTAneous TID AC    pregabalin  150 mg Oral BID    rOPINIRole  0.25 mg Oral Nightly    tamsulosin  0.4 mg Oral Daily    insulin lispro  0-8 Units SubCUTAneous TID WC    insulin lispro  0-4 Units SubCUTAneous Nightly     PRN Meds: magnesium sulfate, sodium chloride flush, sodium chloride, ondansetron **OR** ondansetron, polyethylene glycol, acetaminophen **OR** acetaminophen, glucose, dextrose bolus **OR** dextrose bolus, glucagon (rDNA), dextrose      Intake/Output Summary (Last 24 hours) at 11/11/2022 1208  Last data filed at 11/11/2022 1001  Gross per 24 hour   Intake 810 ml   Output 1800 ml   Net -990 ml       Physical Exam Performed:    BP (!) 158/91   Pulse 68   Temp 98.2 °F (36.8 °C) (Oral)   Resp 18   Wt 163 lb 14.4 oz (74.3 kg)   SpO2 98%     General appearance: Chronically ill looking, appears comfortable. Well nourished  Eyes: Sclera clear, pupils equal  ENT: Moist mucus membranes, no thrush. Trachea midline. Cardiovascular: Regular rhythm, normal S1, S2. No murmur, gallop, rub. No edema in lower extremities  Respiratory: Clear to auscultation bilaterally, no wheeze, good inspiratory effort  Gastrointestinal: Abdomen soft, non-tender, not distended, normal bowel sounds  Musculoskeletal: No cyanosis in digits, neck supple. Bilateral BKA's with superficial stump ulcerations. Neurology: Cranial nerves grossly intact. Alert and oriented in time, place and person. No speech or motor deficits  Psychiatry: Appropriate affect.  Not agitated  Skin: Warm, dry, normal turgor, no rash  Brisk capillary refill, peripheral pulses palpable       Labs:   Recent Labs     11/09/22  1210 11/10/22  0600 11/11/22  0447   WBC 15.1* 14.2* 8.8   HGB 13.2* 12.4* 11.9*   HCT 39.1* 36.2* 34.2*    243 216     Recent Labs     11/09/22  1210 11/10/22  0600 11/11/22  0447    137 133*   K 4.4 4.2 4.9    104 99   CO2 26 22 26   BUN 29* 29* 31* CREATININE 1.1 1.3 1.5*   CALCIUM 9.5 8.9 8.8   PHOS  --  3.6 4.0     Recent Labs     11/09/22  1210 11/10/22  0600 11/11/22  0447   AST 23 20 23   ALT 10 9* 10   BILITOT 0.3 0.3 <0.2   ALKPHOS 134* 87 81     No results for input(s): INR in the last 72 hours. Recent Labs     11/09/22  1210   TROPONINI 0.08*       Urinalysis:      Lab Results   Component Value Date/Time    NITRU Negative 11/09/2022 03:36 PM    WBCUA 2 11/09/2022 03:36 PM    BACTERIA None Seen 11/09/2022 03:36 PM    RBCUA 27 11/09/2022 03:36 PM    BLOODU LARGE 11/09/2022 03:36 PM    SPECGRAV 1.015 11/09/2022 03:36 PM    GLUCOSEU 250 11/09/2022 03:36 PM       Radiology:  XR CHEST PORTABLE   Final Result   No significant findings in the chest.         CT ABDOMEN PELVIS WO CONTRAST Additional Contrast? None   Final Result   Circumferential wall thickening of the distal esophagus is partially   visualized and consistent with a esophagitis or esophageal neoplasm. Direct   visualization may be indicated. Cholelithiasis, no CT evidence of cholecystitis      Osteoarthritis of the right hip                 Assessment/Plan:    Active Hospital Problems    Diagnosis     Epigastric pain [R10.13]      Priority: Medium          Epigastric pain due to severe esophagitis:  CT concerning for esophagitis versus underlying malignancy. GI consulted: s/p EGD 11/10 with findings of reflux esophagitis, Hiatal hernia, ulcer in the fundus of the stomach & Nodular gastritis. Recommended PPI BID and repeat EGD in 4 weeks. Left BKA stump osteomyelitis:  Followed by Dr. Amber Caruso. Currently on IV Zyvox, Cipro and Flagyl. Will give dose for Orbactiv when ready for discharge. 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 Follow up repeat Troponin. No complaints of chest pain. Diarrhea: Resolved. Follow-up C. difficile and GI PCR. Uncontrolled HTN: Monitor BP on amlodipine and lisinopril.   Monitor BMP given mild increase in Scr.      T2 DM on Long term Insulin with hyperglycemia:  A1c 5.7% on 11/10/22. Continue Basal/prandial insulin with SSI. Polysubstance abuse: Waiting for acceptance at [facility]. Continue Suboxone. Current smoker: Smoking cessation counseling provided. DVT Prophylaxis: Lovenox  Diet: ADULT DIET; Regular; 4 carb choices (60 gm/meal); GI [place] (GERD/Peptic Ulcer)  Code Status: Full Code  PT/OT Eval Status: wheel chair bound.     Dispo - once medically stable      Alem Walker MD

## 2022-11-11 NOTE — PROGRESS NOTES
Nancie Quiroz, operations support, from [facility] wants CW to reach out to [facility] (clinical director) [phone]. Christiano's number is [phone].

## 2022-11-11 NOTE — CARE COORDINATION
SW called Satinder Bateman at [facility] who stated they have not been able to secure the Suboxone medication so unsure if they are able to accept for SNF. They are still reviewing and will follow up with case management tomorrow. RN talked to Pharmacy who already mixed and sent up the medication Oritavancin, which is an antibiotic that only needs administration 1x/week. SW asked RN if we could wait to administer this medication because it may hinder patient from being able to qualify for daily SNF for IV meds at [facility]. RN spoke to pharmacy who stated the med was \"compounded\" and needed to be given withing 12 hours which would be by 3am tonight. Pharmacy stated that it's not something they can take back because it costs $16,000 and [email or web address] is paying the bill for it. Patient will have to receive this medication, oritavancin. Case mgmt needs to follow up with [facility] Saturday, inform this medication was given and ask if that hinders SNF qualification, and if they were able to secure the Suboxone medication. If patient is not ineligible for SNF anymore, we will need to set up weekly outpatient infusion appointments for patient for the next 6 weeks (every Friday for 6 weeks), then figure a way to get transportation assistance to send patient to a homeless shelter. Patient has no friends or family, nobody to help him or even give a ride. Patient has an electric wheelchair in the room because he is wheelchair bound from bilateral BKAs. [facility] and [facility] [employer] are the only companies that can provide wheelchair transport. Lyft is not an option. Patient does not qualify for stretcher transport, so pt cannot go by stretcher unless a company wants to do us a one time favor for transport.     Electronically signed by CORNELIO Quintero LSW on 11/11/2022 at 4:52 PM

## 2022-11-11 NOTE — CARE COORDINATION
Discharge Planning Note:  Spoke with pt, he is from [facility], They cannot take him back at this time because he has to many medical issues going on. They do not have nursing at their facility. [facility], [facility] is taking a look at him. They are going to try to skill him for nursing and the IV for daily does. They cannot skill him for once a week dosing.    Waiting on pending referral.         Electronically signed by Sofia Edwards RN on 11/11/2022 at 4:09 PM

## 2022-11-11 NOTE — PROGRESS NOTES
Called by patient with report of low blood sugar. Blood sugar checked, 52. Orange juice given, oleksandr crackers and peanut butter. Hospitalist notified. Will recheck blood sugar in 15 min.     Recheck of blood sugar 133

## 2022-11-11 NOTE — CARE COORDINATION
[facility] Wound Ostomy Continence Nurse  Consult Note       NAME:  Deepak Rojas  MEDICAL RECORD NUMBER:  [id number]  AGE: 47 y.o. GENDER: male  : 1970  TODAY'S DATE:  2022    Subjective   Reason for WOCN Evaluation and Assessment: wounds to right and left lower extremities      Deepak Rojas is a 47 y.o. male referred by:   [x] Physician  [] Nursing  [] Other:     Wound Identification:  Wound Type: non-healing surgical  Contributing Factors: diabetes and smoking    Wound History: present on admission  Current Wound Care Treatment:  open to air    Patient Goal of Care:  [x] Wound Healing  [] Odor Control  [] Palliative Care  [] Pain Control   [] Other:         PAST MEDICAL HISTORY        Diagnosis Date    Anemia of infection and chronic disease     Bipolar 1 disorder (HCC)     Diabetes mellitus ([facility]     Diabetic neuropathy ([facility]     Nicotine dependence     Osteomyelitis, multiple sites ([facility]     Polysubstance abuse ([facility]        PAST SURGICAL HISTORY    Past Surgical History:   Procedure Laterality Date    LEG AMPUTATION BELOW KNEE Bilateral     UPPER GASTROINTESTINAL ENDOSCOPY N/A 11/10/2022    EGD BIOPSY performed by Kwame Covarrubias MD at [place] [address]    No family history on file. SOCIAL HISTORY    Social History     Tobacco Use    Smoking status: Every Day     Packs/day: 0.50     Years: 40.00     Pack years: 20.00     Types: Cigarettes    Smokeless tobacco: Never   Substance Use Topics    Alcohol use: Not Currently    Drug use: Not Currently       ALLERGIES    No Known Allergies    MEDICATIONS    No current facility-administered medications on file prior to encounter.      Current Outpatient Medications on File Prior to Encounter   Medication Sig Dispense Refill    linezolid (ZYVOX) 600 MG tablet Take 1 tablet by mouth 2 times daily for 15 days 30 tablet 0    ciprofloxacin (CIPRO) 500 MG tablet Take 1 tablet by mouth 2 times daily 60 tablet 0    metroNIDAZOLE (FLAGYL) 500 MG tablet Take 1 tablet by mouth 3 times daily 90 tablet 0    pregabalin (LYRICA) 150 MG capsule Take 150 mg by mouth 2 times daily. buprenorphine-naloxone (SUBOXONE) 8-2 MG SUBL SL tablet Place 1 tablet under the tongue 2 times daily.       rOPINIRole (REQUIP XL) 2 MG extended release tablet Take 2 mg by mouth nightly      tamsulosin (FLOMAX) 0.4 MG capsule Take 0.4 mg by mouth daily      Continuous Blood Gluc Sensor (FREESTYLE MARCELLA 2 SENSOR) MISC 1 each by Does not apply route 4 times daily 1 each 12    insulin glargine (LANTUS SOLOSTAR) 100 UNIT/ML injection pen Inject 18 Units into the skin nightly 5 Adjustable Dose Pre-filled Pen Syringe 3    insulin lispro, 1 Unit Dial, (HUMALOG KWIKPEN) 100 UNIT/ML SOPN Inject 8 Units into the skin 3 times daily (before meals) 43.2 mL 0    Insulin Pen Needle 32G X 5 MM MISC 1 each by Does not apply route daily 100 each 3       Objective    BP (!) 158/91   Pulse 68   Temp 98.2 °F (36.8 °C) (Oral)   Resp 18   Wt 163 lb 14.4 oz (74.3 kg)   SpO2 98%     LABS:  WBC:    Lab Results   Component Value Date/Time    WBC 8.8 11/11/2022 04:47 AM     H/H:    Lab Results   Component Value Date/Time    HGB 11.9 11/11/2022 04:47 AM    HCT 34.2 11/11/2022 04:47 AM     PTT:  No results found for: APTT, PTT[APTT}  PT/INR:  No results found for: PROTIME, INR  HgBA1c:    Lab Results   Component Value Date/Time    LABA1C 5.7 11/10/2022 06:00 AM       Assessment   Sai Risk Score: Sai Scale Score: 16    Patient Active Problem List   Diagnosis Code    Chronic obstructive pulmonary disease (HCC) J44.9    Diabetic polyneuropathy associated with type 2 diabetes mellitus (HCC) E11.42    Erectile dysfunction due to diabetes mellitus (HCC) E11.69, N52.1    ESRD (end stage renal disease) ([facility]) N18.6    Failure to thrive in adult R62.7    Type 2 diabetes mellitus ([facility] E11.9    Below-knee amputation of left lower extremity ([facility] M63.700C    Below-knee amputation of right lower extremity ([facility] K72.106L    Anemia of infection and chronic disease B99.9, D63.8    Bipolar I disorder in remission ([address] F31.70    Chronic hepatitis C without hepatic coma (HCC) B18.2    Other acute osteomyelitis, multiple sites ([address] M86.19    Nicotine dependence, cigarettes, uncomplicated N20.766    Polysubstance abuse ([address] F19.10    Chronic ulcer of right leg with fat layer exposed ([address] L97.912    MRSA (methicillin resistant Staphylococcus aureus) infection A49.02    Epigastric pain R10.13       Measurements:  Wound 10/31/22 Leg Right;Distal #1 (Active)   Wound Image   11/11/22 1423   Wound Etiology Other 11/11/22 1423   Dressing Status New dressing applied 11/11/22 1423   Wound Cleansed Cleansed with saline 11/11/22 1423   Dressing/Treatment Other (comment) 11/11/22 1423   Dressing Change Due 11/12/22 11/11/22 1423   Wound Length (cm) 1 cm 11/11/22 1423   Wound Width (cm) 1.3 cm 11/11/22 1423   Wound Depth (cm) 0 cm 11/07/22 1316   Wound Surface Area (cm^2) 1.3 cm^2 11/11/22 1423   Change in Wound Size % (l*w) -2066.67 11/11/22 1423   Wound Volume (cm^3) 0 cm^3 11/07/22 1316   Wound Healing % 100 11/07/22 1316   Wound Assessment Dry 11/11/22 1423   Drainage Amount None 11/11/22 1423   Drainage Description Serosanguinous 11/11/22 0105   Odor None 11/11/22 1200   Verónica-wound Assessment Intact 11/11/22 1423   Margins Attached edges 11/11/22 1200   Number of days: 11       Wound 10/31/22 Leg Left;Distal #2 (Active)   Wound Image   11/11/22 1423   Wound Etiology Non-Healing Surgical 11/11/22 1423   Dressing Status New dressing applied 11/11/22 1423   Wound Cleansed Cleansed with saline 11/11/22 1423   Dressing/Treatment Collagen;Dry dressing 11/11/22 1423   Offloading for Diabetic Foot Ulcers Offloading not required 11/11/22 0105   Dressing Change Due 11/18/22 11/11/22 1423   Wound Length (cm) 0.2 cm 11/07/22 1316   Wound Width (cm) 0.2 cm 11/07/22 1316   Wound Depth (cm) 2 cm 11/07/22 1316   Wound Surface Area (cm^2) 0.04 cm^2 11/07/22 1316   Change in Wound Size % (l*w) 0 11/07/22 1316   Wound Volume (cm^3) 0.08 cm^3 11/07/22 1316   Wound Healing % -33 11/07/22 1316   Post-Procedure Length (cm) 0.2 cm 10/31/22 1430   Post-Procedure Width (cm) 0.2 cm 10/31/22 1430   Post-Procedure Depth (cm) 1.5 cm 10/31/22 1430   Post-Procedure Surface Area (cm^2) 0.04 cm^2 10/31/22 1430   Post-Procedure Volume (cm^3) 0.06 cm^3 10/31/22 1430   Wound Assessment Dry 11/11/22 1423   Drainage Amount None 11/11/22 1423   Drainage Description Serosanguinous 11/11/22 0105   Odor None 11/11/22 1200   Verónica-wound Assessment Intact 11/11/22 1423   Margins Defined edges 11/11/22 1200   Wound Thickness Description not for Pressure Injury Full thickness 11/11/22 0105   Number of days: 11      Patient requesting healed wounds to be covered. Patient last seen in wound center 11/7/22 with dr Eugenia Jones. Patient is bilateral legs amputee. The right bka  has scabbed wounds. The left bka  has a scabbed vertical wound that is closed. Patient said it was deep and had drainage, but today it is scabbed and closed. No drainage, no edema, redness or pain. Last visit in wound center, abx ointment was used on the right bka wound and collagen was used on the left bkawound. Both were covered with a dry dressing. Patient reports he will call next week for his next appointment. Will use betadine ointment on the right wound and collagen on the left wound. Patient agreeable to plan. Wounds were cleansed with saline. Betadine ointment applied  to right bka wound,  collagen was placed to leftbkawound. Both were covered with a dry dressing. Left leg amputation site    Right leg amputation site    Response to treatment:  Well tolerated by patient.      Pain Assessment:  Severity:  0 / 10  Quality of pain: N/A  Wound Pain Timing/Severity: none  Premedicated: No    Plan   Plan of Care: Wound 10/31/22 Leg Right;Distal #1-Dressing/Treatment: Other (comment) (betadine ointment)  Wound 10/31/22 Leg Left;Distal #2-Dressing/Treatment: Collagen, Dry dressing  Dry wound to right bka; cleanse with saline, apply betadine ointment (left in room with wound care supplies in gray basin) cover with dry dressing. Change daily. Left aka wound: cleanse with saline, apply collagen dressing(puracol dressing, left in room with wound care supplies, cut to fit wound) , cover with dry dressing. Change collagen dressing every 7 days. Thank you. Specialty Bed Required : No   [] Low Air Loss   [] Pressure Redistribution  [] Fluid Immersion  [] Bariatric  [] Total Pressure Relief  [] Other:     Current Diet: ADULT DIET;  Regular; 4 carb choices (60 gm/meal); GI Cloud (GERD/Peptic Ulcer)  Dietician consult:  No    Discharge Plan:  Placement for patient upon discharge: home with support    Patient appropriate for Outpatient [address]: Yes    Referrals:  []   [] [employer]  [] Supplies  [] Other    Patient/Caregiver Teaching:  Level of patient/caregiver understanding able to:   [x] Indicates understanding       [] Needs reinforcement  [] Unsuccessful      [] Verbal Understanding  [] Demonstrated understanding       [] No evidence of learning  [] Refused teaching         [] N/A       Electronically signed by  JOE LaresN, RN, [address] [place], [place] [phone]  on 11/11/2022 at 2:31 PM No

## 2022-11-11 NOTE — PROGRESS NOTES
Gastroenterology Progress Note      Rehana Levine is a 47 y.o. male patient. Principal Problem:    Epigastric pain  Resolved Problems:    * No resolved hospital problems. *      SUBJECTIVE: Patient had undergone an upper endoscopy yesterday was found to have a severe erosive esophagitis  Seeds were done they are continuing to be pending at this time    ROS:  No fever, chills  No chest pain, palpitations  No SOB, cough  Gastrointestinal ROS: no abdominal pain, nausea, vomiting     Physical    VITALS:  BP 93/61   Pulse 68   Temp 98.2 °F (36.8 °C) (Oral)   Resp 18   Wt 163 lb 14.4 oz (74.3 kg)   SpO2 98%   TEMPERATURE:  Current - Temp: 98.2 °F (36.8 °C); Max - Temp  Av °F (36.7 °C)  Min: 97.8 °F (36.6 °C)  Max: 98.2 °F (36.8 °C)    NAD  RRR, Nl s1s2  Lungs CTA Bilaterally, normal effort  Abdomen soft, ND, NT, no HSM, Bowel sounds normal.    Data    Data Review:    Recent Labs     220 11/10/22  0622  0447   WBC 15.1* 14.2* 8.8   HGB 13.2* 12.4* 11.9*   HCT 39.1* 36.2* 34.2*   MCV 88.6 86.8 87.1    243 216     Recent Labs     22  1210 11/10/22  0622  0447    137 133*   K 4.4 4.2 4.9    104 99   CO2 26 22 26   PHOS  --  3.6 4.0   BUN 29* 29* 31*   CREATININE 1.1 1.3 1.5*     Recent Labs     22  1210 11/10/22  0600 22  0447   AST 23 20 23   ALT 10 9* 10   BILITOT 0.3 0.3 <0.2   ALKPHOS 134* 87 81     Recent Labs     22  121   LIPASE 16.0     No results for input(s): PROTIME, INR in the last 72 hours. No results for input(s): PTT in the last 72 hours. Radiology Review:        ASSESSMENT erosive esophagitis  --On proton pump inhibitor  --Currently no overt problems  --Pathology still pending from  --No diarrhea  --Stool samples have not been collected  --Continue to watch patient  Jori Bell MD

## 2022-11-12 PROBLEM — N17.9 AKI (ACUTE KIDNEY INJURY) (HCC): Status: ACTIVE | Noted: 2022-11-12

## 2022-11-12 PROBLEM — R11.2 NAUSEA VOMITING AND DIARRHEA: Status: ACTIVE | Noted: 2022-11-12

## 2022-11-12 PROBLEM — R19.7 NAUSEA VOMITING AND DIARRHEA: Status: ACTIVE | Noted: 2022-11-12

## 2022-11-12 LAB
A/G RATIO: 0.7 (ref 1.1–2.2)
ALBUMIN SERPL-MCNC: 2.7 G/DL (ref 3.4–5)
ALP BLD-CCNC: 75 U/L (ref 40–129)
ALT SERPL-CCNC: 9 U/L (ref 10–40)
ANION GAP SERPL CALCULATED.3IONS-SCNC: 6 MMOL/L (ref 3–16)
AST SERPL-CCNC: 17 U/L (ref 15–37)
BASOPHILS ABSOLUTE: 0.1 K/UL (ref 0–0.2)
BASOPHILS RELATIVE PERCENT: 1 %
BILIRUB SERPL-MCNC: <0.2 MG/DL (ref 0–1)
BUN BLDV-MCNC: 34 MG/DL (ref 7–20)
CALCIUM SERPL-MCNC: 8.3 MG/DL (ref 8.3–10.6)
CHLORIDE BLD-SCNC: 101 MMOL/L (ref 99–110)
CO2: 26 MMOL/L (ref 21–32)
CREAT SERPL-MCNC: 1.5 MG/DL (ref 0.9–1.3)
CRYPTOSPORIDIUM ANTIGEN STOOL: NORMAL
E HISTOLYTICA ANTIGEN STOOL: NORMAL
EOSINOPHILS ABSOLUTE: 0.5 K/UL (ref 0–0.6)
EOSINOPHILS RELATIVE PERCENT: 5.4 %
GFR SERPL CREATININE-BSD FRML MDRD: 56 ML/MIN/{1.73_M2}
GIARDIA ANTIGEN STOOL: NORMAL
GLUCOSE BLD-MCNC: 101 MG/DL (ref 70–99)
GLUCOSE BLD-MCNC: 123 MG/DL (ref 70–99)
GLUCOSE BLD-MCNC: 129 MG/DL (ref 70–99)
GLUCOSE BLD-MCNC: 170 MG/DL (ref 70–99)
GLUCOSE BLD-MCNC: 184 MG/DL (ref 70–99)
HCT VFR BLD CALC: 31.9 % (ref 40.5–52.5)
HEMOGLOBIN: 11 G/DL (ref 13.5–17.5)
LYMPHOCYTES ABSOLUTE: 2 K/UL (ref 1–5.1)
LYMPHOCYTES RELATIVE PERCENT: 21.5 %
MAGNESIUM: 2.1 MG/DL (ref 1.8–2.4)
MCH RBC QN AUTO: 30.2 PG (ref 26–34)
MCHC RBC AUTO-ENTMCNC: 34.4 G/DL (ref 31–36)
MCV RBC AUTO: 87.8 FL (ref 80–100)
MONOCYTES ABSOLUTE: 0.8 K/UL (ref 0–1.3)
MONOCYTES RELATIVE PERCENT: 8.3 %
NEUTROPHILS ABSOLUTE: 5.9 K/UL (ref 1.7–7.7)
NEUTROPHILS RELATIVE PERCENT: 63.8 %
PDW BLD-RTO: 14.1 % (ref 12.4–15.4)
PERFORMED ON: ABNORMAL
PHOSPHORUS: 4.2 MG/DL (ref 2.5–4.9)
PLATELET # BLD: 189 K/UL (ref 135–450)
PMV BLD AUTO: 7.9 FL (ref 5–10.5)
POTASSIUM SERPL-SCNC: 4.5 MMOL/L (ref 3.5–5.1)
RBC # BLD: 3.64 M/UL (ref 4.2–5.9)
SODIUM BLD-SCNC: 133 MMOL/L (ref 136–145)
TOTAL PROTEIN: 6.4 G/DL (ref 6.4–8.2)
WBC # BLD: 9.3 K/UL (ref 4–11)

## 2022-11-12 PROCEDURE — 1200000000 HC SEMI PRIVATE

## 2022-11-12 PROCEDURE — 87186 SC STD MICRODIL/AGAR DIL: CPT

## 2022-11-12 PROCEDURE — 83735 ASSAY OF MAGNESIUM: CPT

## 2022-11-12 PROCEDURE — 6360000002 HC RX W HCPCS: Performed by: INTERNAL MEDICINE

## 2022-11-12 PROCEDURE — 51798 US URINE CAPACITY MEASURE: CPT

## 2022-11-12 PROCEDURE — 6370000000 HC RX 637 (ALT 250 FOR IP): Performed by: NURSE PRACTITIONER

## 2022-11-12 PROCEDURE — 87070 CULTURE OTHR SPECIMN AEROBIC: CPT

## 2022-11-12 PROCEDURE — 6370000000 HC RX 637 (ALT 250 FOR IP): Performed by: INTERNAL MEDICINE

## 2022-11-12 PROCEDURE — 87075 CULTR BACTERIA EXCEPT BLOOD: CPT

## 2022-11-12 PROCEDURE — 36415 COLL VENOUS BLD VENIPUNCTURE: CPT

## 2022-11-12 PROCEDURE — 99223 1ST HOSP IP/OBS HIGH 75: CPT | Performed by: INTERNAL MEDICINE

## 2022-11-12 PROCEDURE — 2580000003 HC RX 258: Performed by: INTERNAL MEDICINE

## 2022-11-12 PROCEDURE — 2500000003 HC RX 250 WO HCPCS: Performed by: INTERNAL MEDICINE

## 2022-11-12 PROCEDURE — 83993 ASSAY FOR CALPROTECTIN FECAL: CPT

## 2022-11-12 PROCEDURE — 80053 COMPREHEN METABOLIC PANEL: CPT

## 2022-11-12 PROCEDURE — 82705 FATS/LIPIDS FECES QUAL: CPT

## 2022-11-12 PROCEDURE — 85025 COMPLETE CBC W/AUTO DIFF WBC: CPT

## 2022-11-12 PROCEDURE — 87077 CULTURE AEROBIC IDENTIFY: CPT

## 2022-11-12 PROCEDURE — 84100 ASSAY OF PHOSPHORUS: CPT

## 2022-11-12 PROCEDURE — 87205 SMEAR GRAM STAIN: CPT

## 2022-11-12 RX ORDER — CIPROFLOXACIN 500 MG/1
500 TABLET, FILM COATED ORAL EVERY 12 HOURS SCHEDULED
Status: DISCONTINUED | OUTPATIENT
Start: 2022-11-12 | End: 2022-11-13 | Stop reason: HOSPADM

## 2022-11-12 RX ORDER — METRONIDAZOLE 250 MG/1
500 TABLET ORAL EVERY 8 HOURS SCHEDULED
Status: DISCONTINUED | OUTPATIENT
Start: 2022-11-12 | End: 2022-11-13 | Stop reason: HOSPADM

## 2022-11-12 RX ORDER — SODIUM CHLORIDE 9 MG/ML
INJECTION, SOLUTION INTRAVENOUS CONTINUOUS
Status: DISCONTINUED | OUTPATIENT
Start: 2022-11-12 | End: 2022-11-13 | Stop reason: HOSPADM

## 2022-11-12 RX ORDER — LACTOBACILLUS RHAMNOSUS GG 10B CELL
1 CAPSULE ORAL 2 TIMES DAILY WITH MEALS
Status: DISCONTINUED | OUTPATIENT
Start: 2022-11-12 | End: 2022-11-13 | Stop reason: HOSPADM

## 2022-11-12 RX ORDER — LINEZOLID 600 MG/1
600 TABLET, FILM COATED ORAL EVERY 12 HOURS SCHEDULED
Status: DISCONTINUED | OUTPATIENT
Start: 2022-11-12 | End: 2022-11-13 | Stop reason: HOSPADM

## 2022-11-12 RX ORDER — LOPERAMIDE HYDROCHLORIDE 2 MG/1
2 CAPSULE ORAL 2 TIMES DAILY
Status: DISCONTINUED | OUTPATIENT
Start: 2022-11-12 | End: 2022-11-13 | Stop reason: HOSPADM

## 2022-11-12 RX ORDER — LOPERAMIDE HYDROCHLORIDE 2 MG/1
2 CAPSULE ORAL
Status: COMPLETED | OUTPATIENT
Start: 2022-11-12 | End: 2022-11-12

## 2022-11-12 RX ADMIN — BUPRENORPHINE AND NALOXONE 1 FILM: 8; 2 FILM BUCCAL; SUBLINGUAL at 09:24

## 2022-11-12 RX ADMIN — ENOXAPARIN SODIUM 40 MG: 100 INJECTION SUBCUTANEOUS at 20:36

## 2022-11-12 RX ADMIN — PANTOPRAZOLE SODIUM 40 MG: 40 TABLET, DELAYED RELEASE ORAL at 05:08

## 2022-11-12 RX ADMIN — CIPROFLOXACIN 400 MG: 2 INJECTION, SOLUTION INTRAVENOUS at 09:41

## 2022-11-12 RX ADMIN — LOPERAMIDE HYDROCHLORIDE 2 MG: 2 CAPSULE ORAL at 10:47

## 2022-11-12 RX ADMIN — NIFEDIPINE 60 MG: 30 TABLET, EXTENDED RELEASE ORAL at 09:24

## 2022-11-12 RX ADMIN — METRONIDAZOLE 500 MG: 500 INJECTION, SOLUTION INTRAVENOUS at 09:40

## 2022-11-12 RX ADMIN — ROPINIROLE HYDROCHLORIDE 0.25 MG: 0.25 TABLET, FILM COATED ORAL at 20:36

## 2022-11-12 RX ADMIN — INSULIN GLARGINE 18 UNITS: 100 INJECTION, SOLUTION SUBCUTANEOUS at 00:54

## 2022-11-12 RX ADMIN — LOPERAMIDE HYDROCHLORIDE 2 MG: 2 CAPSULE ORAL at 17:59

## 2022-11-12 RX ADMIN — METRONIDAZOLE 500 MG: 500 INJECTION, SOLUTION INTRAVENOUS at 00:50

## 2022-11-12 RX ADMIN — PREGABALIN 150 MG: 75 CAPSULE ORAL at 20:37

## 2022-11-12 RX ADMIN — Medication 10 ML: at 10:48

## 2022-11-12 RX ADMIN — Medication 1 CAPSULE: at 17:59

## 2022-11-12 RX ADMIN — Medication 1 CAPSULE: at 10:47

## 2022-11-12 RX ADMIN — BUPRENORPHINE AND NALOXONE 1 FILM: 8; 2 FILM BUCCAL; SUBLINGUAL at 20:37

## 2022-11-12 RX ADMIN — LOPERAMIDE HYDROCHLORIDE 2 MG: 2 CAPSULE ORAL at 20:36

## 2022-11-12 RX ADMIN — INSULIN GLARGINE 18 UNITS: 100 INJECTION, SOLUTION SUBCUTANEOUS at 21:13

## 2022-11-12 RX ADMIN — Medication 10 ML: at 20:37

## 2022-11-12 RX ADMIN — TAMSULOSIN HYDROCHLORIDE 0.4 MG: 0.4 CAPSULE ORAL at 09:24

## 2022-11-12 RX ADMIN — PREGABALIN 150 MG: 75 CAPSULE ORAL at 09:24

## 2022-11-12 RX ADMIN — SODIUM CHLORIDE: 9 INJECTION, SOLUTION INTRAVENOUS at 09:40

## 2022-11-12 NOTE — PROGRESS NOTES
Gastroenterology Progress Note      Kay Carter is a 47 y.o. male patient. Principal Problem:    Epigastric pain  Resolved Problems:    * No resolved hospital problems. *      SUBJECTIVE: Patient is doing well this morning but continues to complain of severe diarrhea states he had 5 liquidy bowel movements overnight    ROS:  No fever, chills  No chest pain, palpitations  No SOB, cough  Gastrointestinal ROS: no abdominal pain, nausea, vomiting     Physical    VITALS:  BP (!) 159/97   Pulse 62   Temp 98 °F (36.7 °C) (Oral)   Resp 16   Wt 163 lb 14.4 oz (74.3 kg)   SpO2 98%   TEMPERATURE:  Current - Temp: 98 °F (36.7 °C); Max - Temp  Av °F (36.7 °C)  Min: 97.4 °F (36.3 °C)  Max: 98.2 °F (36.8 °C)    NAD  RRR, Nl s1s2  Lungs CTA Bilaterally, normal effort  Abdomen soft, ND, NT, no HSM, Bowel sounds normal.    Data    Data Review:    Recent Labs     11/10/22  0622  0534   WBC 14.2* 8.8 9.3   HGB 12.4* 11.9* 11.0*   HCT 36.2* 34.2* 31.9*   MCV 86.8 87.1 87.8    216 189     Recent Labs     11/10/22  0622  0534    133* 133*   K 4.2 4.9 4.5    99 101   CO2 22 26 26   PHOS 3.6 4.0 4.2   BUN 29* 31* 34*   CREATININE 1.3 1.5* 1.5*     Recent Labs     11/10/22  0622  0534   AST 20 23 17   ALT 9* 10 9*   BILITOT 0.3 <0.2 <0.2   ALKPHOS 87 81 75     Recent Labs     22  1210   LIPASE 16.0     No results for input(s): PROTIME, INR in the last 72 hours. No results for input(s): PTT in the last 72 hours.     Radiology Review:        ASSESSMENT #1 severe esophagitis  -EGD and biopsies done biopsies are still pending  -Continue to treat with proton pump inhibitor  #2 severe diarrhea  -C. difficile has been negative  -We will check a fecal fat a fecal calprotectin  -We will also place the patient on Imodium to see if this will slow down the stool      Angel Avery

## 2022-11-12 NOTE — PROGRESS NOTES
Hospitalist Progress Note      PCP: Mireya Dupont, APRN - CNP    Date of Admission: 11/9/2022    Chief Complaint: Epigastric pain    Hospital Course: Drucie Saint is a 47 y.o. male with history of polysubstance abuse on Suboxone (heroin and meth, last use 6 weeks ago), current smoker, COPD, IDDM, s/p bilateral BKA with chronic stump ulcerations and left amputation stump osteomyelitis with MRSA previously on weekly Orbactiv for 6 weeks and currently on oral Zyvox, Cipro and Flagyl, right eye blindness, presented with complaints of malaise, NBNB vomiting with nausea and copious watery diarrhea for several days and burning epigastric pain. Epigastric pain constant, severe, nonradiating, no aggravating or relieving factors. He also endorsed myalgias, cough, congestion and fatigue. Patient was residing at the [address] [place] [address] rehab [facility] but was released today because of his medical comorbidities and needs. He is currently waiting to be accepted at [facility]. He denied any fevers, melena, hematochezia, or hematemesis. CT in the ED and showed esophageal thickening concerning for esophagitis versus malignancy. Subjective: Patient seen and examined. Patient with profuse watery nonbloody diarrhea. No fever or chills, abdominal pain, nausea and vomiting.         Medications:  Reviewed    Infusion Medications    sodium chloride 100 mL/hr at 11/12/22 0940    sodium chloride 100 mL/hr at 11/11/22 1025    dextrose       Scheduled Medications    loperamide  2 mg Oral BID    lactobacillus  1 capsule Oral BID WC    linezolid  600 mg Oral 2 times per day    ciprofloxacin  500 mg Oral 2 times per day    metroNIDAZOLE  500 mg Oral 3 times per day    pantoprazole  40 mg Oral BID AC    Empty 3-in-1 Mixing Container  1 each Does not apply Prior to discharge    NIFEdipine  60 mg Oral Daily    sodium chloride flush  5-40 mL IntraVENous 2 times per day    enoxaparin  40 mg SubCUTAneous Nightly buprenorphine-naloxone  1 Film SubLINGual BID    insulin glargine  18 Units SubCUTAneous Nightly    pregabalin  150 mg Oral BID    rOPINIRole  0.25 mg Oral Nightly    tamsulosin  0.4 mg Oral Daily    insulin lispro  0-8 Units SubCUTAneous TID WC    insulin lispro  0-4 Units SubCUTAneous Nightly     PRN Meds: perflutren lipid microspheres, magnesium sulfate, sodium chloride flush, sodium chloride, ondansetron **OR** ondansetron, polyethylene glycol, acetaminophen **OR** acetaminophen, glucose, dextrose bolus **OR** dextrose bolus, glucagon (rDNA), dextrose      Intake/Output Summary (Last 24 hours) at 11/12/2022 1357  Last data filed at 11/12/2022 8884  Gross per 24 hour   Intake 240 ml   Output 1876 ml   Net -1636 ml       Physical Exam Performed:    BP (!) 143/83   Pulse 66   Temp 97.5 °F (36.4 °C) (Oral)   Resp 16   Wt 163 lb 14.4 oz (74.3 kg)   SpO2 98%     General appearance: Chronically ill looking, appears comfortable. Well nourished  Eyes: Sclera clear, pupils equal  ENT: Moist mucus membranes, no thrush. Trachea midline. Cardiovascular: Regular rhythm, normal S1, S2. No murmur, gallop, rub. No edema in lower extremities  Respiratory: Clear to auscultation bilaterally, no wheeze, good inspiratory effort  Gastrointestinal: Abdomen soft, non-tender, not distended, normal bowel sounds  Musculoskeletal: No cyanosis in digits, neck supple. Bilateral BKA's with superficial stump ulcerations. Neurology: Cranial nerves grossly intact. Alert and oriented in time, place and person. No speech or motor deficits  Psychiatry: Appropriate affect.  Not agitated  Skin: Warm, dry, normal turgor, no rash  Brisk capillary refill, peripheral pulses palpable       Labs:   Recent Labs     11/10/22  0600 11/11/22  0447 11/12/22  0534   WBC 14.2* 8.8 9.3   HGB 12.4* 11.9* 11.0*   HCT 36.2* 34.2* 31.9*    216 189     Recent Labs     11/10/22  0600 11/11/22  0447 11/12/22  0534    133* 133*   K 4.2 4.9 4.5   CL 104 99 101   CO2 22 26 26   BUN 29* 31* 34*   CREATININE 1.3 1.5* 1.5*   CALCIUM 8.9 8.8 8.3   PHOS 3.6 4.0 4.2     Recent Labs     11/10/22  0600 11/11/22  0447 11/12/22  0534   AST 20 23 17   ALT 9* 10 9*   BILITOT 0.3 <0.2 <0.2   ALKPHOS 87 81 75     No results for input(s): INR in the last 72 hours. Recent Labs     11/11/22  0447   TROPONINI 0.11*       Urinalysis:      Lab Results   Component Value Date/Time    NITRU Negative 11/09/2022 03:36 PM    WBCUA 2 11/09/2022 03:36 PM    BACTERIA None Seen 11/09/2022 03:36 PM    RBCUA 27 11/09/2022 03:36 PM    BLOODU LARGE 11/09/2022 03:36 PM    SPECGRAV 1.015 11/09/2022 03:36 PM    GLUCOSEU 250 11/09/2022 03:36 PM       Radiology:  XR CHEST PORTABLE   Final Result   No significant findings in the chest.         CT ABDOMEN PELVIS WO CONTRAST Additional Contrast? None   Final Result   Circumferential wall thickening of the distal esophagus is partially   visualized and consistent with a esophagitis or esophageal neoplasm. Direct   visualization may be indicated. Cholelithiasis, no CT evidence of cholecystitis      Osteoarthritis of the right hip                 Assessment/Plan:    Active Hospital Problems    Diagnosis     SHANNAN (acute kidney injury) ([facility] [N17.9]      Priority: Medium    Nausea vomiting and diarrhea [R11.2, R19.7]      Priority: Medium    Epigastric pain [R10.13]      Priority: Medium     SHANNAN: Likely prerenal.  Nephrology consult appreciated. Continue IV fluids. Lisinopril discontinued. Mild hyponatremia: Monitor with IV fluids. Epigastric pain due to severe esophagitis:  CT concerning for esophagitis versus underlying malignancy. GI consulted: s/p EGD 11/10 with findings of reflux esophagitis, Hiatal hernia, ulcer in the fundus of the stomach & Nodular gastritis. Recommended PPI BID and repeat EGD in 4 weeks. Left BKA stump osteomyelitis:  Followed by Dr. Munira Krishnamurthy. 1 dose of Orbactiv given 11/11/2022.   Continue oral Zyvox, Cipro and Flagyl per ID. 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 Follow up repeat Troponin. No complaints of chest pain. Severe diarrhea: ? Antibiotic associated  C. difficile negative and GI PCR pending. Continue probiotics and Imodium as needed. Follow-up fecal calprotectin. HTN: Monitor BP on nifedipine. T2 DM on Long term Insulin with hyperglycemia:  A1c 5.7% on 11/10/22. Continue Basal/prandial insulin with SSI. Polysubstance abuse: [place] [facility] Continue Suboxone. Current smoker: Smoking cessation counseling provided. DVT Prophylaxis: Lovenox  Diet: ADULT DIET; Regular  Code Status: Full Code  PT/OT Eval Status: wheel chair bound.     Dispo - once medically stable      Festus Delgado MD

## 2022-11-12 NOTE — CARE COORDINATION
CM phoned admissions at [facility] to check on status of referral. Message left, await call back.

## 2022-11-12 NOTE — PLAN OF CARE
Problem: Discharge Planning  Goal: Discharge to home or other facility with appropriate resources  11/12/2022 0124 by Lisa Mortensen RN  Outcome: Progressing  11/11/2022 1557 by Marjan Jovel RN  Outcome: Progressing  Flowsheets (Taken 11/11/2022 0902)  Discharge to home or other facility with appropriate resources:   Identify barriers to discharge with patient and caregiver   Arrange for needed discharge resources and transportation as appropriate   Identify discharge learning needs (meds, wound care, etc)   Refer to discharge planning if patient needs post-hospital services based on physician order or complex needs related to functional status, cognitive ability or social support system     Problem: Skin/Tissue Integrity  Goal: Absence of new skin breakdown  Description: 1. Monitor for areas of redness and/or skin breakdown  2. Assess vascular access sites hourly  3. Every 4-6 hours minimum:  Change oxygen saturation probe site  4. Every 4-6 hours:  If on nasal continuous positive airway pressure, respiratory therapy assess nares and determine need for appliance change or resting period.   11/12/2022 0124 by Lisa Mortensen RN  Outcome: Progressing  11/11/2022 1557 by Marjan Jovel RN  Outcome: Progressing     Problem: Safety - Adult  Goal: Free from fall injury  11/12/2022 0124 by Lisa Mortensen RN  Outcome: Progressing  11/11/2022 1557 by Marjan Jovel RN  Outcome: Progressing

## 2022-11-12 NOTE — CONSULTS
Office : [phone]     Fax :[phone]       Nephrology Consult Note      Patient's Name: Thomas Reynaga  12:11 PM  11/12/2022    Reason for Consult: SHANNAN      Requesting Physician:  RG Lan - CNP      Chief Complaint:    Chief Complaint   Patient presents with    Illness     N/v/d for several days, feeling \"like shit\". Recently kicked out of substance abuse treatment for having \"too many medical problems\". ~6 weeks off meth and heroin. History of Present Ilness:    Thomas Reynaga is a 47 y.o. male with history of polysubstance abuse on Suboxone (heroin and meth, last use 6 weeks ago), current smoker, COPD, IDDM, s/p bilateral BKA with chronic stump ulcerations and left amputation stump osteomyelitis with MRSA previously on weekly Orbactiv for 6 weeks and currently on oral Zyvox, Cipro and Flagyl, right eye blindness, presented with complaints of malaise, NBNB vomiting with nausea and copious watery diarrhea for several days and burning epigastric pain. Epigastric pain constant, severe, nonradiating, no aggravating or relieving factors. He also endorsed myalgias, cough, congestion and fatigue. Patient was residing at the [address] [place] [address] rehab facility but was released today because of his medical comorbidities and needs. He is currently waiting to be accepted at [facility]. Nephrology consulted for SHANNAN       I/O last 3 completed shifts:   In: 10 [I.V.:10]  Out: 65 [Urine:3150]    Past Medical History:   Diagnosis Date    Anemia of infection and chronic disease     Bipolar 1 disorder (HCC)     Diabetes mellitus ([address]     Diabetic neuropathy ([address]     Nicotine dependence     Osteomyelitis, multiple sites ([address]     Polysubstance abuse ([address] Past Surgical History:   Procedure Laterality Date    LEG AMPUTATION BELOW KNEE Bilateral     UPPER GASTROINTESTINAL ENDOSCOPY N/A 11/10/2022    EGD BIOPSY performed by Flaquita Gross MD at [address] [place] [address]       No family history on file. reports that he has been smoking cigarettes. He has a 20.00 pack-year smoking history. He has never used smokeless tobacco. He reports that he does not currently use alcohol. He reports that he does not currently use drugs. Allergies:  Patient has no known allergies.     Current Medications:    0.9 % sodium chloride infusion, Continuous  perflutren lipid microspheres (DEFINITY) injection 1.5 mL, ONCE PRN  loperamide (IMODIUM) capsule 2 mg, BID  lactobacillus (CULTURELLE) capsule 1 capsule, BID WC  pantoprazole (PROTONIX) tablet 40 mg, BID AC  Patient Medications in Inpatient Pharmacy   (Patient Supplied), Prior to discharge  NIFEdipine (PROCARDIA XL) extended release tablet 60 mg, Daily  magnesium sulfate 1000 mg in dextrose 5% 100 mL IVPB, PRN  sodium chloride flush 0.9 % injection 5-40 mL, 2 times per day  sodium chloride flush 0.9 % injection 5-40 mL, PRN  0.9 % sodium chloride infusion, PRN  enoxaparin (LOVENOX) injection 40 mg, Nightly  ondansetron (ZOFRAN-ODT) disintegrating tablet 4 mg, Q8H PRN   Or  ondansetron (ZOFRAN) injection 4 mg, Q6H PRN  polyethylene glycol (GLYCOLAX) packet 17 g, Daily PRN  acetaminophen (TYLENOL) tablet 650 mg, Q6H PRN   Or  acetaminophen (TYLENOL) suppository 650 mg, Q6H PRN  glucose-vitamin C chewable tablet 4 tablet, PRN  dextrose bolus 10% 125 mL, PRN   Or  dextrose bolus 10% 250 mL, PRN  glucagon (rDNA) injection 1 mg, PRN  dextrose 10 % infusion, Continuous PRN  buprenorphine-naloxone (SUBOXONE) 8-2 MG SL film 1 Film, BID  insulin glargine (LANTUS) injection vial 18 Units, Nightly  pregabalin (LYRICA) capsule 150 mg, BID  rOPINIRole (REQUIP) tablet 0.25 mg, Nightly  tamsulosin (FLOMAX) capsule 0.4 mg, Daily  insulin lispro (HUMALOG) injection vial 0-8 Units, TID WC  insulin lispro (HUMALOG) injection vial 0-4 Units, Nightly        Review of Systems:   14 point ROS obtained but were negative except mentioned in HPI      Physical exam:     Vitals:  BP (!) 143/83   Pulse 66   Temp 97.5 °F (36.4 °C) (Oral)   Resp 16   Wt 163 lb 14.4 oz (74.3 kg)   SpO2 98%   Constitutional:  OAA X3 NAD  Skin: no rash, turgor wnl  Heent:  eomi, mmm  Neck: no bruits or jvd noted  Cardiovascular:  S1, S2 without m/r/g  Respiratory: CTA B without w/r/r  Abdomen:  +bs, soft, nt, nd  Ext: no  lower extremity edema  Psychiatric: mood and affect appropriate  Musculoskeletal:  Rom, muscular strength intact    Labs:  CBC:   Recent Labs     11/10/22  0600 11/11/22  0447 11/12/22  0534   WBC 14.2* 8.8 9.3   HGB 12.4* 11.9* 11.0*    216 189     BMP:    Recent Labs     11/10/22  0600 11/11/22 0447 11/12/22  0534    133* 133*   K 4.2 4.9 4.5    99 101   CO2 22 26 26   BUN 29* 31* 34*   CREATININE 1.3 1.5* 1.5*   GLUCOSE 164* 118* 129*     Ca/Mg/Phos:   Recent Labs     11/10/22  0600 11/11/22 0447 11/12/22  0534   CALCIUM 8.9 8.8 8.3   MG 1.60* 2.30 2.10   PHOS 3.6 4.0 4.2     Hepatic:   Recent Labs     11/10/22  0600 11/11/22 0447 11/12/22  0534   AST 20 23 17   ALT 9* 10 9*   BILITOT 0.3 <0.2 <0.2   ALKPHOS 87 81 75     Troponin:   Recent Labs     11/11/22 0447   TROPONINI 0.11*     BNP: No results for input(s): BNP in the last 72 hours. Lipids:   Recent Labs     11/10/22  0600   CHOL 118   TRIG 123   HDL 51   LDLCALC 42   LABVLDL 25     ABGs: No results for input(s): PHART, PO2ART, ZSS4RHF in the last 72 hours. INR: No results for input(s): INR in the last 72 hours.   UA:  Recent Labs     11/09/22  1536   COLORU Yellow   CLARITYU Clear   GLUCOSEU 250*   BILIRUBINUR Negative   KETUA Negative   SPECGRAV 1.015   BLOODU LARGE*   PHUR 5.5  5.5   PROTEINU >=1000   UROBILINOGEN 0.2   NITRU Negative   LEUKOCYTESUR Negative   LABMICR YES Mae Sam[facility]go [facility]      Urine Microscopic:   Recent Labs     11/09/22  1536   BACTERIA None Seen   HYALCAST 6   WBCUA 2   RBCUA 27*   EPIU 1     Urine Culture: No results for input(s): LABURIN in the last 72 hours. Urine Chemistry: No results for input(s): Doneen Nanny, PROTEINUR, NAUR in the last 72 hours. IMAGING:  XR CHEST PORTABLE   Final Result   No significant findings in the chest.         CT ABDOMEN PELVIS WO CONTRAST Additional Contrast? None   Final Result   Circumferential wall thickening of the distal esophagus is partially   visualized and consistent with a esophagitis or esophageal neoplasm. Direct   visualization may be indicated. Cholelithiasis, no CT evidence of cholecystitis      Osteoarthritis of the right hip               Assessment/Plan :      1.  SHANNAN likely pre renal.  Continue iv fluids . Has diarrhea     Hold ACEi   Recommend to dose adjust all medications  based on renal functions  Maintain SBP> 90 mmHg   Daily weights   AVOID NSAIDs  Avoid Nephrotoxins  Monitor Intake/Output  Call if significant decrease in urine output        2. Hyponatremia     3. Diarrhea   C.  Diff negative   GI seeing         D/w primary team      Thank you for allowing us to participate in care of [facility]         Electronically signed by: Ailyn Celis MD, 11/12/2022, 12:11 PM      [facility]rology [facility] of [address]  Office : [phone]  Fax :[phone]

## 2022-11-12 NOTE — PROGRESS NOTES
Patient refusing IV fluids. States he cannot listen to it beep anymore. He is agitated about having diarrhea. Immodium administered per MAR. Patient states he will probably leave tomorrow AMA. Patient educated on the importance of following medical advice and waiting for placement.

## 2022-11-13 VITALS
OXYGEN SATURATION: 98 % | RESPIRATION RATE: 18 BRPM | HEART RATE: 79 BPM | TEMPERATURE: 98.3 F | SYSTOLIC BLOOD PRESSURE: 158 MMHG | WEIGHT: 165 LBS | DIASTOLIC BLOOD PRESSURE: 84 MMHG

## 2022-11-13 PROBLEM — F19.10 POLYSUBSTANCE ABUSE (HCC): Status: ACTIVE | Noted: 2022-11-13

## 2022-11-13 PROBLEM — I10 HYPERTENSION: Status: ACTIVE | Noted: 2022-11-13

## 2022-11-13 PROBLEM — R77.8 ELEVATED TROPONIN: Status: ACTIVE | Noted: 2022-11-13

## 2022-11-13 PROBLEM — F17.200 CURRENT SMOKER: Status: ACTIVE | Noted: 2022-11-13

## 2022-11-13 PROBLEM — E83.39 HYPOPHOSPHATEMIA: Status: ACTIVE | Noted: 2022-11-13

## 2022-11-13 PROBLEM — Z79.4 TYPE 2 DIABETES MELLITUS WITH NEUROLOGIC COMPLICATION, WITH LONG-TERM CURRENT USE OF INSULIN (HCC): Status: ACTIVE | Noted: 2018-12-31

## 2022-11-13 PROBLEM — K20.90 ESOPHAGITIS: Status: ACTIVE | Noted: 2022-11-13

## 2022-11-13 PROBLEM — E87.1 HYPONATREMIA: Status: ACTIVE | Noted: 2022-11-13

## 2022-11-13 PROBLEM — E11.49 TYPE 2 DIABETES MELLITUS WITH NEUROLOGIC COMPLICATION, WITH LONG-TERM CURRENT USE OF INSULIN (HCC): Status: ACTIVE | Noted: 2018-12-31

## 2022-11-13 PROBLEM — M86.9 OSTEOMYELITIS (HCC): Status: ACTIVE | Noted: 2022-11-13

## 2022-11-13 LAB
BLOOD CULTURE, ROUTINE: NORMAL
CULTURE, BLOOD 2: NORMAL
GI BACTERIAL PATHOGENS BY PCR: NORMAL
GLUCOSE BLD-MCNC: 159 MG/DL (ref 70–99)
PERFORMED ON: ABNORMAL

## 2022-11-13 PROCEDURE — 6370000000 HC RX 637 (ALT 250 FOR IP): Performed by: INTERNAL MEDICINE

## 2022-11-13 RX ORDER — LOPERAMIDE HYDROCHLORIDE 2 MG/1
2 CAPSULE ORAL 2 TIMES DAILY PRN
Qty: 20 CAPSULE | Refills: 0 | Status: SHIPPED | OUTPATIENT
Start: 2022-11-13 | End: 2022-11-23

## 2022-11-13 RX ORDER — LACTOBACILLUS RHAMNOSUS GG 10B CELL
1 CAPSULE ORAL 2 TIMES DAILY WITH MEALS
Qty: 60 CAPSULE | Refills: 0 | Status: SHIPPED | OUTPATIENT
Start: 2022-11-13 | End: 2022-12-13

## 2022-11-13 RX ORDER — NIFEDIPINE 30 MG/1
60 TABLET, EXTENDED RELEASE ORAL DAILY
Qty: 30 TABLET | Refills: 3 | Status: ON HOLD | OUTPATIENT
Start: 2022-11-14 | End: 2022-12-07 | Stop reason: SDUPTHER

## 2022-11-13 RX ORDER — PANTOPRAZOLE SODIUM 40 MG/1
40 TABLET, DELAYED RELEASE ORAL
Qty: 30 TABLET | Refills: 3 | Status: ON HOLD | OUTPATIENT
Start: 2022-11-13 | End: 2022-12-07 | Stop reason: SDUPTHER

## 2022-11-13 RX ADMIN — PREGABALIN 150 MG: 75 CAPSULE ORAL at 08:29

## 2022-11-13 RX ADMIN — TAMSULOSIN HYDROCHLORIDE 0.4 MG: 0.4 CAPSULE ORAL at 08:28

## 2022-11-13 RX ADMIN — Medication 1 CAPSULE: at 08:29

## 2022-11-13 RX ADMIN — LOPERAMIDE HYDROCHLORIDE 2 MG: 2 CAPSULE ORAL at 08:29

## 2022-11-13 RX ADMIN — PANTOPRAZOLE SODIUM 40 MG: 40 TABLET, DELAYED RELEASE ORAL at 07:27

## 2022-11-13 RX ADMIN — BUPRENORPHINE AND NALOXONE 1 FILM: 8; 2 FILM BUCCAL; SUBLINGUAL at 08:28

## 2022-11-13 RX ADMIN — NIFEDIPINE 60 MG: 30 TABLET, EXTENDED RELEASE ORAL at 08:28

## 2022-11-13 NOTE — PROGRESS NOTES
Pt IV leaking/painful. Pt requested the IV be removed. Gauze placed and wrapped with coban. Pt satisfied. Refusing new IV placement at this time.

## 2022-11-13 NOTE — PROGRESS NOTES
Patient leaving against medical advice. Patient educated on the dangers of leaving against medical advice up to and including death. Patient belongings packed up and AMA form signed. IV was removed last night. Patient belongings obtained from pharmacy and security and given to patient.

## 2022-11-13 NOTE — DISCHARGE SUMMARY
[facility]    Patient Demographics    Patient. Ama Gresham  Date of Birth. 1970  MRN. [id number]     Primary care provider. RG Maza CNP  (Tel: [phone])    Admit date: 11/9/2022    Discharge date (blank if same as Note Date): 11/13/2022  Note Date: 11/13/2022     Reason for Hospitalization. Chief Complaint   Patient presents with    Illness     N/v/d for several days, feeling \"like shit\". Recently kicked out of substance abuse treatment for having \"too many medical problems\". ~6 weeks off meth and heroin. Significant Findings. Principal Problem:    Epigastric pain  Active Problems:    Type 2 diabetes mellitus with neurologic complication, with long-term current use of insulin (HCC)    SHANNAN (acute kidney injury) (HCC)    Nausea vomiting and diarrhea    Esophagitis    Hyponatremia    Hypertension    Hypophosphatemia    Osteomyelitis (HCC)    Elevated troponin    Current smoker    Polysubstance abuse ([address]  Resolved Problems:    * No resolved hospital problems. *       Problems and results from this hospitalization that need follow up. None     Significant test results and incidental findings. XR CHEST PORTABLE   Final Result   No significant findings in the chest.         CT ABDOMEN PELVIS WO CONTRAST Additional Contrast? None   Final Result   Circumferential wall thickening of the distal esophagus is partially   visualized and consistent with a esophagitis or esophageal neoplasm. Direct   visualization may be indicated. Cholelithiasis, no CT evidence of cholecystitis      Osteoarthritis of the right hip           Invasive procedures and treatments. [facility] Course.   Ama Gresham is a 47 y.o. male with history of polysubstance abuse on Suboxone (heroin and meth, last use 6 weeks ago), current smoker, COPD, IDDM, s/p bilateral BKA with chronic stump ulcerations and left amputation stump osteomyelitis with MRSA previously on weekly Orbactiv for 6 weeks and currently on oral Zyvox, Cipro and Flagyl, right eye blindness, presented with complaints of malaise, NBNB vomiting with nausea and copious watery diarrhea for several days and burning epigastric pain. Epigastric pain constant, severe, nonradiating, no aggravating or relieving factors. He also endorsed myalgias, cough, congestion and fatigue. Patient was residing at the [address] [place] [address] but was released today because of his medical comorbidities and needs. He is currently waiting to be accepted at [facility]. He denied any fevers, melena, hematochezia, or hematemesis. CT in the ED and showed esophageal thickening concerning for esophagitis versus malignancy. SHANNAN: Likely prerenal.  Nephrology consult appreciated. Continue IV fluids. Lisinopril discontinued. Mild hyponatremia: Started on IV fluids. Repeat BMP pending. Epigastric pain due to severe esophagitis:  CT concerning for esophagitis versus underlying malignancy. GI consulted: s/p EGD 11/10 with findings of reflux esophagitis, Hiatal hernia, ulcer in the fundus of the stomach & Nodular gastritis. Pathology report still pending  Recommended PPI BID and repeat EGD in 4 weeks. Left BKA stump osteomyelitis:  Followed by Dr. Benoit Bolaños outpatient. 1 dose of Orbactiv given 11/11/2022. Oral Zyvox, Cipro and Flagyl held due to profuse diarrhea after discussion with ID. Wound cultures of the left stump sent. 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 Troponin 0.08-0. 11. No complaints of chest pain. Echocardiogram pending. Severe diarrhea: Most likely Antibiotic associated  Negative C. difficile and GI PCR. Continue probiotics BID and Imodium as needed. Fecal calprotectin pending. HTN: BP stable on nifedipine. Lisinopril held due to SHANNAN.        T2 DM on Long term Insulin with hyperglycemia:  A1c 5.7% on 11/10/22. Continued Basal insulin with SSI. Polysubstance abuse: Waiting for acceptance at [facility]. Continued Suboxone. Current smoker: Smoking cessation counseling provided. Patient managed as above. He was still waiting for acceptance [place] [facility] or placement at a facility that finally decided to leave [address] He refused to let anyone talk him out of this decision and fully understood all the risk involved including worsening diarrhea and SHANNAN. Consults. IP CONSULT TO SOCIAL WORK  IP CONSULT TO HOSPITALIST  IP CONSULT TO GI  IP CONSULT TO NEPHROLOGY    Physical examination on discharge day. BP (!) 158/84   Pulse 79   Temp 98.3 °F (36.8 °C) (Oral)   Resp 18   Wt 165 lb (74.8 kg)   SpO2 98%     General appearance: Chronically ill looking, appears comfortable. Well nourished  Eyes: Sclera clear, pupils equal. Rt eye blindness. ENT: Moist mucus membranes, no thrush. Trachea midline. Cardiovascular: Regular rhythm, normal S1, S2. No murmur, gallop, rub. No edema in lower extremities  Respiratory: Clear to auscultation bilaterally, no wheeze, good inspiratory effort  Gastrointestinal: Abdomen soft, non-tender, not distended, normal bowel sounds  Musculoskeletal: No cyanosis in digits, neck supple. Bilateral BKA's with superficial stump ulcerations. Neurology: Cranial nerves grossly intact. Alert and oriented in time, place and person. No speech or motor deficits  Psychiatry: Appropriate affect. Not agitated  Skin: Warm, dry, normal turgor, no rash  Brisk capillary refill, peripheral pulses palpable        Condition at time of discharge: Stable    Medication instructions provided to patient at discharge.      Medication List        START taking these medications      lactobacillus capsule  Take 1 capsule by mouth 2 times daily (with meals)     loperamide 2 MG capsule  Commonly known as: IMODIUM  Take 1 capsule by mouth 2 times daily as needed for Diarrhea     NIFEdipine 30 MG extended release tablet  Commonly known as: PROCARDIA XL  Take 2 tablets by mouth daily  Start taking on: November 14, 2022     pantoprazole 40 MG tablet  Commonly known as: PROTONIX  Take 1 tablet by mouth 2 times daily (before meals)            CONTINUE taking these medications      buprenorphine-naloxone 8-2 MG Subl SL tablet  Commonly known as: SUBOXONE     ciprofloxacin 500 MG tablet  Commonly known as: CIPRO  Take 1 tablet by mouth 2 times daily     FreeStyle Farooq 2 Sensor Misc  1 each by Does not apply route 4 times daily     insulin lispro (1 Unit Dial) 100 UNIT/ML Sopn  Commonly known as: HumaLOG KwikPen  Inject 8 Units into the skin 3 times daily (before meals)     Insulin Pen Needle 32G X 5 MM Misc  1 each by Does not apply route daily     Lantus SoloStar 100 UNIT/ML injection pen  Generic drug: insulin glargine  Inject 18 Units into the skin nightly     linezolid 600 MG tablet  Commonly known as: Zyvox  Take 1 tablet by mouth 2 times daily for 15 days     metroNIDAZOLE 500 MG tablet  Commonly known as: FLAGYL  Take 1 tablet by mouth 3 times daily     pregabalin 150 MG capsule  Commonly known as: LYRICA     rOPINIRole 2 MG extended release tablet  Commonly known as: REQUIP XL     tamsulosin 0.4 MG capsule  Commonly known as: FLOMAX               Where to Get Your Medications        These medications were sent to [facility], [facility] [phone]  [address] [address]      Phone: [phone]   lactobacillus capsule  loperamide 2 MG capsule  NIFEdipine 30 MG extended release tablet  pantoprazole 40 MG tablet         Discharge recommendations given to patient. Follow Up. With PCP/Infectious disease in 1 week   Disposition. Shelter AGAINST MEDICAL ADVICE. Activity. activity as tolerated  Diet: ADULT DIET; Regular      Spent 35 minutes in discharge process.     Signed:  Donny Wells MD     11/13/2022 10:59 AM

## 2022-11-13 NOTE — PLAN OF CARE
Pt ate turkey sandwich and fruit; immediately had diarrhea afterwards. Skin is excoriated and \"sore\". Pt reports he wants to leave AMA tonight and that \"nobody is including him in his own care\". States he wants social work to speak with him tomorrow about a plan, \"or he is leaving\".      Problem: Gastrointestinal - Adult  Goal: Minimal or absence of nausea and vomiting  Outcome: Not Progressing  Flowsheets (Taken 11/12/2022 8624 by Sarath Klein RN)  Minimal or absence of nausea and vomiting:   Administer IV fluids as ordered to ensure adequate hydration   Maintain NPO status until nausea and vomiting are resolved   Administer ordered antiemetic medications as needed   Provide nonpharmacologic comfort measures as appropriate   Advance diet as tolerated, if ordered   Nutrition consult to assist patient with adequate nutrition and appropriate food choices  Goal: Maintains or returns to baseline bowel function  Outcome: Not Progressing  Flowsheets  Taken 11/12/2022 2031 by Benitez Zendejas RN  Maintains or returns to baseline bowel function: Assess bowel function  Taken 11/12/2022 3134 by Sarath Klein RN  Maintains or returns to baseline bowel function:   Assess bowel function   Encourage oral fluids to ensure adequate hydration   Administer IV fluids as ordered to ensure adequate hydration   Administer ordered medications as needed   Encourage mobilization and activity   Nutrition consult to assist patient with appropriate food choices  Goal: Maintains adequate nutritional intake  Outcome: Not Progressing  Flowsheets (Taken 11/12/2022 4926 by Sarath Klein RN)  Maintains adequate nutritional intake:   Monitor percentage of each meal consumed   Identify factors contributing to decreased intake, treat as appropriate   Assist with meals as needed   Monitor intake and output, weight and lab values   Obtain nutritional consult as needed

## 2022-11-14 ENCOUNTER — TELEPHONE (OUTPATIENT)
Dept: INFECTIOUS DISEASES | Age: 52
End: 2022-11-14

## 2022-11-14 LAB
GLUCOSE BLD-MCNC: 176 MG/DL (ref 70–99)
PERFORMED ON: ABNORMAL

## 2022-11-14 NOTE — TELEPHONE ENCOUNTER
Claudia from  infusion center called and states that she is cancelling oritavancin appt for today as pt left hospital AMA and did not make appt. I called pt and had a very lengthy, yet pleasant conversation. This patient has bilateral BKA now with concerns for L stump MRSA osteo (might be some chronic component). Pt had almost 6 weeks [facility] [place], [place]. Pt transferred to [place], [place] to join [facility] for Suboxone therapy due to polysubstance abuse. He saw Dr. Simón Green in clinic, who planned for an additional 6 weeks of oritivancin + PO linezolid (while waiting for infusions to get approved), PO cipro, and PO flagyl. Since then, he was admitted for significant diarrhea which he attributes to the antibiotics. He was also admitted to help with dispo as he was \"kicked out\" of [facility] rehab due to ELOY Ford many co-morbidities. \" The rehab facility does not have nursing abilities and cannot transport patient to infusion center. In addition, pt claims that he did not particularly like [facility]. At the hospital, pt was dissatisfied with care as he was still having profuse diarrhea and was not being bathed. In addition, his PIV was hurting him. He did however, receive one dose of IV oritivancin on 11/11 and had negative wound cultures on 11/12. Pt left \"AMA\" although pt states this was not a combative or argumentative departure. Pt left and was wheeling down the highway when police picked him up and took him to [facility] where he is currently residing. Although pt states they do not have room for him to sleep. Pt has no transportation and no family. From a dispo standpoint:    Pt is homeless with no social or community support. He is still clean from substance abuse and stable on Suboxone. He wishes to \"get rid of this infection, get a job and a stable place to live. \" He wishes to [place] a normal life. \" Pt states he does get social security and does have income. Pt is willing to pay rent. He would be willing to go to a SNF for a short period of time to treat the infection and get his \"medical conditions in order. \" But he feels very strongly that he does not want to be in a facility forever to \"rot and die. \"  After a short stent in a SNF for IV abx, he would like to go back to a different rehab facility that can help him towards a road of a normal life. From an ID standpoint:    The ideal course would be to obtain baseline and weekly labs including WBCwdiff, CMP, ESR, CRP then proceed with the plan of IV therapy for 6 weeks. This could consist of IV oritavancin weekly x 5 week starting 11/18 via peripheral line if pt can get transport. OR it could consist of IV daptomycin 500 mg daily via PICC at a SNF. If this therapy fails to close the wound and decrease inflammatory markers, he may need further surgical intervention. Due to negative cultures and intolerances, we can forego the gram negative and anaerobic coverage with PO cipro and PO flagyl. If patient absolutely cannot get IV infusions, we would still need labs for utilization of 2 PO agents for MRSA coverage. We would prefer to use PO linezolid and PO doxy x 6 weeks and then decrease to monotherapy likely for chronic osteo. Next Steps:    I called [facility] to see if they could get pt and wheelchair back to [facility] in order to ensure pt actually has disposition set up. They are unable to do so. Have a call out to inpatient case management, Erick Winslow, who initially worked on the case to see if he can be of assistance.      Piper Sebastian, PharmD, [address], [place] Infectious Disease  Phone: [phone] (also available on [employer])  Fax: [phone]    For Pharmacy [address] in place:  Yes  Time Spent (min): 60

## 2022-11-14 NOTE — CARE COORDINATION
DAMIEN received a call from Brooke in Pharmacy, [phone], who stated she is working with the patient outpatient to try and assist with needs and services. Patient apparently left AMA yesterday. Stated patient is currently at [facility] in [place]. Reportedly, patient can only stay there one more night. Patient either needs assistance with transportation 1x/week to/from the infusion center via wheelchair transport (patient is double amputee and in wheelchair at all times) or will need to go to a SNF to get daily IV antibiotics. Brooke stated that patient could start daily IV antibiotics at any time and it will not interfere with the dose of oritavancin he received on Friday. DAMIEN called and left a message to Zachary at [facility] asking if pt could be a community admit still at their SNF. Waiting a call back.     Electronically signed by CORNELIO Manuel LSW on 11/14/2022 at 1:20 PM

## 2022-11-14 NOTE — DISCHARGE INSTRUCTIONS
[facility], [address] [place] [address] Road  Telephone: [phone]     Discharge Instructions     Important reminders:     **If you have any signs and symptoms of illness (Cough, fever, congestion, nausea, vomiting, diarrhea, etc.) please call the wound care center prior to your appointment. 1. Increase Protein intake for optimal wound healing  2. No added salt to reduce any swelling  3. If diabetic, maintain good glucose control  4. If you smoke, smoking prohibits wound healing, we ask that you refrain from smoking. 5. When taking antibiotics take the entire prescription as ordered. Do not stop taking until medication is all gone unless otherwise instructed. 6. Exercise as tolerated. 7. Keep weight off wounds and reposition every 2 hours if applicable. 8. If wound(s) is on your lower extremity, elevate legs to the level of the heart or above for 30 minutes 4-5 times a day and/or when sitting. Avoid standing for long periods of time. 9. Do not get wounds wet in bath or shower unless otherwise instructed by your physician. If your wound is on your foot or leg, you may purchase a cast bag. Please ask at the pharmacy. If Vascular testing is ordered, please call [address] [place] [address] [phone]) to schedule. Vascular tests ordered by Wound Care Physicians may take up to 2 hours to complete. Please keep that in mind when scheduling. If Vascular testing is scheduled, please bring supplies to replace your dressing after testing is done. The vascular department does not stock supplies. Wound: Right lower extremity, Left lower extremity      With each dressing change, rinse wounds with 0.9% Saline. (May use wound wash or soft contact solution. Both can be purchased at a local drug store). If unable to obtain saline, may use a gentle soap and water. Dressing care: Right leg- Antibiotic ointment, bandaid- change daily.  Left leg- pack with Collagen, 4 x 4's & tape- change daily. Dr Kd Smith  Phone: [phone]  Fax: [phone]     Abilities in [address] [address] [facility]  [phone]        Home Care Agency/Facility:      Your wound-care supplies will be provided by: [address] f: [phone] f: [phone] p: [phone] [email or web address]    Please note, depending on your insurance coverage, you may have out-of-pocket expenses for these supplies. Someone from the company should call you to confirm your order and discuss those potential costs before they ship your products -- please anticipate that call. If your out-of-pocket cost could be substantial, Many companies have financial hardship programs for patients who qualify, so please ask about that if you might need a hand. If you have any questions about your supplies or your potential out-of-pocket costs, or if you need to place an order for a refill of supplies (typically monthly), please call the company directly. Your  is [facility]     Follow up with Dr Debbie Redd In 1 week(s) in the wound care center. Wound Care Center Information: Should you experience any significant changes in your wound(s) or have questions about your wound care, please contact the [employer] [facility] at [phone] Monday  - Thursday 8:00 am - 4:00 pm and Friday 8:00 am - 1:00pm. If you need help with your wound outside these hours and cannot wait until we are again available, contact your PCP or go to the hospital emergency room. PLEASE NOTE: IF YOU ARE UNABLE TO OBTAIN WOUND SUPPLIES, CONTINUE TO USE THE SUPPLIES YOU HAVE AVAILABLE UNTIL YOU ARE ABLE TO REACH US. IT IS MOST IMPORTANT TO KEEP THE WOUND COVERED AT ALL TIMES. Patient Experience     Thank you for trusting us with your care. You may receive a survey from a company called [employer] asking for your feedback.   We would appreciate it if you took a few minutes to share your experience.   Your input is very valuable to us

## 2022-11-15 ENCOUNTER — TELEPHONE (OUTPATIENT)
Dept: PRIMARY CARE CLINIC | Age: 52
End: 2022-11-15

## 2022-11-15 ENCOUNTER — TELEPHONE (OUTPATIENT)
Dept: INFECTIOUS DISEASES | Age: 52
End: 2022-11-15

## 2022-11-15 ENCOUNTER — HOSPITAL ENCOUNTER (OUTPATIENT)
Dept: WOUND CARE | Age: 52
Discharge: HOME | End: 2022-11-15

## 2022-11-15 DIAGNOSIS — E11.42 TYPE 2 DIABETES MELLITUS WITH DIABETIC POLYNEUROPATHY, WITH LONG-TERM CURRENT USE OF INSULIN (HCC): ICD-10-CM

## 2022-11-15 DIAGNOSIS — Z79.4 TYPE 2 DIABETES MELLITUS WITH DIABETIC POLYNEUROPATHY, WITH LONG-TERM CURRENT USE OF INSULIN (HCC): ICD-10-CM

## 2022-11-15 LAB
FECAL NEUTRAL FAT: NORMAL
FECAL SPLIT FATS: NORMAL
GRAM STAIN RESULT: ABNORMAL
ORGANISM: ABNORMAL
WOUND/ABSCESS: ABNORMAL
WOUND/ABSCESS: ABNORMAL

## 2022-11-15 NOTE — TELEPHONE ENCOUNTER
[facility] pt was admitted to [facility] from 11/9/22-11/14/22, he left AMA. Pt is  homeless, was at a rehab facility until he went to hospital, pt has been in a homeless shelter since leaving hospital, but doesn't know where he'll stay tonight. His insulin and glucometer were left at the rehab facility. [facility] is requesting new Rx for:    TrueMetrix Glucometer, alcohol swabs, lancets, test strips, needles  Or   FreeStyle Farooq sensors and meter  Humalog Kwikpen  Lantus  Needles 32G x 5MM    [facility] is trying to get pt into a shelter tonight and pt is working on getting into a drug rehab facility.    Please send Rx for Glucometer and supplies to [facility]

## 2022-11-15 NOTE — TELEPHONE ENCOUNTER
[address] is unable to accept patient back into their care. Spoke with patient who states he is going to [facility] [employer] for intake into drug rehab facility. He states his insurance [employer] can assist him with transport to appt Monday in infusion center at 10:30. Spoke with Kourtney Walker at [facility] and number given for [address] to get patient one additional night stay until he can get to them for intake.  She states she will call back with an update

## 2022-11-15 NOTE — TELEPHONE ENCOUNTER
Per [facility] is unable to take patient due to suboxone. Spoke with Shakila Lara at [facility] where patient was previously a resident. He states patient's care was a higher acuity than they could manage so he was sent to the hospital. Informed that patient just needs transportation to the infusion center at [facility] [place] on Fridays at this time. He states he will notify the  and talk the case over and notify office if they are able to accept patient.

## 2022-11-16 DIAGNOSIS — Z79.4 TYPE 2 DIABETES MELLITUS WITH DIABETIC POLYNEUROPATHY, WITH LONG-TERM CURRENT USE OF INSULIN (HCC): ICD-10-CM

## 2022-11-16 DIAGNOSIS — E11.42 TYPE 2 DIABETES MELLITUS WITH DIABETIC POLYNEUROPATHY, WITH LONG-TERM CURRENT USE OF INSULIN (HCC): ICD-10-CM

## 2022-11-16 LAB — CALPROTECTIN, FECAL: 117 UG/G

## 2022-11-16 RX ORDER — FLASH GLUCOSE SENSOR
1 KIT MISCELLANEOUS 4 TIMES DAILY
Qty: 1 EACH | Refills: 12 | Status: SHIPPED | OUTPATIENT
Start: 2022-11-16 | End: 2023-11-11

## 2022-11-16 RX ORDER — FLASH GLUCOSE SCANNING READER
1 EACH MISCELLANEOUS DAILY
Qty: 1 EACH | Refills: 0 | Status: SHIPPED | OUTPATIENT
Start: 2022-11-16 | End: 2023-05-15

## 2022-11-16 RX ORDER — INSULIN GLARGINE 100 [IU]/ML
18 INJECTION, SOLUTION SUBCUTANEOUS NIGHTLY
Qty: 5 ADJUSTABLE DOSE PRE-FILLED PEN SYRINGE | Refills: 3 | Status: SHIPPED | OUTPATIENT
Start: 2022-11-16 | End: 2023-02-14

## 2022-11-16 RX ORDER — BLOOD PRESSURE TEST KIT
1 KIT MISCELLANEOUS 4 TIMES DAILY
Qty: 120 EACH | Refills: 2 | Status: SHIPPED | OUTPATIENT
Start: 2022-11-16 | End: 2022-11-21 | Stop reason: SDUPTHER

## 2022-11-16 RX ORDER — INSULIN LISPRO 100 [IU]/ML
8 INJECTION, SOLUTION INTRAVENOUS; SUBCUTANEOUS
Qty: 43.2 ML | Refills: 0 | Status: SHIPPED | OUTPATIENT
Start: 2022-11-16 | End: 2023-05-15

## 2022-11-16 NOTE — TELEPHONE ENCOUNTER
Pt previously used [facility], but no longer has transportation to that pharmacy. Pt is only able to get [facility].

## 2022-11-17 ENCOUNTER — TELEPHONE (OUTPATIENT)
Dept: PRIMARY CARE CLINIC | Age: 52
End: 2022-11-17

## 2022-11-17 LAB — ANAEROBIC CULTURE: NORMAL

## 2022-11-17 NOTE — TELEPHONE ENCOUNTER
Case/patient disposition has evolved throughout the week. Pt is currently accepted at [address] [place] [address] drug rehab under the RN Moose [facility]el and [facility] ([phone]). Staff does have concerns about medical conditions and complexity of patient case involving polysubstance abuse on Suboxone, poor glycemic control with no current access to insulin (at [facility] still), and osteo of stump. In addition, pt cannot miss group at rehab facility each week to get his infusion. He cannot come to infusion center until 1430 each Monday. I told staff that as long as he comes on Monday and shows compliance, we can talk about changing long duration orbactiv to shorter duration dalbavancin with appt times after 1430. SNF placement with Mary GOMEZ prescribing the suboxone could still be an option to explore to get patient more medically stable, however, I have serious concerns about PICC placement. Pt states that he is clean, however, told rehab program he is still actively using. WIll await to see if patient comes to infusion center on Monday.      Watson Flores, PharmD, [facility] [place], [place] Infectious Disease  Phone: [phone] (also available on [email or web address])  Fax: [phone]    For Pharmacy [address] [facility] [address] in place:  Yes  Time Spent (min): 15

## 2022-11-17 NOTE — TELEPHONE ENCOUNTER
[address] Prosthetics advised practice has not been able to contact pt to schedule appt.

## 2022-11-18 ENCOUNTER — TELEPHONE (OUTPATIENT)
Dept: PRIMARY CARE CLINIC | Age: 52
End: 2022-11-18

## 2022-11-18 NOTE — TELEPHONE ENCOUNTER
----- Message from Kuldip Roberts sent at 11/18/2022  9:36 AM EST -----  Subject: Refill Request    QUESTIONS  Name of Medication? pregabalin (LYRICA) 150 MG capsule  Patient-reported dosage and instructions? two 150 MG capsules, daily  How many days do you have left? 2  Preferred Pharmacy? [facility] phone number (if available)? [phone]  ---------------------------------------------------------------------------  --------------,  Name of Medication? tamsulosin (FLOMAX) 0.4 MG capsule  Patient-reported dosage and instructions? one , .4 MG capsule, daily  How many days do you have left? 2  Preferred Pharmacy? [facility] phone number (if available)? [phone]  ---------------------------------------------------------------------------  --------------,  Name of Medication? Continuous Blood Gluc Sensor (FREESTYLE MARCELLA 2   SENSOR) MISC  Patient-reported dosage and instructions? FREESTYLE MARCELLA 2 SENSOR   How many days do you have left? 0  Preferred Pharmacy? [facility] phone number (if available)? [phone]  Additional Information for Provider? Needs a regular diabetic meter as   well. Reyes says he needs a regular meter   ---------------------------------------------------------------------------  --------------  CALL BACK INFO  What is the best way for the office to contact you? OK to leave message on   voicemail  Preferred Call Back Phone Number? [phone]  ---------------------------------------------------------------------------  --------------  SCRIPT ANSWERS  Relationship to Patient?  Self

## 2022-11-18 NOTE — TELEPHONE ENCOUNTER
----- Message from Amaricamelia Lui sent at 11/18/2022  9:37 AM EST -----  Subject: Message to Provider    QUESTIONS  Information for Provider? Pt needing a refill on his Pentox as soon as   possible. In a rehab facility and needs to get this as soon as possible. Please advise.   ---------------------------------------------------------------------------  --------------  Jered Morataya INFO  [phone]; OK to leave message on voicemail  ---------------------------------------------------------------------------  --------------  SCRIPT ANSWERS  Relationship to Patient?  Self

## 2022-11-18 NOTE — TELEPHONE ENCOUNTER
----- Message from Lila Cesilia sent at 11/18/2022  9:36 AM EST -----  Subject: Refill Request    QUESTIONS  Name of Medication? pregabalin (LYRICA) 150 MG capsule  Patient-reported dosage and instructions? two 150 MG capsules, daily  How many days do you have left? 2  Preferred Pharmacy? [facility] phone number (if available)? [phone]  ---------------------------------------------------------------------------  --------------,  Name of Medication? tamsulosin (FLOMAX) 0.4 MG capsule  Patient-reported dosage and instructions? one , .4 MG capsule, daily  How many days do you have left? 2  Preferred Pharmacy? [facility] phone number (if available)? [phone]  ---------------------------------------------------------------------------  --------------,  Name of Medication? Continuous Blood Gluc Sensor (FREESTYLE MARCELLA 2   SENSOR) MISC  Patient-reported dosage and instructions? FREESTYLE MARCELLA 2 SENSOR   How many days do you have left? 0  Preferred Pharmacy? [facility] phone number (if available)? [phone]  Additional Information for Provider? Needs a regular diabetic meter as   well. Reyes says he needs a regular meter   ---------------------------------------------------------------------------  --------------  CALL BACK INFO  What is the best way for the office to contact you? OK to leave message on   voicemail  Preferred Call Back Phone Number? [phone]  ---------------------------------------------------------------------------  --------------  SCRIPT ANSWERS  Relationship to Patient?  Self

## 2022-11-21 ENCOUNTER — TELEPHONE (OUTPATIENT)
Dept: INFECTIOUS DISEASES | Age: 52
End: 2022-11-21

## 2022-11-21 ENCOUNTER — HOSPITAL ENCOUNTER (OUTPATIENT)
Dept: ONCOLOGY | Age: 52
Setting detail: INFUSION SERIES
Discharge: HOME | End: 2022-11-21
Payer: MEDICAID

## 2022-11-21 VITALS
TEMPERATURE: 98 F | RESPIRATION RATE: 16 BRPM | SYSTOLIC BLOOD PRESSURE: 180 MMHG | DIASTOLIC BLOOD PRESSURE: 82 MMHG | HEART RATE: 80 BPM

## 2022-11-21 DIAGNOSIS — Z79.4 TYPE 2 DIABETES MELLITUS WITH DIABETIC POLYNEUROPATHY, WITH LONG-TERM CURRENT USE OF INSULIN (HCC): ICD-10-CM

## 2022-11-21 DIAGNOSIS — E11.69 ERECTILE DYSFUNCTION DUE TO DIABETES MELLITUS (HCC): ICD-10-CM

## 2022-11-21 DIAGNOSIS — E11.42 TYPE 2 DIABETES MELLITUS WITH DIABETIC POLYNEUROPATHY, WITH LONG-TERM CURRENT USE OF INSULIN (HCC): ICD-10-CM

## 2022-11-21 DIAGNOSIS — A49.02 MRSA (METHICILLIN RESISTANT STAPHYLOCOCCUS AUREUS) INFECTION: Primary | ICD-10-CM

## 2022-11-21 DIAGNOSIS — M86.19 OTHER ACUTE OSTEOMYELITIS, MULTIPLE SITES (HCC): ICD-10-CM

## 2022-11-21 DIAGNOSIS — N52.1 ERECTILE DYSFUNCTION DUE TO DIABETES MELLITUS (HCC): ICD-10-CM

## 2022-11-21 DIAGNOSIS — E11.42 DIABETIC POLYNEUROPATHY ASSOCIATED WITH TYPE 2 DIABETES MELLITUS (HCC): Primary | ICD-10-CM

## 2022-11-21 LAB
A/G RATIO: 0.6 (ref 1.1–2.2)
ALBUMIN SERPL-MCNC: 2.7 G/DL (ref 3.4–5)
ALP BLD-CCNC: 86 U/L (ref 40–129)
ALT SERPL-CCNC: 15 U/L (ref 10–40)
ANION GAP SERPL CALCULATED.3IONS-SCNC: 7 MMOL/L (ref 3–16)
AST SERPL-CCNC: 22 U/L (ref 15–37)
BASOPHILS ABSOLUTE: 0.1 K/UL (ref 0–0.2)
BASOPHILS RELATIVE PERCENT: 1.4 %
BILIRUB SERPL-MCNC: <0.2 MG/DL (ref 0–1)
BUN BLDV-MCNC: 31 MG/DL (ref 7–20)
CALCIUM SERPL-MCNC: 8.8 MG/DL (ref 8.3–10.6)
CHLORIDE BLD-SCNC: 100 MMOL/L (ref 99–110)
CO2: 23 MMOL/L (ref 21–32)
CREAT SERPL-MCNC: 1.3 MG/DL (ref 0.9–1.3)
EOSINOPHILS ABSOLUTE: 0.4 K/UL (ref 0–0.6)
EOSINOPHILS RELATIVE PERCENT: 6 %
GFR SERPL CREATININE-BSD FRML MDRD: >60 ML/MIN/{1.73_M2}
GLUCOSE BLD-MCNC: 288 MG/DL (ref 70–99)
HCT VFR BLD CALC: 33 % (ref 40.5–52.5)
HEMOGLOBIN: 11.1 G/DL (ref 13.5–17.5)
LYMPHOCYTES ABSOLUTE: 1.7 K/UL (ref 1–5.1)
LYMPHOCYTES RELATIVE PERCENT: 22.2 %
MCH RBC QN AUTO: 29.4 PG (ref 26–34)
MCHC RBC AUTO-ENTMCNC: 33.5 G/DL (ref 31–36)
MCV RBC AUTO: 87.9 FL (ref 80–100)
MONOCYTES ABSOLUTE: 0.5 K/UL (ref 0–1.3)
MONOCYTES RELATIVE PERCENT: 6 %
NEUTROPHILS ABSOLUTE: 4.8 K/UL (ref 1.7–7.7)
NEUTROPHILS RELATIVE PERCENT: 64.4 %
PDW BLD-RTO: 14.6 % (ref 12.4–15.4)
PLATELET # BLD: 285 K/UL (ref 135–450)
PMV BLD AUTO: 8.1 FL (ref 5–10.5)
POTASSIUM SERPL-SCNC: 6.1 MMOL/L (ref 3.5–5.1)
RBC # BLD: 3.76 M/UL (ref 4.2–5.9)
SODIUM BLD-SCNC: 130 MMOL/L (ref 136–145)
TOTAL PROTEIN: 6.9 G/DL (ref 6.4–8.2)
WBC # BLD: 7.5 K/UL (ref 4–11)

## 2022-11-21 PROCEDURE — 96366 THER/PROPH/DIAG IV INF ADDON: CPT

## 2022-11-21 PROCEDURE — 85025 COMPLETE CBC W/AUTO DIFF WBC: CPT

## 2022-11-21 PROCEDURE — 96365 THER/PROPH/DIAG IV INF INIT: CPT

## 2022-11-21 PROCEDURE — 99211 OFF/OP EST MAY X REQ PHY/QHP: CPT

## 2022-11-21 PROCEDURE — 6360000002 HC RX W HCPCS: Performed by: INTERNAL MEDICINE

## 2022-11-21 PROCEDURE — 80053 COMPREHEN METABOLIC PANEL: CPT

## 2022-11-21 PROCEDURE — 2580000003 HC RX 258: Performed by: INTERNAL MEDICINE

## 2022-11-21 RX ORDER — PREGABALIN 150 MG/1
150 CAPSULE ORAL 2 TIMES DAILY
Qty: 60 CAPSULE | Refills: 2 | Status: SHIPPED | OUTPATIENT
Start: 2022-11-21 | End: 2023-02-19

## 2022-11-21 RX ORDER — HEPARIN SODIUM (PORCINE) LOCK FLUSH IV SOLN 100 UNIT/ML 100 UNIT/ML
500 SOLUTION INTRAVENOUS PRN
Status: CANCELLED | OUTPATIENT
Start: 2022-11-28

## 2022-11-21 RX ORDER — ACETAMINOPHEN 325 MG/1
650 TABLET ORAL
Status: CANCELLED | OUTPATIENT
Start: 2022-11-28

## 2022-11-21 RX ORDER — LANCETS 30 GAUGE
1 EACH MISCELLANEOUS 4 TIMES DAILY
Qty: 200 EACH | Refills: 5 | Status: SHIPPED | OUTPATIENT
Start: 2022-11-21

## 2022-11-21 RX ORDER — TAMSULOSIN HYDROCHLORIDE 0.4 MG/1
0.4 CAPSULE ORAL DAILY
Qty: 30 CAPSULE | Refills: 2 | Status: SHIPPED | OUTPATIENT
Start: 2022-11-21 | End: 2023-02-19

## 2022-11-21 RX ORDER — ALBUTEROL SULFATE 90 UG/1
4 AEROSOL, METERED RESPIRATORY (INHALATION) PRN
Status: CANCELLED | OUTPATIENT
Start: 2022-11-28

## 2022-11-21 RX ORDER — ONDANSETRON 2 MG/ML
8 INJECTION INTRAMUSCULAR; INTRAVENOUS
Status: CANCELLED | OUTPATIENT
Start: 2022-11-28

## 2022-11-21 RX ORDER — SODIUM CHLORIDE 9 MG/ML
5-250 INJECTION, SOLUTION INTRAVENOUS PRN
Status: CANCELLED | OUTPATIENT
Start: 2022-11-28

## 2022-11-21 RX ORDER — BLOOD-GLUCOSE METER
1 KIT MISCELLANEOUS DAILY
Qty: 1 KIT | Refills: 0 | Status: SHIPPED | OUTPATIENT
Start: 2022-11-21

## 2022-11-21 RX ORDER — GLUCOSAMINE HCL/CHONDROITIN SU 500-400 MG
CAPSULE ORAL
Qty: 200 STRIP | Refills: 5 | Status: SHIPPED | OUTPATIENT
Start: 2022-11-21

## 2022-11-21 RX ORDER — BLOOD PRESSURE TEST KIT
1 KIT MISCELLANEOUS 4 TIMES DAILY
Qty: 120 EACH | Refills: 2 | Status: SHIPPED | OUTPATIENT
Start: 2022-11-21 | End: 2023-05-20

## 2022-11-21 RX ORDER — SODIUM CHLORIDE 0.9 % (FLUSH) 0.9 %
5-40 SYRINGE (ML) INJECTION PRN
Status: DISCONTINUED | OUTPATIENT
Start: 2022-11-21 | End: 2022-11-22 | Stop reason: HOSPADM

## 2022-11-21 RX ORDER — SODIUM CHLORIDE 0.9 % (FLUSH) 0.9 %
5-40 SYRINGE (ML) INJECTION PRN
Status: CANCELLED | OUTPATIENT
Start: 2022-11-28

## 2022-11-21 RX ORDER — SODIUM CHLORIDE 9 MG/ML
INJECTION, SOLUTION INTRAVENOUS CONTINUOUS
Status: CANCELLED | OUTPATIENT
Start: 2022-11-28

## 2022-11-21 RX ORDER — DIPHENHYDRAMINE HYDROCHLORIDE 50 MG/ML
50 INJECTION INTRAMUSCULAR; INTRAVENOUS
Status: CANCELLED | OUTPATIENT
Start: 2022-11-28

## 2022-11-21 RX ADMIN — SODIUM CHLORIDE, PRESERVATIVE FREE 10 ML: 5 INJECTION INTRAVENOUS at 11:06

## 2022-11-21 RX ADMIN — ORITAVANCIN 1200 MG: 400 INJECTION, POWDER, LYOPHILIZED, FOR SOLUTION INTRAVENOUS at 12:14

## 2022-11-21 NOTE — TELEPHONE ENCOUNTER
I tried multiple lumbar spine to contact the patient as I do see the critical lab for him with a potassium level of 6.1. I was able to get hold of his  on contact #[phone]. She told me that she is patient's primary contact at this time. The patient does not have any other contact numbers, unfortunately. I have asked her to contact the patient and sent him to the ER right away due to high potassium level.  Tj Ambrosio will be contacting the patient to advise him to go to the ER right away. Arabella Taylor MD, MPH, Arvind Yanez [facility]  11/21/2022, 4:53 PM  [facility] Infectious Disease   [address] [place] [address], [address] [place] [address]  Office: [phone]  Fax: [phone]  In-person Clinic days:  Tuesday & Thursday a.m. Virtual clinic days: Monday, Wednesday & Friday a.m.

## 2022-11-21 NOTE — PROGRESS NOTES
Patient in wheelchair for first time infusion with Orvactiv IV. IV obtained, labs drawn. AVS reviewed and given to patient. Patient tolerated well. No adverse reactions noted. Lab called with a panic potassium level of 6.1. Instructed patient to go to ER. Patient refused to go to ER and patient stated will f/u when gets back to group home. Called Ana Gonzalez to notify of potassium level will f/u with patient.

## 2022-11-22 NOTE — TELEPHONE ENCOUNTER
Pt admitted at [address] [facility]. K down to 5.8 now. Pt refused admittance to [facility] due to fear of receiving \"the high potassium powder\" that made him defecate on himself. He states he will never go to that hospital again. I educated him that the PO flagyl and cipro were also likely causes of diarrhea and these have been stopped. I am trying to minimize his fear of kayexalate. Pt was agreeable to go to [facility] where he is being treated. His contract with [facility] states that he can only be admitted for 3 days or less. Patient's biggest concerns are his sores on his rectum and scrotum from all the diarrhea he had in the past. I instructed him to see if wound care or the RNs at [address] [facility] can inspect them and help in any way.      Aniyah Vera, PharmD, [address], [place] Infectious Disease  Phone: [phone] (also available on [employer])  Fax: [phone]    For Pharmacy [address] [place] [address] Street in place:  Yes  Time Spent (min): 15

## 2022-11-28 ENCOUNTER — HOSPITAL ENCOUNTER (OUTPATIENT)
Dept: ONCOLOGY | Age: 52
Setting detail: INFUSION SERIES
Discharge: HOME | End: 2022-11-28
Payer: MEDICAID

## 2022-11-28 VITALS
SYSTOLIC BLOOD PRESSURE: 188 MMHG | HEART RATE: 81 BPM | RESPIRATION RATE: 16 BRPM | TEMPERATURE: 98.6 F | DIASTOLIC BLOOD PRESSURE: 88 MMHG

## 2022-11-28 DIAGNOSIS — A49.02 MRSA (METHICILLIN RESISTANT STAPHYLOCOCCUS AUREUS) INFECTION: Primary | ICD-10-CM

## 2022-11-28 DIAGNOSIS — M86.19 OTHER ACUTE OSTEOMYELITIS, MULTIPLE SITES (HCC): ICD-10-CM

## 2022-11-28 LAB
A/G RATIO: 0.8 (ref 1.1–2.2)
ALBUMIN SERPL-MCNC: 3.1 G/DL (ref 3.4–5)
ALP BLD-CCNC: 84 U/L (ref 40–129)
ALT SERPL-CCNC: 15 U/L (ref 10–40)
ANION GAP SERPL CALCULATED.3IONS-SCNC: 5 MMOL/L (ref 3–16)
AST SERPL-CCNC: 17 U/L (ref 15–37)
BASOPHILS ABSOLUTE: 0.1 K/UL (ref 0–0.2)
BASOPHILS RELATIVE PERCENT: 1.4 %
BILIRUB SERPL-MCNC: <0.2 MG/DL (ref 0–1)
BUN BLDV-MCNC: 35 MG/DL (ref 7–20)
CALCIUM SERPL-MCNC: 9.2 MG/DL (ref 8.3–10.6)
CHLORIDE BLD-SCNC: 111 MMOL/L (ref 99–110)
CO2: 22 MMOL/L (ref 21–32)
CREAT SERPL-MCNC: 1 MG/DL (ref 0.9–1.3)
EOSINOPHILS ABSOLUTE: 0.5 K/UL (ref 0–0.6)
EOSINOPHILS RELATIVE PERCENT: 6.1 %
GFR SERPL CREATININE-BSD FRML MDRD: >60 ML/MIN/{1.73_M2}
GLUCOSE BLD-MCNC: 162 MG/DL (ref 70–99)
HCT VFR BLD CALC: 32.9 % (ref 40.5–52.5)
HEMOGLOBIN: 11.1 G/DL (ref 13.5–17.5)
LYMPHOCYTES ABSOLUTE: 1.7 K/UL (ref 1–5.1)
LYMPHOCYTES RELATIVE PERCENT: 20.3 %
MCH RBC QN AUTO: 29.7 PG (ref 26–34)
MCHC RBC AUTO-ENTMCNC: 33.7 G/DL (ref 31–36)
MCV RBC AUTO: 88.1 FL (ref 80–100)
MONOCYTES ABSOLUTE: 0.6 K/UL (ref 0–1.3)
MONOCYTES RELATIVE PERCENT: 7.6 %
NEUTROPHILS ABSOLUTE: 5.3 K/UL (ref 1.7–7.7)
NEUTROPHILS RELATIVE PERCENT: 64.6 %
PDW BLD-RTO: 14.3 % (ref 12.4–15.4)
PLATELET # BLD: 283 K/UL (ref 135–450)
PMV BLD AUTO: 7.6 FL (ref 5–10.5)
POTASSIUM SERPL-SCNC: 5.9 MMOL/L (ref 3.5–5.1)
RBC # BLD: 3.73 M/UL (ref 4.2–5.9)
SODIUM BLD-SCNC: 138 MMOL/L (ref 136–145)
TOTAL PROTEIN: 7.1 G/DL (ref 6.4–8.2)
WBC # BLD: 8.2 K/UL (ref 4–11)

## 2022-11-28 PROCEDURE — 6360000002 HC RX W HCPCS: Performed by: INTERNAL MEDICINE

## 2022-11-28 PROCEDURE — 99211 OFF/OP EST MAY X REQ PHY/QHP: CPT

## 2022-11-28 PROCEDURE — 80053 COMPREHEN METABOLIC PANEL: CPT

## 2022-11-28 PROCEDURE — 96365 THER/PROPH/DIAG IV INF INIT: CPT

## 2022-11-28 PROCEDURE — 2580000003 HC RX 258: Performed by: INTERNAL MEDICINE

## 2022-11-28 PROCEDURE — 85025 COMPLETE CBC W/AUTO DIFF WBC: CPT

## 2022-11-28 RX ORDER — HEPARIN SODIUM (PORCINE) LOCK FLUSH IV SOLN 100 UNIT/ML 100 UNIT/ML
500 SOLUTION INTRAVENOUS PRN
Status: CANCELLED | OUTPATIENT
Start: 2022-12-05

## 2022-11-28 RX ORDER — SODIUM CHLORIDE 9 MG/ML
INJECTION, SOLUTION INTRAVENOUS CONTINUOUS
Status: CANCELLED | OUTPATIENT
Start: 2022-12-05

## 2022-11-28 RX ORDER — SODIUM CHLORIDE 0.9 % (FLUSH) 0.9 %
5-40 SYRINGE (ML) INJECTION PRN
Status: CANCELLED | OUTPATIENT
Start: 2022-12-05

## 2022-11-28 RX ORDER — ACETAMINOPHEN 325 MG/1
650 TABLET ORAL
Status: CANCELLED | OUTPATIENT
Start: 2022-12-05

## 2022-11-28 RX ORDER — ALBUTEROL SULFATE 90 UG/1
4 AEROSOL, METERED RESPIRATORY (INHALATION) PRN
Status: CANCELLED | OUTPATIENT
Start: 2022-12-05

## 2022-11-28 RX ORDER — DIPHENHYDRAMINE HYDROCHLORIDE 50 MG/ML
50 INJECTION INTRAMUSCULAR; INTRAVENOUS
Status: CANCELLED | OUTPATIENT
Start: 2022-12-05

## 2022-11-28 RX ORDER — SODIUM CHLORIDE 0.9 % (FLUSH) 0.9 %
5-40 SYRINGE (ML) INJECTION PRN
Status: DISCONTINUED | OUTPATIENT
Start: 2022-11-28 | End: 2022-11-29 | Stop reason: HOSPADM

## 2022-11-28 RX ORDER — SODIUM CHLORIDE 9 MG/ML
5-250 INJECTION, SOLUTION INTRAVENOUS PRN
Status: DISCONTINUED | OUTPATIENT
Start: 2022-11-28 | End: 2022-11-29 | Stop reason: HOSPADM

## 2022-11-28 RX ORDER — DEXTROSE MONOHYDRATE 50 MG/ML
5 INJECTION, SOLUTION INTRAVENOUS PRN
Status: DISCONTINUED | OUTPATIENT
Start: 2022-11-28 | End: 2022-11-29 | Stop reason: HOSPADM

## 2022-11-28 RX ORDER — ONDANSETRON 2 MG/ML
8 INJECTION INTRAMUSCULAR; INTRAVENOUS
Status: CANCELLED | OUTPATIENT
Start: 2022-12-05

## 2022-11-28 RX ORDER — SODIUM CHLORIDE 9 MG/ML
5-250 INJECTION, SOLUTION INTRAVENOUS PRN
Status: CANCELLED | OUTPATIENT
Start: 2022-12-05

## 2022-11-28 RX ADMIN — ORITAVANCIN DIPHOSPHATE: 1200 INJECTION, POWDER, LYOPHILIZED, FOR SOLUTION INTRAVENOUS at 14:58

## 2022-11-28 RX ADMIN — DEXTROSE MONOHYDRATE 100 ML: 50 INJECTION, SOLUTION INTRAVENOUS at 14:57

## 2022-11-28 NOTE — PROGRESS NOTES
Pt to Dept for Lab draw and Kimyrsa infusion. Pt vernon infusion with no adverse reaction over 1 hour. Denied need for printed AVS. Potassium level 5.9,previously admitted at [address] [facility] for Hyperkalemia. To notify Infectious disease of elevated potassium level. Pt discharged back to facility with electric wheelchair per transportation service.  Pt to return next Monday for same treatment

## 2022-11-29 ENCOUNTER — TELEPHONE (OUTPATIENT)
Dept: PRIMARY CARE CLINIC | Age: 52
End: 2022-11-29

## 2022-11-29 ENCOUNTER — TELEPHONE (OUTPATIENT)
Dept: INFECTIOUS DISEASES | Age: 52
End: 2022-11-29

## 2022-11-29 NOTE — TELEPHONE ENCOUNTER
Can check tomorrow Peripheral stick BMP if it can be arranged-   looks like he had hemolysis on the specimen from 11/28 ask him to follow low K+ diet no citrus, grapes, bananas,

## 2022-11-29 NOTE — TELEPHONE ENCOUNTER
Spoke with Claudia, nurse at Infusion center. She states patient continues to refuse treatment for high K.  Attempted to call patient, no answer and unable to leave voicemail

## 2022-11-29 NOTE — TELEPHONE ENCOUNTER
Called patients cell phone and unable to leave message to advise of MD note. Await call back from previous message left with [employer].

## 2022-11-29 NOTE — TELEPHONE ENCOUNTER
Patient left VM on pharmacist line requesting call back to [facility] at [phone], requesting physician call in [address]. He states he knows some of his levels are high and need to be discussed. Called above number and no answer, left message requesting call back.

## 2022-11-29 NOTE — TELEPHONE ENCOUNTER
Looks like he is on oral abx and the current abx will not cause K+ elevation and specimen from 11/28 has hemolysis and this has to addressed by his PCP not sure if needs lab monitoring while on oral abx

## 2022-11-29 NOTE — TELEPHONE ENCOUNTER
Pt is requesting medication for diarrhea from IV antibiotic he is receiving from Infectious Disease. Pt has been having issues since starting treatment. States diarrhea is so significant he had an accident while in bed last night.     Pharmacy: [facility]

## 2022-11-30 ENCOUNTER — TELEPHONE (OUTPATIENT)
Dept: INFECTIOUS DISEASES | Age: 52
End: 2022-11-30

## 2022-11-30 DIAGNOSIS — K52.1 DIARRHEA DUE TO DRUG: Primary | ICD-10-CM

## 2022-11-30 RX ORDER — LOPERAMIDE HYDROCHLORIDE 2 MG/1
2 CAPSULE ORAL 4 TIMES DAILY PRN
Qty: 30 CAPSULE | Refills: 0 | Status: SHIPPED | OUTPATIENT
Start: 2022-11-30 | End: 2022-12-10

## 2022-11-30 NOTE — TELEPHONE ENCOUNTER
Received VM from patient returning call. Attempted to return call to [facility] to advise of need for BMP and that immodium has been sent to pharmacy. Also advised of low K diet.  Requested call back

## 2022-11-30 NOTE — TELEPHONE ENCOUNTER
Received voicemail from patient stating he is in [address] [facility] rehab facility and having significant diarrhea, requesting immodium sent to [facility] pharmacy. LMOM to request patient have repeat BMP per Dr. Iker Gan note from 11/29/22 and advise on low K diet, requesting call back.

## 2022-12-05 ENCOUNTER — TELEPHONE (OUTPATIENT)
Dept: INFECTIOUS DISEASES | Age: 52
End: 2022-12-05

## 2022-12-05 ENCOUNTER — HOSPITAL ENCOUNTER (OUTPATIENT)
Dept: ONCOLOGY | Age: 52
Setting detail: INFUSION SERIES
Discharge: HOME | DRG: 425 | End: 2022-12-05
Payer: MEDICAID

## 2022-12-05 ENCOUNTER — HOSPITAL ENCOUNTER (INPATIENT)
Age: 52
LOS: 2 days | Discharge: OTHER INSTITUTION | DRG: 425 | End: 2022-12-07
Attending: FAMILY MEDICINE | Admitting: FAMILY MEDICINE
Payer: MEDICAID

## 2022-12-05 VITALS
SYSTOLIC BLOOD PRESSURE: 145 MMHG | WEIGHT: 165 LBS | HEART RATE: 78 BPM | DIASTOLIC BLOOD PRESSURE: 72 MMHG | TEMPERATURE: 98 F | RESPIRATION RATE: 16 BRPM

## 2022-12-05 DIAGNOSIS — N17.9 AKI (ACUTE KIDNEY INJURY) (HCC): ICD-10-CM

## 2022-12-05 DIAGNOSIS — A49.02 MRSA (METHICILLIN RESISTANT STAPHYLOCOCCUS AUREUS) INFECTION: Primary | ICD-10-CM

## 2022-12-05 DIAGNOSIS — E87.5 HYPERKALEMIA: Primary | ICD-10-CM

## 2022-12-05 DIAGNOSIS — M86.19 OTHER ACUTE OSTEOMYELITIS, MULTIPLE SITES (HCC): ICD-10-CM

## 2022-12-05 DIAGNOSIS — E83.42 HYPOMAGNESEMIA: ICD-10-CM

## 2022-12-05 LAB
A/G RATIO: 0.7 (ref 1.1–2.2)
A/G RATIO: 0.9 (ref 1.1–2.2)
ALBUMIN SERPL-MCNC: 2.9 G/DL (ref 3.4–5)
ALBUMIN SERPL-MCNC: 3.1 G/DL (ref 3.4–5)
ALP BLD-CCNC: 88 U/L (ref 40–129)
ALP BLD-CCNC: 93 U/L (ref 40–129)
ALT SERPL-CCNC: 10 U/L (ref 10–40)
ALT SERPL-CCNC: 10 U/L (ref 10–40)
ANION GAP SERPL CALCULATED.3IONS-SCNC: 6 MMOL/L (ref 3–16)
ANION GAP SERPL CALCULATED.3IONS-SCNC: 8 MMOL/L (ref 3–16)
AST SERPL-CCNC: 13 U/L (ref 15–37)
AST SERPL-CCNC: 14 U/L (ref 15–37)
BASOPHILS ABSOLUTE: 0.2 K/UL (ref 0–0.2)
BASOPHILS RELATIVE PERCENT: 2.1 %
BILIRUB SERPL-MCNC: <0.2 MG/DL (ref 0–1)
BILIRUB SERPL-MCNC: <0.2 MG/DL (ref 0–1)
BUN BLDV-MCNC: 37 MG/DL (ref 7–20)
BUN BLDV-MCNC: 37 MG/DL (ref 7–20)
CALCIUM SERPL-MCNC: 8.9 MG/DL (ref 8.3–10.6)
CALCIUM SERPL-MCNC: 9.2 MG/DL (ref 8.3–10.6)
CHLORIDE BLD-SCNC: 107 MMOL/L (ref 99–110)
CHLORIDE BLD-SCNC: 111 MMOL/L (ref 99–110)
CO2: 21 MMOL/L (ref 21–32)
CO2: 22 MMOL/L (ref 21–32)
CREAT SERPL-MCNC: 1 MG/DL (ref 0.9–1.3)
CREAT SERPL-MCNC: 1.1 MG/DL (ref 0.9–1.3)
EOSINOPHILS ABSOLUTE: 0.5 K/UL (ref 0–0.6)
EOSINOPHILS RELATIVE PERCENT: 6.4 %
GFR SERPL CREATININE-BSD FRML MDRD: >60 ML/MIN/{1.73_M2}
GFR SERPL CREATININE-BSD FRML MDRD: >60 ML/MIN/{1.73_M2}
GLUCOSE BLD-MCNC: 103 MG/DL (ref 70–99)
GLUCOSE BLD-MCNC: 184 MG/DL (ref 70–99)
GLUCOSE BLD-MCNC: 93 MG/DL (ref 70–99)
HCT VFR BLD CALC: 32.9 % (ref 40.5–52.5)
HEMOGLOBIN: 11.1 G/DL (ref 13.5–17.5)
LYMPHOCYTES ABSOLUTE: 1.4 K/UL (ref 1–5.1)
LYMPHOCYTES RELATIVE PERCENT: 18 %
MCH RBC QN AUTO: 30 PG (ref 26–34)
MCHC RBC AUTO-ENTMCNC: 33.8 G/DL (ref 31–36)
MCV RBC AUTO: 88.9 FL (ref 80–100)
MONOCYTES ABSOLUTE: 0.6 K/UL (ref 0–1.3)
MONOCYTES RELATIVE PERCENT: 7.4 %
NEUTROPHILS ABSOLUTE: 5.1 K/UL (ref 1.7–7.7)
NEUTROPHILS RELATIVE PERCENT: 66.1 %
PDW BLD-RTO: 14.7 % (ref 12.4–15.4)
PERFORMED ON: ABNORMAL
PLATELET # BLD: 253 K/UL (ref 135–450)
PMV BLD AUTO: 8.2 FL (ref 5–10.5)
POTASSIUM REFLEX MAGNESIUM: 6.1 MMOL/L (ref 3.5–5.1)
POTASSIUM SERPL-SCNC: 5.9 MMOL/L (ref 3.5–5.1)
POTASSIUM SERPL-SCNC: 6.4 MMOL/L (ref 3.5–5.1)
RBC # BLD: 3.7 M/UL (ref 4.2–5.9)
SODIUM BLD-SCNC: 135 MMOL/L (ref 136–145)
SODIUM BLD-SCNC: 140 MMOL/L (ref 136–145)
TOTAL PROTEIN: 6.5 G/DL (ref 6.4–8.2)
TOTAL PROTEIN: 7 G/DL (ref 6.4–8.2)
WBC # BLD: 7.6 K/UL (ref 4–11)

## 2022-12-05 PROCEDURE — 99211 OFF/OP EST MAY X REQ PHY/QHP: CPT

## 2022-12-05 PROCEDURE — 80053 COMPREHEN METABOLIC PANEL: CPT

## 2022-12-05 PROCEDURE — 6360000002 HC RX W HCPCS

## 2022-12-05 PROCEDURE — 85025 COMPLETE CBC W/AUTO DIFF WBC: CPT

## 2022-12-05 PROCEDURE — 83036 HEMOGLOBIN GLYCOSYLATED A1C: CPT

## 2022-12-05 PROCEDURE — 36415 COLL VENOUS BLD VENIPUNCTURE: CPT

## 2022-12-05 PROCEDURE — 99285 EMERGENCY DEPT VISIT HI MDM: CPT

## 2022-12-05 PROCEDURE — 96365 THER/PROPH/DIAG IV INF INIT: CPT

## 2022-12-05 PROCEDURE — 84132 ASSAY OF SERUM POTASSIUM: CPT

## 2022-12-05 PROCEDURE — 2000000000 HC ICU R&B

## 2022-12-05 PROCEDURE — 93005 ELECTROCARDIOGRAM TRACING: CPT | Performed by: PHYSICIAN ASSISTANT

## 2022-12-05 PROCEDURE — 2580000003 HC RX 258

## 2022-12-05 RX ORDER — ACETAMINOPHEN 325 MG/1
650 TABLET ORAL
Status: CANCELLED | OUTPATIENT
Start: 2022-12-12

## 2022-12-05 RX ORDER — ALBUTEROL SULFATE 90 UG/1
4 AEROSOL, METERED RESPIRATORY (INHALATION) PRN
Status: CANCELLED | OUTPATIENT
Start: 2022-12-12

## 2022-12-05 RX ORDER — SODIUM CHLORIDE 0.9 % (FLUSH) 0.9 %
5-40 SYRINGE (ML) INJECTION PRN
Status: CANCELLED | OUTPATIENT
Start: 2022-12-12

## 2022-12-05 RX ORDER — ONDANSETRON 4 MG/1
4 TABLET, ORALLY DISINTEGRATING ORAL EVERY 8 HOURS PRN
Status: DISCONTINUED | OUTPATIENT
Start: 2022-12-05 | End: 2022-12-07 | Stop reason: HOSPADM

## 2022-12-05 RX ORDER — DOXYCYCLINE HYCLATE 100 MG
100 TABLET ORAL 2 TIMES DAILY
Qty: 42 TABLET | Refills: 0 | Status: ON HOLD | OUTPATIENT
Start: 2022-12-05 | End: 2022-12-07 | Stop reason: SDUPTHER

## 2022-12-05 RX ORDER — HEPARIN SODIUM (PORCINE) LOCK FLUSH IV SOLN 100 UNIT/ML 100 UNIT/ML
500 SOLUTION INTRAVENOUS PRN
Status: CANCELLED | OUTPATIENT
Start: 2022-12-12

## 2022-12-05 RX ORDER — HYDRALAZINE HYDROCHLORIDE 20 MG/ML
5 INJECTION INTRAMUSCULAR; INTRAVENOUS EVERY 4 HOURS PRN
Status: DISCONTINUED | OUTPATIENT
Start: 2022-12-05 | End: 2022-12-06

## 2022-12-05 RX ORDER — INSULIN GLARGINE 100 [IU]/ML
18 INJECTION, SOLUTION SUBCUTANEOUS NIGHTLY
Status: DISCONTINUED | OUTPATIENT
Start: 2022-12-06 | End: 2022-12-07 | Stop reason: HOSPADM

## 2022-12-05 RX ORDER — DOXYCYCLINE HYCLATE 100 MG
100 TABLET ORAL 2 TIMES DAILY
Status: DISCONTINUED | OUTPATIENT
Start: 2022-12-05 | End: 2022-12-06

## 2022-12-05 RX ORDER — ROPINIROLE 1 MG/1
2 TABLET, FILM COATED ORAL NIGHTLY
Status: DISCONTINUED | OUTPATIENT
Start: 2022-12-06 | End: 2022-12-07 | Stop reason: HOSPADM

## 2022-12-05 RX ORDER — SODIUM CHLORIDE 9 MG/ML
5-250 INJECTION, SOLUTION INTRAVENOUS PRN
Status: CANCELLED | OUTPATIENT
Start: 2022-12-12

## 2022-12-05 RX ORDER — INSULIN LISPRO 100 [IU]/ML
0-8 INJECTION, SOLUTION INTRAVENOUS; SUBCUTANEOUS
Status: DISCONTINUED | OUTPATIENT
Start: 2022-12-06 | End: 2022-12-07 | Stop reason: HOSPADM

## 2022-12-05 RX ORDER — SODIUM CHLORIDE 9 MG/ML
5-250 INJECTION, SOLUTION INTRAVENOUS PRN
Status: DISCONTINUED | OUTPATIENT
Start: 2022-12-05 | End: 2022-12-06 | Stop reason: HOSPADM

## 2022-12-05 RX ORDER — SODIUM CHLORIDE 0.9 % (FLUSH) 0.9 %
5-40 SYRINGE (ML) INJECTION PRN
Status: DISCONTINUED | OUTPATIENT
Start: 2022-12-05 | End: 2022-12-06 | Stop reason: HOSPADM

## 2022-12-05 RX ORDER — ENOXAPARIN SODIUM 100 MG/ML
30 INJECTION SUBCUTANEOUS DAILY
Status: DISCONTINUED | OUTPATIENT
Start: 2022-12-06 | End: 2022-12-07 | Stop reason: HOSPADM

## 2022-12-05 RX ORDER — FUROSEMIDE 10 MG/ML
40 INJECTION INTRAMUSCULAR; INTRAVENOUS ONCE
Status: COMPLETED | OUTPATIENT
Start: 2022-12-05 | End: 2022-12-06

## 2022-12-05 RX ORDER — SODIUM CHLORIDE 0.9 % (FLUSH) 0.9 %
5-40 SYRINGE (ML) INJECTION EVERY 12 HOURS SCHEDULED
Status: DISCONTINUED | OUTPATIENT
Start: 2022-12-05 | End: 2022-12-07 | Stop reason: HOSPADM

## 2022-12-05 RX ORDER — SODIUM CHLORIDE 9 MG/ML
INJECTION, SOLUTION INTRAVENOUS PRN
Status: DISCONTINUED | OUTPATIENT
Start: 2022-12-05 | End: 2022-12-05 | Stop reason: SDUPTHER

## 2022-12-05 RX ORDER — INSULIN LISPRO 100 [IU]/ML
0-4 INJECTION, SOLUTION INTRAVENOUS; SUBCUTANEOUS NIGHTLY
Status: DISCONTINUED | OUTPATIENT
Start: 2022-12-05 | End: 2022-12-07 | Stop reason: HOSPADM

## 2022-12-05 RX ORDER — ONDANSETRON 2 MG/ML
4 INJECTION INTRAMUSCULAR; INTRAVENOUS EVERY 6 HOURS PRN
Status: DISCONTINUED | OUTPATIENT
Start: 2022-12-05 | End: 2022-12-07 | Stop reason: HOSPADM

## 2022-12-05 RX ORDER — PANTOPRAZOLE SODIUM 40 MG/1
40 TABLET, DELAYED RELEASE ORAL
Status: DISCONTINUED | OUTPATIENT
Start: 2022-12-06 | End: 2022-12-06

## 2022-12-05 RX ORDER — NICOTINE 21 MG/24HR
1 PATCH, TRANSDERMAL 24 HOURS TRANSDERMAL DAILY
Status: DISCONTINUED | OUTPATIENT
Start: 2022-12-05 | End: 2022-12-07 | Stop reason: HOSPADM

## 2022-12-05 RX ORDER — ACETAMINOPHEN 650 MG/1
650 SUPPOSITORY RECTAL EVERY 6 HOURS PRN
Status: DISCONTINUED | OUTPATIENT
Start: 2022-12-05 | End: 2022-12-07 | Stop reason: HOSPADM

## 2022-12-05 RX ORDER — PREGABALIN 75 MG/1
150 CAPSULE ORAL 2 TIMES DAILY
Status: DISCONTINUED | OUTPATIENT
Start: 2022-12-06 | End: 2022-12-07 | Stop reason: HOSPADM

## 2022-12-05 RX ORDER — SODIUM CHLORIDE 0.9 % (FLUSH) 0.9 %
5-40 SYRINGE (ML) INJECTION PRN
Status: DISCONTINUED | OUTPATIENT
Start: 2022-12-05 | End: 2022-12-07 | Stop reason: HOSPADM

## 2022-12-05 RX ORDER — ACETAMINOPHEN 325 MG/1
650 TABLET ORAL EVERY 6 HOURS PRN
Status: DISCONTINUED | OUTPATIENT
Start: 2022-12-05 | End: 2022-12-07 | Stop reason: HOSPADM

## 2022-12-05 RX ORDER — DIPHENHYDRAMINE HYDROCHLORIDE 50 MG/ML
50 INJECTION INTRAMUSCULAR; INTRAVENOUS
Status: CANCELLED | OUTPATIENT
Start: 2022-12-12

## 2022-12-05 RX ORDER — BUPRENORPHINE AND NALOXONE 8; 2 MG/1; MG/1
1 FILM, SOLUBLE BUCCAL; SUBLINGUAL 2 TIMES DAILY
Status: DISCONTINUED | OUTPATIENT
Start: 2022-12-05 | End: 2022-12-07 | Stop reason: HOSPADM

## 2022-12-05 RX ORDER — DEXTROSE MONOHYDRATE 100 MG/ML
INJECTION, SOLUTION INTRAVENOUS CONTINUOUS PRN
Status: DISCONTINUED | OUTPATIENT
Start: 2022-12-05 | End: 2022-12-07 | Stop reason: HOSPADM

## 2022-12-05 RX ORDER — NIFEDIPINE 30 MG/1
60 TABLET, EXTENDED RELEASE ORAL DAILY
Status: DISCONTINUED | OUTPATIENT
Start: 2022-12-06 | End: 2022-12-06

## 2022-12-05 RX ORDER — ONDANSETRON 2 MG/ML
8 INJECTION INTRAMUSCULAR; INTRAVENOUS
Status: CANCELLED | OUTPATIENT
Start: 2022-12-12

## 2022-12-05 RX ORDER — POLYETHYLENE GLYCOL 3350 17 G/17G
17 POWDER, FOR SOLUTION ORAL DAILY PRN
Status: DISCONTINUED | OUTPATIENT
Start: 2022-12-05 | End: 2022-12-07 | Stop reason: HOSPADM

## 2022-12-05 RX ORDER — DEXTROSE MONOHYDRATE 100 MG/ML
INJECTION, SOLUTION INTRAVENOUS CONTINUOUS PRN
Status: DISCONTINUED | OUTPATIENT
Start: 2022-12-05 | End: 2022-12-05 | Stop reason: HOSPADM

## 2022-12-05 RX ORDER — SODIUM CHLORIDE 9 MG/ML
INJECTION, SOLUTION INTRAVENOUS CONTINUOUS
Status: CANCELLED | OUTPATIENT
Start: 2022-12-12

## 2022-12-05 RX ORDER — TAMSULOSIN HYDROCHLORIDE 0.4 MG/1
0.4 CAPSULE ORAL DAILY
Status: DISCONTINUED | OUTPATIENT
Start: 2022-12-06 | End: 2022-12-07 | Stop reason: HOSPADM

## 2022-12-05 RX ADMIN — ORITAVANCIN: 400 INJECTION, POWDER, LYOPHILIZED, FOR SOLUTION INTRAVENOUS at 13:54

## 2022-12-05 ASSESSMENT — PAIN SCALES - GENERAL: PAINLEVEL_OUTOF10: 0

## 2022-12-05 NOTE — PROGRESS NOTES
K+ resulted; critical high 6.4. Result called to [facility] with Dr. Del [place] Dear. Per [facility], she has been unable to reach the pt on his cell phone or at [address]. [facility] requested to speak to pt; states he needs to go to the ER. Pt on another call upon entering room. [facility] placed on hold while pt finished other call. [facility] hung up before pt was available. This RN called [facility] back; voicemail left. Above d/w pt. Pt attempted to call [facility] at Dr. Rubi Max office. No response. Pt agreeable to wait a bit for Edith to return call.

## 2022-12-05 NOTE — TELEPHONE ENCOUNTER
Agree with ED visit due to high K+    can d/c Ortivanicin weekly infusion   Start oral Doxycycline x 100 mg twice a day x 21 days     Meds sent to his pharmacy  in [place]

## 2022-12-05 NOTE — ED PROVIDER NOTES
[address] [facility]        Pt Name: Severino Edouard  MRN: [id number]  [place] 1970  Date of evaluation: 12/5/2022  Provider: Christian Mcmanus PA-C  PCP: RG Slade CNP  Note Started: 4:58 PM EST 12/5/22          CRISTY. I have evaluated this patient. My supervising physician was available for consultation. I personally saw the patient and independently provided 32 minutes of non-concurrent critical care time out of the total critical care time provided. This excludes time spent doing separately billable procedures. This includes time at the bedside, data interpretation, medication management, obtaining critical history from collateral sources if the patient is unable to provide it directly, and physician consultation. Specifics of interventions taken and potentially life-threatening diagnostic considerations are listed above in the medical decision making. CHIEF COMPLAINT       Chief Complaint   Patient presents with    Abnormal Lab     Pt's K+ 6.4, told to come to ED. Was at infusion center earlier today for IV abx for bone infection. HISTORY OF PRESENT ILLNESS   (Location, Timing/Onset, Context/Setting, Quality, Duration, Modifying Factors, Severity, Associated Signs and Symptoms)  Note limiting factors. Chief Complaint: Hyperkalemia    Severino Edouard is a 47 y.o. male who presents to the emergency department after having some lab work done at the infusion center today and having a potassium of 6.4. Patient is receiving infusion due to osteomyelitis of his left stump. Patient has complex history with multiple areas of infection and required dialysis at [facility] earlier this year. Moved down here 2 months ago from [facility] currently staying at a rehab facility and getting IV antibiotics at infusion center. He was on dialysis for about 1 month earlier this year. Has not seen a nephrologist since then.   He states he had 1 episode of vomiting earlier today but since then has not felt nauseous and a solid good appetite. Denies chest pain, shortness of breath, abdominal pain, decreased urine output, dysuria, hematuria. Has some chronic diarrhea and denies any change in this. Denies bloody stools or any other symptoms. Nursing Notes were all reviewed and agreed with or any disagreements were addressed in the HPI. REVIEW OF SYSTEMS    (2-9 systems for level 4, 10 or more for level 5)     Review of Systems    Positives and Pertinent negatives as per HPI. Except as noted above in the ROS, all other systems were reviewed and negative. PAST MEDICAL HISTORY     Past Medical History:   Diagnosis Date    Anemia of infection and chronic disease     Bipolar 1 disorder ([facility] [address]     Diabetes mellitus ([facility] [address]     Diabetic neuropathy ([facility] [address]     Nicotine dependence     Osteomyelitis, multiple sites ([facility] [address]     Polysubstance abuse ([facility] [address]          SURGICAL HISTORY     Past Surgical History:   Procedure Laterality Date    LEG AMPUTATION BELOW KNEE Bilateral     UPPER GASTROINTESTINAL ENDOSCOPY N/A 11/10/2022    EGD BIOPSY performed by Stephanie Madsen MD at [place]    1 each by Does not apply route 4 times daily    BLOOD GLUCOSE MONITOR STRIPS    Test 4 times a day & as needed for symptoms of irregular blood glucose. Dispense sufficient amount for indicated testing frequency plus additional to accommodate PRN testing needs. BUPRENORPHINE-NALOXONE (SUBOXONE) 8-2 MG SUBL SL TABLET    Place 1 tablet under the tongue 2 times daily.     CONTINUOUS BLOOD GLUC  (FREESTYLE MARCELLA 2 READER) CHICO    1 Device by Does not apply route daily    CONTINUOUS BLOOD GLUC SENSOR (FREESTYLE MARCELLA 2 SENSOR) MISC    1 each by Does not apply route 4 times daily    GLUCOSE MONITORING (FREESTYLE FREEDOM) KIT    1 kit by Does not apply route daily    INSULIN GLARGINE (LANTUS SOLOSTAR) 100 UNIT/ML INJECTION PEN    Inject 18 Units into the skin nightly    INSULIN LISPRO, 1 UNIT DIAL, (HUMALOG KWIKPEN) 100 UNIT/ML SOPN    Inject 8 Units into the skin 3 times daily (before meals)    INSULIN PEN NEEDLE 32G X 5 MM MISC    1 each by Does not apply route daily    LACTOBACILLUS (CULTURELLE) CAPSULE    Take 1 capsule by mouth 2 times daily (with meals)    LANCETS MISC    1 each by Does not apply route 4 times daily    LOPERAMIDE (RA ANTI-DIARRHEAL) 2 MG CAPSULE    Take 1 capsule by mouth 4 times daily as needed for Diarrhea    NIFEDIPINE (PROCARDIA XL) 30 MG EXTENDED RELEASE TABLET    Take 2 tablets by mouth daily    PANTOPRAZOLE (PROTONIX) 40 MG TABLET    Take 1 tablet by mouth 2 times daily (before meals)    PREGABALIN (LYRICA) 150 MG CAPSULE    Take 1 capsule by mouth 2 times daily for 90 days. ROPINIROLE (REQUIP XL) 2 MG EXTENDED RELEASE TABLET    Take 2 mg by mouth nightly    TAMSULOSIN (FLOMAX) 0.4 MG CAPSULE    Take 1 capsule by mouth daily         ALLERGIES     Patient has no known allergies. FAMILYHISTORY     No family history on file. SOCIAL HISTORY       Social History     Tobacco Use    Smoking status: Every Day     Packs/day: 0.50     Years: 40.00     Pack years: 20.00     Types: Cigarettes    Smokeless tobacco: Never   Substance Use Topics    Alcohol use: Not Currently    Drug use: Not Currently       SCREENINGS    W[place] Coma Scale  Eye Opening: Spontaneous  Best Verbal Response: Oriented  Best Motor Response: Obeys commands  Wellington Coma Scale Score: 15        PHYSICAL EXAM    (up to 7 for level 4, 8 or more for level 5)     ED Triage Vitals [12/05/22 1625]   BP Temp Temp Source Heart Rate Resp SpO2 Height Weight   (!) 180/67 97.7 °F (36.5 °C) Oral 76 18 99 % -- 165 lb (74.8 kg)       Physical Exam  Vitals and nursing note reviewed. Constitutional:       Appearance: He is well-developed. He is not diaphoretic.       Comments: Chronically ill-appearing   HENT: Head: Atraumatic. Nose: Nose normal.   Eyes:      General:         Right eye: No discharge. Left eye: No discharge. Cardiovascular:      Rate and Rhythm: Normal rate and regular rhythm. Heart sounds: No murmur heard. No friction rub. No gallop. Pulmonary:      Effort: Pulmonary effort is normal. No respiratory distress. Breath sounds: No stridor. No wheezing, rhonchi or rales. Abdominal:      General: Bowel sounds are normal. There is no distension. Palpations: Abdomen is soft. There is no mass. Tenderness: There is no abdominal tenderness. There is no guarding or rebound. Hernia: No hernia is present. Musculoskeletal:         General: No signs of injury. Cervical back: Normal range of motion. Skin:     General: Skin is warm and dry. Findings: No erythema or rash. Neurological:      Mental Status: He is alert and oriented to person, place, and time. Cranial Nerves: No cranial nerve deficit. Psychiatric:         Behavior: Behavior normal.       DIAGNOSTIC RESULTS   LABS:    Labs Reviewed   COMPREHENSIVE METABOLIC PANEL W/ REFLEX TO MG FOR LOW K - Abnormal; Notable for the following components:       Result Value    Sodium 135 (*)     Potassium reflex Magnesium 6.1 (*)     Glucose 184 (*)     BUN 37 (*)     Albumin 2.9 (*)     Albumin/Globulin Ratio 0.7 (*)     AST 13 (*)     All other components within normal limits    Narrative:     CALL  Herman  [facility] tel. [phone],  Chemistry results called to and read back by Bee Recinos, 12/05/2022  17:36, by [facility]   POCT GLUCOSE   POCT GLUCOSE   POCT GLUCOSE   POCT GLUCOSE   POCT GLUCOSE       When ordered only abnormal lab results are displayed. All other labs were within normal range or not returned as of this dictation. EKG: When ordered, EKG's are interpreted by the Emergency Department Physician in the absence of a cardiologist.  Please see their note for interpretation of EKG.     RADIOLOGY: Non-plain film images such as CT, Ultrasound and MRI are read by the radiologist. Plain radiographic images are visualized and preliminarily interpreted by the ED Provider with the below findings:        Interpretation per the Radiologist below, if available at the time of this note:    No orders to display     No results found. PROCEDURES   Unless otherwise noted below, none     Procedures    CRITICAL CARE TIME       CONSULTS:  IP CONSULT TO HOSPITALIST  IP CONSULT TO NEPHROLOGY      EMERGENCY DEPARTMENT COURSE and DIFFERENTIAL DIAGNOSIS/MDM:   Vitals:    Vitals:    12/05/22 1625   BP: (!) 180/67   Pulse: 76   Resp: 18   Temp: 97.7 °F (36.5 °C)   TempSrc: Oral   SpO2: 99%   Weight: 165 lb (74.8 kg)       Patient was given the following medications:  Medications   insulin regular (HUMULIN R;NOVOLIN R) injection 10 Units (has no administration in time range)     And   dextrose bolus 10% 250 mL (has no administration in time range)   dextrose bolus 10% 125 mL (has no administration in time range)     Or   dextrose bolus 10% 250 mL (has no administration in time range)   glucagon (rDNA) injection 1 mg (has no administration in time range)   dextrose 10 % infusion (has no administration in time range)   sodium zirconium cyclosilicate (LOKELMA) oral suspension 10 g (has no administration in time range)         Is this patient to be included in the SEP-1 Core Measure due to severe sepsis or septic shock? No   Exclusion criteria - the patient is NOT to be included for SEP-1 Core Measure due to: Infection is not suspected    Patient presented with hyperkalemia. He has been having intermittent issues with this. Recheck of his potassium is still 6.1. It was 6.4 at infusion center today. BUN is elevated but creatinine is normal at 1.1. Was started on D50, insulin and Lokelma. Discussed this case with hospitalist and Dr. Mary Carreon with nephrology.   Nephrology has no further recommendations at this time and will follow as an inpatient. Patient was stable at time of admission. FINAL IMPRESSION      1. Hyperkalemia          DISPOSITION/PLAN   DISPOSITION Admitted 12/05/2022 06:02:56 PM      PATIENT REFERRED TO:  No follow-up provider specified.     DISCHARGE MEDICATIONS:  New Prescriptions    DOXYCYCLINE HYCLATE (VIBRA-TABS) 100 MG TABLET    Take 1 tablet by mouth 2 times daily for 21 days       DISCONTINUED MEDICATIONS:  Discontinued Medications    No medications on file              (Please note that portions of this note were completed with a voice recognition program.  Efforts were made to edit the dictations but occasionally words are mis-transcribed.)    Autumn Valdez PA-C (electronically signed)            Autumn Valdez PA-C  12/05/22 8650

## 2022-12-05 NOTE — PROGRESS NOTES
No return call from [facility] with Dr. Harrel Schwab received. Pt agreeable to going to ED, but wants to smoke a cigarette first. Pt advised we could maintain current IV access and escort him directly to ED, or remove IV access and discharge him if he wants to first go out to smoke. Pt states we can remove the IV and he will go to the ED after. IV removed per DIONISIO Brown RN. Pt discharged.

## 2022-12-06 ENCOUNTER — TELEPHONE (OUTPATIENT)
Dept: INFECTIOUS DISEASES | Age: 52
End: 2022-12-06

## 2022-12-06 LAB
ANION GAP SERPL CALCULATED.3IONS-SCNC: 7 MMOL/L (ref 3–16)
ANION GAP SERPL CALCULATED.3IONS-SCNC: 8 MMOL/L (ref 3–16)
BUN BLDV-MCNC: 38 MG/DL (ref 7–20)
BUN BLDV-MCNC: 39 MG/DL (ref 7–20)
CALCIUM SERPL-MCNC: 8.5 MG/DL (ref 8.3–10.6)
CALCIUM SERPL-MCNC: 9 MG/DL (ref 8.3–10.6)
CHLORIDE BLD-SCNC: 107 MMOL/L (ref 99–110)
CHLORIDE BLD-SCNC: 107 MMOL/L (ref 99–110)
CO2: 19 MMOL/L (ref 21–32)
CO2: 22 MMOL/L (ref 21–32)
CREAT SERPL-MCNC: 1.1 MG/DL (ref 0.9–1.3)
CREAT SERPL-MCNC: 1.3 MG/DL (ref 0.9–1.3)
EKG ATRIAL RATE: 73 BPM
EKG DIAGNOSIS: NORMAL
EKG P AXIS: 58 DEGREES
EKG P-R INTERVAL: 234 MS
EKG Q-T INTERVAL: 372 MS
EKG QRS DURATION: 106 MS
EKG QTC CALCULATION (BAZETT): 409 MS
EKG R AXIS: -48 DEGREES
EKG T AXIS: 51 DEGREES
EKG VENTRICULAR RATE: 73 BPM
ESTIMATED AVERAGE GLUCOSE: 131.2 MG/DL
GFR SERPL CREATININE-BSD FRML MDRD: >60 ML/MIN/{1.73_M2}
GFR SERPL CREATININE-BSD FRML MDRD: >60 ML/MIN/{1.73_M2}
GLUCOSE BLD-MCNC: 112 MG/DL (ref 70–99)
GLUCOSE BLD-MCNC: 119 MG/DL (ref 70–99)
GLUCOSE BLD-MCNC: 148 MG/DL (ref 70–99)
GLUCOSE BLD-MCNC: 198 MG/DL (ref 70–99)
GLUCOSE BLD-MCNC: 65 MG/DL (ref 70–99)
GLUCOSE BLD-MCNC: 68 MG/DL (ref 70–99)
GLUCOSE BLD-MCNC: 71 MG/DL (ref 70–99)
GLUCOSE BLD-MCNC: 72 MG/DL (ref 70–99)
GLUCOSE BLD-MCNC: 74 MG/DL (ref 70–99)
GLUCOSE BLD-MCNC: 74 MG/DL (ref 70–99)
GLUCOSE BLD-MCNC: 77 MG/DL (ref 70–99)
GLUCOSE BLD-MCNC: 78 MG/DL (ref 70–99)
GLUCOSE BLD-MCNC: 90 MG/DL (ref 70–99)
GLUCOSE BLD-MCNC: 92 MG/DL (ref 70–99)
HBA1C MFR BLD: 6.2 %
HCT VFR BLD CALC: 30.7 % (ref 40.5–52.5)
HEMOGLOBIN: 10.1 G/DL (ref 13.5–17.5)
MCH RBC QN AUTO: 29.1 PG (ref 26–34)
MCHC RBC AUTO-ENTMCNC: 32.7 G/DL (ref 31–36)
MCV RBC AUTO: 88.9 FL (ref 80–100)
PDW BLD-RTO: 14.8 % (ref 12.4–15.4)
PERFORMED ON: ABNORMAL
PERFORMED ON: NORMAL
PLATELET # BLD: 230 K/UL (ref 135–450)
PMV BLD AUTO: 8.3 FL (ref 5–10.5)
POTASSIUM SERPL-SCNC: 5.5 MMOL/L (ref 3.5–5.1)
POTASSIUM SERPL-SCNC: 6.3 MMOL/L (ref 3.5–5.1)
RBC # BLD: 3.45 M/UL (ref 4.2–5.9)
SODIUM BLD-SCNC: 133 MMOL/L (ref 136–145)
SODIUM BLD-SCNC: 137 MMOL/L (ref 136–145)
WBC # BLD: 6.6 K/UL (ref 4–11)

## 2022-12-06 PROCEDURE — 6360000002 HC RX W HCPCS: Performed by: INTERNAL MEDICINE

## 2022-12-06 PROCEDURE — 2500000003 HC RX 250 WO HCPCS: Performed by: HOSPITALIST

## 2022-12-06 PROCEDURE — 2580000003 HC RX 258: Performed by: HOSPITALIST

## 2022-12-06 PROCEDURE — 2580000003 HC RX 258: Performed by: FAMILY MEDICINE

## 2022-12-06 PROCEDURE — 6370000000 HC RX 637 (ALT 250 FOR IP): Performed by: INTERNAL MEDICINE

## 2022-12-06 PROCEDURE — 6360000002 HC RX W HCPCS: Performed by: FAMILY MEDICINE

## 2022-12-06 PROCEDURE — 2500000003 HC RX 250 WO HCPCS: Performed by: FAMILY MEDICINE

## 2022-12-06 PROCEDURE — 6370000000 HC RX 637 (ALT 250 FOR IP): Performed by: HOSPITALIST

## 2022-12-06 PROCEDURE — 99223 1ST HOSP IP/OBS HIGH 75: CPT | Performed by: INTERNAL MEDICINE

## 2022-12-06 PROCEDURE — 6370000000 HC RX 637 (ALT 250 FOR IP): Performed by: FAMILY MEDICINE

## 2022-12-06 PROCEDURE — 85027 COMPLETE CBC AUTOMATED: CPT

## 2022-12-06 PROCEDURE — 36415 COLL VENOUS BLD VENIPUNCTURE: CPT

## 2022-12-06 PROCEDURE — 80048 BASIC METABOLIC PNL TOTAL CA: CPT

## 2022-12-06 PROCEDURE — 93010 ELECTROCARDIOGRAM REPORT: CPT | Performed by: INTERNAL MEDICINE

## 2022-12-06 PROCEDURE — 2580000003 HC RX 258: Performed by: INTERNAL MEDICINE

## 2022-12-06 PROCEDURE — 6370000000 HC RX 637 (ALT 250 FOR IP): Performed by: PHYSICIAN ASSISTANT

## 2022-12-06 PROCEDURE — 2580000003 HC RX 258: Performed by: PHYSICIAN ASSISTANT

## 2022-12-06 PROCEDURE — 2000000000 HC ICU R&B

## 2022-12-06 RX ORDER — NIFEDIPINE 30 MG/1
30 TABLET, EXTENDED RELEASE ORAL DAILY
Status: DISCONTINUED | OUTPATIENT
Start: 2022-12-07 | End: 2022-12-07 | Stop reason: HOSPADM

## 2022-12-06 RX ORDER — 0.9 % SODIUM CHLORIDE 0.9 %
500 INTRAVENOUS SOLUTION INTRAVENOUS ONCE
Status: COMPLETED | OUTPATIENT
Start: 2022-12-06 | End: 2022-12-06

## 2022-12-06 RX ORDER — FUROSEMIDE 10 MG/ML
20 INJECTION INTRAMUSCULAR; INTRAVENOUS ONCE
Status: COMPLETED | OUTPATIENT
Start: 2022-12-06 | End: 2022-12-06

## 2022-12-06 RX ORDER — DOXYCYCLINE HYCLATE 100 MG
100 TABLET ORAL EVERY 12 HOURS SCHEDULED
Status: DISCONTINUED | OUTPATIENT
Start: 2022-12-06 | End: 2022-12-07 | Stop reason: HOSPADM

## 2022-12-06 RX ADMIN — SODIUM ZIRCONIUM CYCLOSILICATE 10 G: 5 POWDER, FOR SUSPENSION ORAL at 21:15

## 2022-12-06 RX ADMIN — SODIUM CHLORIDE, PRESERVATIVE FREE 10 ML: 5 INJECTION INTRAVENOUS at 00:32

## 2022-12-06 RX ADMIN — BUPRENORPHINE AND NALOXONE 1 FILM: 8; 2 FILM BUCCAL; SUBLINGUAL at 09:36

## 2022-12-06 RX ADMIN — PREGABALIN 150 MG: 75 CAPSULE ORAL at 20:10

## 2022-12-06 RX ADMIN — FUROSEMIDE 20 MG: 10 INJECTION, SOLUTION INTRAMUSCULAR; INTRAVENOUS at 12:20

## 2022-12-06 RX ADMIN — DEXTROSE MONOHYDRATE 250 ML: 100 INJECTION, SOLUTION INTRAVENOUS at 00:02

## 2022-12-06 RX ADMIN — Medication 50 MEQ: at 00:39

## 2022-12-06 RX ADMIN — SODIUM BICARBONATE: 84 INJECTION, SOLUTION INTRAVENOUS at 21:36

## 2022-12-06 RX ADMIN — FUROSEMIDE 40 MG: 10 INJECTION, SOLUTION INTRAMUSCULAR; INTRAVENOUS at 00:32

## 2022-12-06 RX ADMIN — INSULIN GLARGINE 18 UNITS: 100 INJECTION, SOLUTION SUBCUTANEOUS at 00:55

## 2022-12-06 RX ADMIN — SODIUM ZIRCONIUM CYCLOSILICATE 10 G: 5 POWDER, FOR SUSPENSION ORAL at 12:32

## 2022-12-06 RX ADMIN — INSULIN GLARGINE 18 UNITS: 100 INJECTION, SOLUTION SUBCUTANEOUS at 20:08

## 2022-12-06 RX ADMIN — PREGABALIN 150 MG: 75 CAPSULE ORAL at 00:55

## 2022-12-06 RX ADMIN — ENOXAPARIN SODIUM 30 MG: 100 INJECTION SUBCUTANEOUS at 09:32

## 2022-12-06 RX ADMIN — ROPINIROLE HYDROCHLORIDE 2 MG: 1 TABLET, FILM COATED ORAL at 20:10

## 2022-12-06 RX ADMIN — PREGABALIN 150 MG: 75 CAPSULE ORAL at 09:32

## 2022-12-06 RX ADMIN — BUPRENORPHINE AND NALOXONE 1 FILM: 8; 2 FILM BUCCAL; SUBLINGUAL at 20:10

## 2022-12-06 RX ADMIN — DEXTROSE MONOHYDRATE 125 ML: 100 INJECTION, SOLUTION INTRAVENOUS at 03:02

## 2022-12-06 RX ADMIN — INSULIN HUMAN 10 UNITS: 100 INJECTION, SOLUTION PARENTERAL at 00:03

## 2022-12-06 RX ADMIN — SODIUM CHLORIDE, PRESERVATIVE FREE 10 ML: 5 INJECTION INTRAVENOUS at 08:00

## 2022-12-06 RX ADMIN — DOXYCYCLINE HYCLATE 100 MG: 100 TABLET, COATED ORAL at 16:39

## 2022-12-06 RX ADMIN — SODIUM CHLORIDE 500 ML: 9 INJECTION, SOLUTION INTRAVENOUS at 12:20

## 2022-12-06 RX ADMIN — SODIUM CHLORIDE, PRESERVATIVE FREE 10 ML: 5 INJECTION INTRAVENOUS at 20:14

## 2022-12-06 RX ADMIN — TAMSULOSIN HYDROCHLORIDE 0.4 MG: 0.4 CAPSULE ORAL at 09:32

## 2022-12-06 ASSESSMENT — PAIN SCALES - GENERAL: PAINLEVEL_OUTOF10: 0

## 2022-12-06 NOTE — PROGRESS NOTES
Dr. Chung Dumas office called infusion center, states patient is being switched to oral antibiotics and will no longer need weekly IV Kimyrsa. Patient removed from infusion schedule.

## 2022-12-06 NOTE — TELEPHONE ENCOUNTER
Spoke with Nav Anderson at infusion center and advised of orders from Dr. Emily Haque to DC weekly oritavancin and start PO doxycycline.  Noted patient is now admitted to ICU, spoke with patients floor nurse Thea Check and advised fo orders for POC doxycycline and she states she will notify the hospitalist. Office contact information provided

## 2022-12-06 NOTE — CARE COORDINATION
Case Management Assessment  Initial Evaluation    Date/Time of Evaluation: 12/6/2022 3:48 PM  Assessment Completed by: Angelo Lenz RN    If patient is discharged prior to next notation, then this note serves as note for discharge by case management. Patient Name: Kay Carter                   YOB: 1970  Diagnosis: Hyperkalemia [E87.5]                   Date / Time: 12/5/2022  4:46 PM    Patient Admission Status: Inpatient   Readmission Risk (Low < 19, Mod (19-27), High > 27): Readmission Risk Score: 19.2    Current PCP: RG Julio CNP  PCP verified by CM? Yes [facility] Needs)    Chart Reviewed: Yes      History Provided by: Patient  Patient Orientation: Alert and Oriented    Patient Cognition: Alert    Hospitalization in the last 30 days (Readmission):  Yes    If yes, Readmission Assessment in CM Navigator will be completed. Advance Directives:      Code Status: Full Code   Patient's Primary Decision Maker is:        Discharge Planning:    Patient lives with: Other (Comment) (At drug rehab) Type of Home: Homeless  Primary Care Giver: Self  Patient Support Systems include: None   Current Financial resources: Medicaid  Current community resources: Housing, Transportation  Current services prior to admission: Durable Medical Equipment            Current DME: Wheelchair            Type of Home Care services:  Nursing Services    ADLS  Prior functional level: Assistance with the following:, Bathing, Dressing  Current functional level: Assistance with the following:, Bathing, Dressing    PT AM-PAC:   /24  OT AM-PAC:   /24    Family can provide assistance at DC: No  Would you like Case Management to discuss the discharge plan with any other family members/significant others, and if so, who?  No  Plans to Return to Present Housing: Other (see comment) (patient is homeless but was in Drug rehab [address] [facility])  Other Identified Issues/Barriers to RETURNING to current housing: Homeless, quadriplegic, IV Drug use,   Potential Assistance needed at discharge: Jose Elias Lipscomb, Dai (Comment) ([facility]            Potential DME:    Patient expects to discharge to: Behavioral Health/Substance/Detox  Plan for transportation at discharge: Self    Financial    Payor: Normal / Plan: Luster Pradeep / Product Type: *No Product type* /     Does insurance require precert for SNF: No    Potential assistance Purchasing Medications:    Meds-to-Beds request:        Adrienne Brunner [phone] - F [phone]  [address]  [address] [place] [address]  Phone: [phone] Fax: [phone]      Notes:    Factors facilitating achievement of predicted outcomes: Pleasant    Barriers to discharge: No family support, Limited family support, Impaired vision, Medical complications    Additional Case Management Notes: [facility] DC    The Plan for Transition of Care is related to the following treatment goals of Hyperkalemia [N51.8]    IF APPLICABLE: The Patient and/or patient representative Kevin Marrero and his family were provided with a choice of provider and agrees with the discharge plan. Freedom of choice list with basic dialogue that supports the patient's individualized plan of care/goals and shares the quality data associated with the providers was provided to: Patient   Patient Representative Name:       The Patient and/or Patient Representative Agree with the Discharge Plan?  Yes        Electronically signed by Ric Noland RN on 12/6/2022 at 3:50 PM

## 2022-12-06 NOTE — PROGRESS NOTES
Hospitalist Progress Note      PCP: RG Newton - CNP    Date of Admission: 12/5/2022    Chief Complaint: Epigastric pain    Hospital Course: Trace Lyons is a 47 y.o. male with history of polysubstance abuse on Suboxone (heroin and meth, last use 6 weeks ago), current smoker, COPD, IDDM, s/p bilateral BKA with chronic stump ulcerations and left amputation stump osteomyelitis with MRSA, right eye blindness, sent in from the infusion center d/t abnormal labs. He had labs drawn that showed hyperkalemia K 6.4. EKG showed sinus rhythm with first degree block with out Dago Solis  He was given Lokelma, insulin and D 50 & nephrology consulted. Subjective: Patient seen and examined. No fever or chills, abdominal pain, nausea and vomiting.         Medications:  Reviewed    Infusion Medications    dextrose       Scheduled Medications    sodium chloride  500 mL IntraVENous Once    doxycycline hyclate  100 mg Oral 2 times per day    insulin glargine  18 Units SubCUTAneous Nightly    pregabalin  150 mg Oral BID    rOPINIRole  2 mg Oral Nightly    tamsulosin  0.4 mg Oral Daily    buprenorphine-naloxone  1 Film SubLINGual BID    sodium chloride flush  5-40 mL IntraVENous 2 times per day    enoxaparin  30 mg SubCUTAneous Daily    insulin lispro  0-8 Units SubCUTAneous TID WC    insulin lispro  0-4 Units SubCUTAneous Nightly    nicotine  1 patch TransDERmal Daily     PRN Meds: glucose, dextrose bolus **OR** dextrose bolus, glucagon (rDNA), dextrose, sodium chloride flush, ondansetron **OR** ondansetron, polyethylene glycol, acetaminophen **OR** acetaminophen, hydrALAZINE      Intake/Output Summary (Last 24 hours) at 12/6/2022 1300  Last data filed at 12/6/2022 1100  Gross per 24 hour   Intake --   Output 3350 ml   Net -3350 ml       Physical Exam Performed:    BP (!) 149/75   Pulse 73   Temp 97.2 °F (36.2 °C) (Temporal)   Resp 20   Wt 165 lb (74.8 kg)   SpO2 97%     General appearance: Chronically ill looking, appears comfortable. Well nourished  Eyes: Sclera clear, pupils equal  ENT: Moist mucus membranes, no thrush. Trachea midline. Cardiovascular: Regular rhythm, normal S1, S2. No murmur, gallop, rub. No edema in lower extremities  Respiratory: Clear to auscultation bilaterally, no wheeze, good inspiratory effort  Gastrointestinal: Abdomen soft, non-tender, not distended, normal bowel sounds  Musculoskeletal: No cyanosis in digits, neck supple. Bilateral BKA's with superficial stump ulcerations. Neurology: Cranial nerves grossly intact. Alert and oriented in time, place and person. No speech or motor deficits  Psychiatry: Appropriate affect. Not agitated  Skin: Warm, dry, normal turgor, no rash  Brisk capillary refill, peripheral pulses palpable       Labs:   Recent Labs     12/05/22  1342 12/06/22  0454   WBC 7.6 6.6   HGB 11.1* 10.1*   HCT 32.9* 30.7*    230     Recent Labs     12/05/22  1342 12/05/22  1702 12/05/22  2333 12/06/22  0454    135*  --  137   K 6.4* 6.1* 5.9* 5.5*   * 107  --  107   CO2 21 22  --  22   BUN 37* 37*  --  38*   CREATININE 1.0 1.1  --  1.1   CALCIUM 9.2 8.9  --  9.0     Recent Labs     12/05/22  1342 12/05/22  1702   AST 14* 13*   ALT 10 10   BILITOT <0.2 <0.2   ALKPHOS 88 93     No results for input(s): INR in the last 72 hours. No results for input(s): Brady Rose in the last 72 hours. Urinalysis:      Lab Results   Component Value Date/Time    NITRU Negative 11/09/2022 03:36 PM    WBCUA 2 11/09/2022 03:36 PM    BACTERIA None Seen 11/09/2022 03:36 PM    RBCUA 27 11/09/2022 03:36 PM    BLOODU LARGE 11/09/2022 03:36 PM    SPECGRAV 1.015 11/09/2022 03:36 PM    GLUCOSEU 250 11/09/2022 03:36 PM       Radiology:  No orders to display           Assessment/Plan:    Active Hospital Problems    Diagnosis     Hyperkalemia [E87.5]      Priority: Medium     Hyperkalemia:  Refused Lokelma. Given Lasix.   Monitor K level on low potassium diet.      Esophagitis:  s/p EGD 11/10 with findings of reflux esophagitis, Hiatal hernia, ulcer in the fundus of the stomach & Nodular gastritis. Continue PPI BID and repeat EGD in 4 weeks. Left BKA stump osteomyelitis:  Followed by Dr. Darryl Swift. Outpatient weekly IV Kimyrsa discontinued. ID recommending oral Doxycycline 100 mg twice daily for 21 days. HTN: Monitor BP off Lisinopril & nifedipine. T2 DM on Long term Insulin with hyperglycemia:  A1c 5.7% on 11/10/22. Continue Basal insulin with SSI. Polysubstance abuse: Continue Suboxone. Current smoker: Smoking cessation counseling provided. DVT Prophylaxis: Lovenox  Diet: ADULT DIET; Regular; 5 carb choices (75 gm/meal); Low Potassium (Less than 3000 mg/day)  Code Status: Full Code  PT/OT Eval Status: wheel chair bound.     Dispo - once medically stable      Brittanie Esquivel MD

## 2022-12-06 NOTE — H&P
HOSPITALISTS HISTORY AND PHYSICAL    12/5/2022 8:08 PM    Patient Information:  Komal Hart is a 47 y.o. male [id number]  PCP:  RG Cano CNP (Tel: [phone] )    Chief complaint:    Chief Complaint   Patient presents with    Abnormal Lab     Pt's K+ 6.4, told to come to ED. Was at infusion center earlier today for IV abx for bone infection. History of Present Illness:  Radha Sarah is a 47 y.o. male with h/o DM , b/l BKA, Osteomyelitis left stump , MRSA on prolonged antibiotics, was sent from infusion center d/t abnormal labs. He had labs drawn that showed hyperkalemia K 6.4. The pt has chronic diarrhea . Denies abdominal pain or vomiting  EKG showed sinus rhythm with first degree block with out Peaked Twaves  He was given Lokelma, insulin and D50,   Nephrology has been consulted       REVIEW OF SYSTEMS:   Constitutional: Negative for fever,chills or night sweats  ENT: Negative for rhinorrhea, epistaxis, hoarseness, sore throat. Respiratory: Negative for shortness of breath,wheezing  Cardiovascular: Negative for chest pain, palpitations   Gastrointestinal: Negative for nausea, vomiting, +Vediarrhea  Genitourinary: Negative for polyuria, dysuria   Hematologic/Lymphatic: Negative for bleeding tendency, easy bruising  Musculoskeletal: Negative for myalgias and arthralgias  Neurologic: Negative for confusion,dysarthria. Skin: Negative for itching,rash  Psychiatric: Negative for depression,anxiety, agitation. Endocrine: Negative for polydipsia,polyuria,heat /cold intolerance. Past Medical History:   has a past medical history of Anemia of infection and chronic disease, Bipolar 1 disorder ([address] Diabetes mellitus ([address] Diabetic neuropathy ([facility] Nicotine dependence, Osteomyelitis, multiple sites ([address] and Polysubstance abuse ([facility] [address]      Past Surgical History:   has a past surgical history that includes Leg amputation below knee (Bilateral) and Upper gastrointestinal endoscopy (N/A, 11/10/2022). Medications:  Current Facility-Administered Medications on File Prior to Encounter   Medication Dose Route Frequency Provider Last Rate Last Admin    sodium chloride flush 0.9 % injection 5-40 mL  5-40 mL IntraVENous PRN Jose Angel Schwartz MD        0.9 % sodium chloride infusion  5-250 mL/hr IntraVENous PRN Jose Angel Schwartz MD         Current Outpatient Medications on File Prior to Encounter   Medication Sig Dispense Refill    loperamide (RA ANTI-DIARRHEAL) 2 MG capsule Take 1 capsule by mouth 4 times daily as needed for Diarrhea 30 capsule 0    tamsulosin (FLOMAX) 0.4 MG capsule Take 1 capsule by mouth daily 30 capsule 2    pregabalin (LYRICA) 150 MG capsule Take 1 capsule by mouth 2 times daily for 90 days. 60 capsule 2    glucose monitoring (FREESTYLE FREEDOM) kit 1 kit by Does not apply route daily 1 kit 0    Lancets MISC 1 each by Does not apply route 4 times daily 200 each 5    blood glucose monitor strips Test 4 times a day & as needed for symptoms of irregular blood glucose. Dispense sufficient amount for indicated testing frequency plus additional to accommodate PRN testing needs.  200 strip 5    Alcohol Swabs PADS 1 each by Does not apply route 4 times daily 120 each 2    insulin lispro, 1 Unit Dial, (HUMALOG KWIKPEN) 100 UNIT/ML SOPN Inject 8 Units into the skin 3 times daily (before meals) 43.2 mL 0    Continuous Blood Gluc  (FREESTYLE MARCELLA 2 READER) CHICO 1 Device by Does not apply route daily 1 each 0    Continuous Blood Gluc Sensor (FREESTYLE MARCELLA 2 SENSOR) MISC 1 each by Does not apply route 4 times daily 1 each 12    insulin glargine (LANTUS SOLOSTAR) 100 UNIT/ML injection pen Inject 18 Units into the skin nightly 5 Adjustable Dose Pre-filled Pen Syringe 3    Insulin Pen Needle 32G X 5 MM MISC 1 each by Does not apply route daily 100 each 3    lactobacillus (CULTURELLE) capsule Take 1 capsule by mouth 2 times daily (with meals) 60 capsule 0    NIFEdipine (PROCARDIA XL) 30 MG extended release tablet Take 2 tablets by mouth daily 30 tablet 3    pantoprazole (PROTONIX) 40 MG tablet Take 1 tablet by mouth 2 times daily (before meals) 30 tablet 3    buprenorphine-naloxone (SUBOXONE) 8-2 MG SUBL SL tablet Place 1 tablet under the tongue 2 times daily. rOPINIRole (REQUIP XL) 2 MG extended release tablet Take 2 mg by mouth nightly         Allergies:  No Known Allergies     Social History:  Patient Lives    reports that he has been smoking cigarettes. He has a 20.00 pack-year smoking history. He has never used smokeless tobacco. He reports that he does not currently use alcohol. He reports that he does not currently use drugs. Family History:  family history is not on file. ,     Physical Exam:  BP (!) 180/67   Pulse 76   Temp 97.7 °F (36.5 °C) (Oral)   Resp 18   Wt 165 lb (74.8 kg)   SpO2 99%     General appearance:  Appears comfortable. Well nourished  Eyes: Sclera clear, pupils equal  ENT: Moist mucus membranes, no thrush. Trachea midline. Cardiovascular: Regular rhythm, normal S1, S2. No murmur, gallop, rub. No edema in lower extremities  Respiratory: Clear to auscultation bilaterally, no wheeze, good inspiratory effort  Gastrointestinal: Abdomen soft, non-tender, not distended, normal bowel sounds  Musculoskeletal: No cyanosis in digits, neck supple  Neurology: Cranial nerves grossly intact. Alert and oriented in time, place and person. No speech or motor deficits  Psychiatry: Appropriate affect.  Not agitated  Skin: Warm, dry, normal turgor, no rash  Brisk capillary refill, peripheral pulses palpable   Labs:  CBC:   Lab Results   Component Value Date/Time    WBC 7.6 12/05/2022 01:42 PM    RBC 3.70 12/05/2022 01:42 PM    HGB 11.1 12/05/2022 01:42 PM    HCT 32.9 12/05/2022 01:42 PM    MCV 88.9 12/05/2022 01:42 PM    MCH 30.0 12/05/2022 01:42 PM    MCHC 33.8 12/05/2022 01:42 PM RDW 14.7 12/05/2022 01:42 PM     12/05/2022 01:42 PM    MPV 8.2 12/05/2022 01:42 PM     BMP:    Lab Results   Component Value Date/Time     12/05/2022 05:02 PM    K 6.1 12/05/2022 05:02 PM    K 6.4 12/05/2022 01:42 PM     12/05/2022 05:02 PM    CO2 22 12/05/2022 05:02 PM    BUN 37 12/05/2022 05:02 PM    CREATININE 1.1 12/05/2022 05:02 PM    CALCIUM 8.9 12/05/2022 05:02 PM    LABGLOM >60 12/05/2022 05:02 PM    GLUCOSE 184 12/05/2022 05:02 PM     No orders to display     Chest Xray:   EKG:    I visualized CXR images and EKG strips    Discussed case  with     Problem List  Principal Problem:    Hyperkalemia  Resolved Problems:    * No resolved hospital problems. *        Assessment/Plan:   Hyperkalemia K 6.1  Admit with tele   Treat per protocol  Low K diet   Nephrology has been consulted     Chronic osteomyelitis of the left stump  Cont on Doxycycline  Will consult ID ro monitor antibiotic regimen    Smoker nicotine patch offered     DVT prophylaxis   Code status   Diet   IV access   Pizano Catheter    Admit as inpatie. I anticipate hospitalization spanning more than two midnights for investigation and treatment of the above medically necessary diagnoses. Please note that some part of this chart was generated using Dragon dictation software. Although every effort was made to ensure the accuracy of this automated transcription, some errors in transcription may have occurred inadvertently. If you may need any clarification, please do not hesitate to contact me through [facility].        Kendal Amor MD    12/5/2022 8:08 PM

## 2022-12-06 NOTE — CONSULTS
Office : [phone]     Fax :[phone]       Nephrology Consult Note      Patient's Name: Kaitlynn Foster  10:24 AM  12/6/2022    Reason for Consult:  hyperkalemia       Requesting Physician:  Cam Stuart, APRN - CNP      Chief Complaint:    Chief Complaint   Patient presents with    Abnormal Lab     Pt's K+ 6.4, told to come to ED. Was at infusion center earlier today for IV abx for bone infection. History of Present iIlness:    Kaitlynn Foster is a 47 y.o. male with  h/o DM , b/l BKA, Osteomyelitis left stump , MRSA on prolonged antibiotics, was sent from infusion center d/t abnormal labs. He had labs drawn that showed hyperkalemia K 6.4. The pt has chronic diarrhea . Denies abdominal pain or vomiting  EKG showed sinus rhythm with first degree block with out Peaked Twaves  He was given Lokelma, insulin and D50,       I/O last 3 completed shifts:  In: -   Out: 950 [Urine:950]    Past Medical History:   Diagnosis Date    Anemia of infection and chronic disease     Bipolar 1 disorder (HCC)     Diabetes mellitus ([address]     Diabetic neuropathy ([facility]     Nicotine dependence     Osteomyelitis, multiple sites ([facility] [facility]     Polysubstance abuse ([facility] [facility]        Past Surgical History:   Procedure Laterality Date    LEG AMPUTATION BELOW KNEE Bilateral     UPPER GASTROINTESTINAL ENDOSCOPY N/A 11/10/2022    EGD BIOPSY performed by Fermin Chavez MD at [address] [place] [address]       No family history on file. reports that he has been smoking cigarettes. He has a 20.00 pack-year smoking history. He has never used smokeless tobacco. He reports that he does not currently use alcohol. He reports that he does not currently use drugs. Allergies:  Patient has no known allergies.     Current Medications: insulin glargine (LANTUS) injection vial 18 Units, Nightly  pregabalin (LYRICA) capsule 150 mg, BID  rOPINIRole (REQUIP) tablet 2 mg, Nightly  tamsulosin (FLOMAX) capsule 0.4 mg, Daily  buprenorphine-naloxone (SUBOXONE) 8-2 MG SL film 1 Film, BID  glucose-vitamin C chewable tablet 4 tablet, PRN  dextrose bolus 10% 125 mL, PRN   Or  dextrose bolus 10% 250 mL, PRN  glucagon (rDNA) injection 1 mg, PRN  dextrose 10 % infusion, Continuous PRN  sodium chloride flush 0.9 % injection 5-40 mL, 2 times per day  sodium chloride flush 0.9 % injection 5-40 mL, PRN  enoxaparin Sodium (LOVENOX) injection 30 mg, Daily  ondansetron (ZOFRAN-ODT) disintegrating tablet 4 mg, Q8H PRN   Or  ondansetron (ZOFRAN) injection 4 mg, Q6H PRN  polyethylene glycol (GLYCOLAX) packet 17 g, Daily PRN  acetaminophen (TYLENOL) tablet 650 mg, Q6H PRN   Or  acetaminophen (TYLENOL) suppository 650 mg, Q6H PRN  insulin lispro (HUMALOG) injection vial 0-8 Units, TID WC  insulin lispro (HUMALOG) injection vial 0-4 Units, Nightly  hydrALAZINE (APRESOLINE) injection 5 mg, Q4H PRN  nicotine (NICODERM CQ) 14 MG/24HR 1 patch, Daily  sodium zirconium cyclosilicate (LOKELMA) oral suspension 10 g, Once          Physical exam:     Vitals:  BP (!) 147/76   Pulse 70   Temp 97.4 °F (36.3 °C) (Temporal)   Resp 12   Wt 165 lb (74.8 kg)   SpO2 98%   Constitutional:  OAA X3 NAD  Skin: no rash, turgor wnl  Heent:  eomi, mmm  Neck: no bruits or jvd noted  Cardiovascular:  S1, S2 without m/r/g  Respiratory: CTA B without w/r/r  Abdomen:  +bs, soft, nt, nd  Ext: no  lower extremity edema  Psychiatric: mood and affect appropriate  Musculoskeletal:  Rom, muscular strength intact    Labs:  CBC:   Recent Labs     12/05/22  1342 12/06/22  0454   WBC 7.6 6.6   HGB 11.1* 10.1*    230     BMP:    Recent Labs     12/05/22  1342 12/05/22  1702 12/05/22  2333 12/06/22  0454    135*  --  137   K 6.4* 6.1* 5.9* 5.5*   * 107  --  107   CO2 21 22  --  22   BUN 37* 37*  --  38*   CREATININE 1.0 1.1  --  1.1   GLUCOSE 93 184*  --  74     Ca/Mg/Phos:   Recent Labs     12/05/22  1342 12/05/22  1702 12/06/22  0454   CALCIUM 9.2 8.9 9.0     Hepatic:   Recent Labs     12/05/22  1342 12/05/22  1702   AST 14* 13*   ALT 10 10   BILITOT <0.2 <0.2   ALKPHOS 88 93     Troponin: No results for input(s): TROPONINI in the last 72 hours. BNP: No results for input(s): BNP in the last 72 hours. Lipids: No results for input(s): CHOL, TRIG, HDL, LDLCALC, LABVLDL in the last 72 hours. ABGs: No results for input(s): PHART, PO2ART, HKN8XIY in the last 72 hours. INR: No results for input(s): INR in the last 72 hours. UA:No results for input(s): [facility], GLUCOSEU, BILIRUBINUR, [place], BLOODU, PHUR, PROTEINU, UROBILINOGEN, NITRU, LEUKOCYTESUR, LABMICR, URINETYPE in the last 72 hours. Urine Microscopic: No results for input(s): LABCAST, BACTERIA, COMU, HYALCAST, WBCUA, RBCUA, EPIU in the last 72 hours. Urine Culture: No results for input(s): LABURIN in the last 72 hours. Urine Chemistry: No results for input(s): Em Area, PROTEINUR, NAUR in the last 72 hours. IMAGING:  No orders to display         Prior to Admission medications    Medication Sig Start Date End Date Taking? Authorizing Provider   doxycycline hyclate (VIBRA-TABS) 100 MG tablet Take 1 tablet by mouth 2 times daily for 21 days  Patient not taking: Reported on 12/6/2022 12/5/22 12/26/22  Damien Alvarado MD   loperamide (RA ANTI-DIARRHEAL) 2 MG capsule Take 1 capsule by mouth 4 times daily as needed for Diarrhea 11/30/22 12/10/22  Damien Alvarado MD   tamsulosin (FLOMAX) 0.4 MG capsule Take 1 capsule by mouth daily 11/21/22 2/19/23  RG Eduardo - CNP   pregabalin (LYRICA) 150 MG capsule Take 1 capsule by mouth 2 times daily for 90 days.  11/21/22 2/19/23  RG Eduardo CNP   glucose monitoring (FREESTYLE FREEDOM) kit 1 kit by Does not apply route daily 11/21/22   Jlaeel Lara Stairhime, APRN - CNP   Lancets MISC 1 each by Does not apply route 4 times daily 11/21/22   RG Calvillo CNP   blood glucose monitor strips Test 4 times a day & as needed for symptoms of irregular blood glucose. Dispense sufficient amount for indicated testing frequency plus additional to accommodate PRN testing needs. 11/21/22   RG Calvillo CNP   Alcohol Swabs PADS 1 each by Does not apply route 4 times daily 11/21/22 5/20/23  RG Calvillo CNP   insulin lispro, 1 Unit Dial, (HUMALOG KWIKPEN) 100 UNIT/ML SOPN Inject 8 Units into the skin 3 times daily (before meals) 11/16/22 5/15/23  RG Calvillo CNP   Continuous Blood Gluc  (FREESTYLE MARCELLA 2 READER) CHICO 1 Device by Does not apply route daily 11/16/22 5/15/23  RG Calvillo CNP   Continuous Blood Gluc Sensor (FREESTYLE MARCELLA 2 SENSOR) MISC 1 each by Does not apply route 4 times daily 11/16/22 11/11/23  RG Calvillo CNP   insulin glargine (LANTUS SOLOSTAR) 100 UNIT/ML injection pen Inject 18 Units into the skin nightly 11/16/22 2/14/23  RG Calvillo CNP   Insulin Pen Needle 32G X 5 MM MISC 1 each by Does not apply route daily 11/16/22   RG Calvillo CNP   lactobacillus (CULTURELLE) capsule Take 1 capsule by mouth 2 times daily (with meals) 11/13/22 12/13/22  Brittanie Esquivel MD   NIFEdipine (PROCARDIA XL) 30 MG extended release tablet Take 2 tablets by mouth daily  Patient not taking: Reported on 12/6/2022 11/14/22   Brittanie Esquivel MD   pantoprazole (PROTONIX) 40 MG tablet Take 1 tablet by mouth 2 times daily (before meals)  Patient not taking: Reported on 12/6/2022 11/13/22   Brittanie Esquivel MD   buprenorphine-naloxone (SUBOXONE) 8-2 MG SUBL SL tablet Place 1 tablet under the tongue 2 times daily.  9/20/22   Historical Provider, MD   rOPINIRole (REQUIP XL) 2 MG extended release tablet Take 2 mg by mouth nightly    Historical Provider, MD Assessment/Plan :      1. Hyperkalemia     Improving   Give lokelma 10 gm   Stop any potassium supplements     Check potassium level at 6 PM     Low k diet     2.  Chronic osteomyelitis of the left stump  Cont on Doxycycline    Bmp in am         D/w primary team      Thank you for allowing us to participate in care of Severino Edouard         Electronically signed by: Lord Lindsey MD, 12/6/2022, 10:24 AM      [facility] of [address] [place] [address] S  Office : [phone]  Fax :[phone]

## 2022-12-06 NOTE — PROGRESS NOTES
Pt admitted to ICU 3910. Pt AOX4, arrived via his home wheelchair. Denies pain, VSS, SR on monitor, no peaked T-waves. Respirations regular, unlabored. Lung sounds clear. Abdomen rounded, soft, non-tender, no guarding, active bowel sounds. Bilateral BKA noted. Moderate radial pulses. Will administer Lokelma, insulin and D10.

## 2022-12-06 NOTE — PROGRESS NOTES
Pt refusing Radha George saying \"It made me shit last time. I'm nit taking it, I refuse. \" This RN explained that pt's K is critically elevated, pt states that it is always elevated. Pt says \"Whatever they gave me at the other hospital does not make me shit, but it lowers my potassium\". Pt educated about the potential health hazards due to elevated K, pt still refusing. Insulin and D10 given.

## 2022-12-07 VITALS
WEIGHT: 188.71 LBS | TEMPERATURE: 97.2 F | DIASTOLIC BLOOD PRESSURE: 70 MMHG | OXYGEN SATURATION: 97 % | RESPIRATION RATE: 12 BRPM | SYSTOLIC BLOOD PRESSURE: 119 MMHG | HEART RATE: 69 BPM

## 2022-12-07 PROBLEM — E83.42 HYPOMAGNESEMIA: Status: ACTIVE | Noted: 2022-12-07

## 2022-12-07 PROBLEM — E11.59 TYPE 2 DIABETES MELLITUS WITH CIRCULATORY DISORDER, WITH LONG-TERM CURRENT USE OF INSULIN (HCC): Status: ACTIVE | Noted: 2018-12-31

## 2022-12-07 PROBLEM — N17.9 AKI (ACUTE KIDNEY INJURY) (HCC): Status: ACTIVE | Noted: 2022-12-07

## 2022-12-07 LAB
A/G RATIO: 0.7 (ref 1.1–2.2)
ALBUMIN SERPL-MCNC: 2.6 G/DL (ref 3.4–5)
ALP BLD-CCNC: 84 U/L (ref 40–129)
ALT SERPL-CCNC: 7 U/L (ref 10–40)
ANION GAP SERPL CALCULATED.3IONS-SCNC: 7 MMOL/L (ref 3–16)
AST SERPL-CCNC: 13 U/L (ref 15–37)
BILIRUB SERPL-MCNC: <0.2 MG/DL (ref 0–1)
BUN BLDV-MCNC: 42 MG/DL (ref 7–20)
CALCIUM SERPL-MCNC: 8.6 MG/DL (ref 8.3–10.6)
CHLORIDE BLD-SCNC: 105 MMOL/L (ref 99–110)
CO2: 25 MMOL/L (ref 21–32)
CREAT SERPL-MCNC: 1.2 MG/DL (ref 0.9–1.3)
GFR SERPL CREATININE-BSD FRML MDRD: >60 ML/MIN/{1.73_M2}
GLUCOSE BLD-MCNC: 143 MG/DL (ref 70–99)
GLUCOSE BLD-MCNC: 181 MG/DL (ref 70–99)
GLUCOSE BLD-MCNC: 218 MG/DL (ref 70–99)
HCT VFR BLD CALC: 31.5 % (ref 40.5–52.5)
HEMOGLOBIN: 10.5 G/DL (ref 13.5–17.5)
MAGNESIUM: 1.5 MG/DL (ref 1.8–2.4)
MCH RBC QN AUTO: 29.3 PG (ref 26–34)
MCHC RBC AUTO-ENTMCNC: 33.3 G/DL (ref 31–36)
MCV RBC AUTO: 88.2 FL (ref 80–100)
PDW BLD-RTO: 14.4 % (ref 12.4–15.4)
PERFORMED ON: ABNORMAL
PERFORMED ON: ABNORMAL
PHOSPHORUS: 5.3 MG/DL (ref 2.5–4.9)
PLATELET # BLD: 226 K/UL (ref 135–450)
PMV BLD AUTO: 8.3 FL (ref 5–10.5)
POTASSIUM SERPL-SCNC: 5.3 MMOL/L (ref 3.5–5.1)
RBC # BLD: 3.57 M/UL (ref 4.2–5.9)
SODIUM BLD-SCNC: 137 MMOL/L (ref 136–145)
TOTAL PROTEIN: 6.3 G/DL (ref 6.4–8.2)
WBC # BLD: 5.7 K/UL (ref 4–11)

## 2022-12-07 PROCEDURE — 80053 COMPREHEN METABOLIC PANEL: CPT

## 2022-12-07 PROCEDURE — 84100 ASSAY OF PHOSPHORUS: CPT

## 2022-12-07 PROCEDURE — 2500000003 HC RX 250 WO HCPCS: Performed by: HOSPITALIST

## 2022-12-07 PROCEDURE — 99233 SBSQ HOSP IP/OBS HIGH 50: CPT | Performed by: HOSPITALIST

## 2022-12-07 PROCEDURE — 83735 ASSAY OF MAGNESIUM: CPT

## 2022-12-07 PROCEDURE — 2580000003 HC RX 258: Performed by: HOSPITALIST

## 2022-12-07 PROCEDURE — 6370000000 HC RX 637 (ALT 250 FOR IP): Performed by: FAMILY MEDICINE

## 2022-12-07 PROCEDURE — 99254 IP/OBS CNSLTJ NEW/EST MOD 60: CPT | Performed by: INTERNAL MEDICINE

## 2022-12-07 PROCEDURE — 36415 COLL VENOUS BLD VENIPUNCTURE: CPT

## 2022-12-07 PROCEDURE — 6370000000 HC RX 637 (ALT 250 FOR IP): Performed by: HOSPITALIST

## 2022-12-07 PROCEDURE — 85027 COMPLETE CBC AUTOMATED: CPT

## 2022-12-07 PROCEDURE — 2580000003 HC RX 258: Performed by: FAMILY MEDICINE

## 2022-12-07 PROCEDURE — 6360000002 HC RX W HCPCS: Performed by: FAMILY MEDICINE

## 2022-12-07 PROCEDURE — 6360000002 HC RX W HCPCS: Performed by: INTERNAL MEDICINE

## 2022-12-07 PROCEDURE — 6370000000 HC RX 637 (ALT 250 FOR IP): Performed by: INTERNAL MEDICINE

## 2022-12-07 RX ORDER — DOXYCYCLINE HYCLATE 100 MG
100 TABLET ORAL 2 TIMES DAILY
Qty: 42 TABLET | Refills: 0 | Status: SHIPPED | OUTPATIENT
Start: 2022-12-07 | End: 2022-12-28

## 2022-12-07 RX ORDER — PANTOPRAZOLE SODIUM 40 MG/1
40 TABLET, DELAYED RELEASE ORAL
Qty: 30 TABLET | Refills: 3 | Status: SHIPPED | OUTPATIENT
Start: 2022-12-07 | End: 2022-12-13

## 2022-12-07 RX ORDER — NIFEDIPINE 30 MG/1
60 TABLET, EXTENDED RELEASE ORAL DAILY
Qty: 30 TABLET | Refills: 3 | Status: SHIPPED | OUTPATIENT
Start: 2022-12-07

## 2022-12-07 RX ORDER — MAGNESIUM SULFATE IN WATER 40 MG/ML
2000 INJECTION, SOLUTION INTRAVENOUS PRN
Status: DISCONTINUED | OUTPATIENT
Start: 2022-12-07 | End: 2022-12-07

## 2022-12-07 RX ORDER — MAGNESIUM SULFATE IN WATER 40 MG/ML
4000 INJECTION, SOLUTION INTRAVENOUS ONCE
Status: COMPLETED | OUTPATIENT
Start: 2022-12-07 | End: 2022-12-07

## 2022-12-07 RX ADMIN — NIFEDIPINE 30 MG: 30 TABLET, EXTENDED RELEASE ORAL at 08:29

## 2022-12-07 RX ADMIN — INSULIN LISPRO 2 UNITS: 100 INJECTION, SOLUTION INTRAVENOUS; SUBCUTANEOUS at 14:11

## 2022-12-07 RX ADMIN — DOXYCYCLINE HYCLATE 100 MG: 100 TABLET, COATED ORAL at 08:28

## 2022-12-07 RX ADMIN — SODIUM BICARBONATE: 84 INJECTION, SOLUTION INTRAVENOUS at 08:37

## 2022-12-07 RX ADMIN — ENOXAPARIN SODIUM 30 MG: 100 INJECTION SUBCUTANEOUS at 08:27

## 2022-12-07 RX ADMIN — PREGABALIN 150 MG: 75 CAPSULE ORAL at 08:29

## 2022-12-07 RX ADMIN — SODIUM CHLORIDE, PRESERVATIVE FREE 10 ML: 5 INJECTION INTRAVENOUS at 08:26

## 2022-12-07 RX ADMIN — BUPRENORPHINE AND NALOXONE 1 FILM: 8; 2 FILM BUCCAL; SUBLINGUAL at 08:27

## 2022-12-07 RX ADMIN — SODIUM ZIRCONIUM CYCLOSILICATE 10 G: 5 POWDER, FOR SUSPENSION ORAL at 11:06

## 2022-12-07 RX ADMIN — TAMSULOSIN HYDROCHLORIDE 0.4 MG: 0.4 CAPSULE ORAL at 08:29

## 2022-12-07 RX ADMIN — MAGNESIUM SULFATE HEPTAHYDRATE 4000 MG: 40 INJECTION, SOLUTION INTRAVENOUS at 11:05

## 2022-12-07 ASSESSMENT — PAIN SCALES - GENERAL
PAINLEVEL_OUTOF10: 0

## 2022-12-07 NOTE — CARE COORDINATION
Discharge note:      CM/SW has been notified of discharge. Patient noted to have the following needs at discharge. CM/SW has coordinated the following services: will return to [facility]  [address], [address]      Discharge Destination: [facility]  Transportation: [place] provided [facility] transport ([phone])  Time:    will call upon arrival to hospital  Trip #: [phone]    Comment:  Pt has own WCGraciela ([phone]) at [facility] notified of dc time    Transport has been provided unit phone number to call on arrival      All CM/SW needs met, will sign off.

## 2022-12-07 NOTE — CARE COORDINATION
[facility] Wound Ostomy Continence Nurse  Follow-up Progress Note       NAME:  Kay Carter  MEDICAL RECORD NUMBER:  [id number]  AGE:  47 y.o. GENDER:  male  :  1970  TODAY'S DATE:  2022    Subjective:   Wound Identification:  Wound Type: non-healing surgical  Contributing Factors: substance abuse and anxiety (patient picks at wound)        Patient Goal of Care:  [x] Wound Healing  [] Odor Control  [] Palliative Care  [] Pain Control   [] Other:     Objective:    BP (!) 143/71   Pulse 72   Temp 97.2 °F (36.2 °C) (Temporal)   Resp 13   Wt 188 lb 11.4 oz (85.6 kg)   SpO2 97%   Sai Risk Score: Sai Scale Score: 17  Assessment:   Measurements:  Wound 22 Knee Anterior; Left tunelling wound 1 cm deep .03 x.03 (Active)   Wound Image   22 141   Wound Etiology Other 22 141   Dressing/Treatment Dry dressing;Roll gauze;Tape change 22 1416   Wound Length (cm) 0.3 cm 22 1416   Wound Width (cm) 0.3 cm 22 1416   Wound Depth (cm) 1 cm 22 1416   Wound Surface Area (cm^2) 0.09 cm^2 22 1416   Wound Volume (cm^3) 0.09 cm^3 22 1416   Distance Tunneling (cm) 1 cm 22 141   Tunneling Position ___ O'Clock 12 o' clock 22 1416   Drainage Amount Small 22 141   Drainage Description Serosanguinous 22 141   Verónica-wound Assessment Intact 22 141   Margins Defined edges 22 141   Wound Thickness Description not for Pressure Injury Full thickness 22 141   Number of days: 2   Patient in with hyperkalemia, with preexisting wound. Patient agreeable to visit. Patient  is bilateral bka, with a 0.3 cm diameter 1 cm deep wound to the left bka. Patient has been under ID care and was getting iv antibiotics. Patient says he has been told he has osteomyletis to  left bka wound, but he is too anxious to get a closed mri. I informed patient there are open mri facilities and he could try that.   Also he has been to the wound center in the past.  Encouraged patient to go back to the wound center. He was agreeable, but he does not want to lose his place at the rehab center. Wound is pink and surrounding skin is intact. Patient also picks at wound when it is not wrapped. Recommend cleansing with chlorhexidine wipes, cover with a dry dressing, wrap with roll guaze  secure with paper tape. Patient reports he does have wound care supplies at home: iodoform, roll gauze and medipore tape. Response to treatment:  Well tolerated by patient. Pain Assessment:  Severity:  0 / 10  Quality of pain: N/A  Wound Pain Timing/Severity: none  Premedicated: No  Plan:   Plan of Care: Wound 12/05/22 Knee Anterior; Left tunelling wound 1 cm deep .03 x.03-Dressing/Treatment: Dry dressing, Roll gauze, Tape change chlorhexidine wipes to wounds daily  Please follow up with wound care center      Specialty Bed Required : No   [] Low Air Loss   [] Pressure Redistribution  [] Fluid Immersion  [] Bariatric  [] Total Pressure Relief  [] Other:     Current Diet: ADULT DIET; Regular; 5 carb choices (75 gm/meal);  Low Potassium (Less than 3000 mg/day)  Dietician consult:  No    Discharge Plan:  Placement for patient upon discharge: independent living (drug rehab center)   Patient appropriate for Outpatient [address] [facility] [address]: Yes    Referrals:  [x]   [] [address] [place] [address]  [] Supplies  [] Other    Patient/Caregiver Teaching:  Level of patient/caregiver understanding able to:   [] Indicates understanding       [x] Needs reinforcement  [] Unsuccessful      [] Verbal Understanding  [] Demonstrated understanding       [] No evidence of learning  [] Refused teaching         [] N/A       Electronically signed by  GRACIELA Buckley, RN, [address]  [phone] on 12/7/2022 at 2:19 PM

## 2022-12-07 NOTE — DISCHARGE INSTR - COC
Continuity of Care Form    Patient Name: Luis Daniel Herbert   :  1970  MRN:  [id number]    Admit date:  2022  Discharge date:  ***    Code Status Order: Full Code   Advance Directives:     Admitting Physician:  Flory Mullins MD  PCP: Christin Johnston APRN - CNP    Discharging Nurse: [facility] Unit/Room#: ZKG-[id number]/6901-26  Discharging Unit Phone Number: ***    Emergency Contact:   No emergency contact information on file.     Past Surgical History:  Past Surgical History:   Procedure Laterality Date    LEG AMPUTATION BELOW KNEE Bilateral     UPPER GASTROINTESTINAL ENDOSCOPY N/A 11/10/2022    EGD BIOPSY performed by Micha Hsieh MD at [address] ENDOSCOPY       Immunization History:   Immunization History   Administered Date(s) Administered    COVID-19, PFIZER PURPLE top, DILUTE for use, (age 15 y+), 30mcg/0.3mL 2021, 2021    Influenza, FLUCELVAX, (age 10 mo+), MDCK, PF, 0.5mL 10/25/2022    Zoster Recombinant (Shingrix) 10/25/2022       Active Problems:  Patient Active Problem List   Diagnosis Code    Chronic obstructive pulmonary disease ([facility] J44.9    Diabetic polyneuropathy associated with type 2 diabetes mellitus ([address] E11.42    Erectile dysfunction due to diabetes mellitus ([address] E11.69, N52.1    ESRD (end stage renal disease) ([address] N18.6    Failure to thrive in adult R62.7    Type 2 diabetes mellitus with circulatory disorder, with long-term current use of insulin (HCC) E11.59, Z79.4    Below-knee amputation of left lower extremity ([address] T49.827X    Below-knee amputation of right lower extremity ([address] T62.257J    Anemia of infection and chronic disease B99.9, D63.8    Bipolar I disorder in remission ([address] F31.70    Chronic hepatitis C without hepatic coma ([address] B18.2    Other acute osteomyelitis, multiple sites ([address] M86.19    Nicotine dependence, cigarettes, uncomplicated W11.848    Chronic ulcer of right leg with fat layer exposed ([address] L97.912    MRSA infection A49.02 Epigastric pain R10.13    Nausea vomiting and diarrhea R11.2, R19.7    Esophagitis K20.90    Hyponatremia E87.1    HTN (hypertension) I10    Hypophosphatemia E83.39    Osteomyelitis (HCC) M86.9    Elevated troponin R77.8    Current smoker F17.200    Polysubstance abuse (HCC) F19.10    Hyperkalemia E87.5    SHANNAN (acute kidney injury) ([address] N17.9    Hypomagnesemia E83.42       Isolation/Infection:   Isolation            Contact          Patient Infection Status       Infection Onset Added Last Indicated Last Indicated By Review Planned Expiration Resolved Resolved By    MRSA 11/03/22 11/06/22 11/12/22 Culture, Wound Aerobic Only        Resolved    C-diff Rule Out 11/09/22 11/09/22 11/11/22 GI Bacterial Pathogens By PCR (Ordered)   11/11/22 Rule-Out Test Resulted            Nurse Assessment:  Last Vital Signs: BP (!) 189/174   Pulse 77   Temp 97.2 °F (36.2 °C) (Temporal)   Resp 20   Wt 188 lb 11.4 oz (85.6 kg)   SpO2 97%     Last documented pain score (0-10 scale): Pain Level: 0  Last Weight:   Wt Readings from Last 1 Encounters:   12/07/22 188 lb 11.4 oz (85.6 kg)     Mental Status:  {IP PT MENTAL STATUS:11831}    IV Access:  { JACOB IV ACCESS:961807440}    Nursing Mobility/ADLs:  Walking   {P DME LLTN:637742980}  Transfer  {[facility] DME BKPL:910006482}  Bathing  {[facility] DME MBXN:472642810}  Dressing  {P DME BSZH:287985175}  Toileting  {P DME ZISB:052865797}  Feeding  {[facility] DME GTRB:236598739}  Med Admin  {[facility] DME MCMZ:297903615}  Med Delivery   { JACOB MED Delivery:834402523}    Wound Care Documentation and Therapy:        Elimination:  Continence:    Bowel: {YES / ZI:40316}  Bladder: {YES / RB:13750}  Urinary Catheter: {Urinary Catheter:422366944}   Colostomy/Ileostomy/Ileal Conduit: {YES / IM:68540}       Date of Last BM: ***    Intake/Output Summary (Last 24 hours) at 12/7/2022 1243  Last data filed at 12/7/2022 1230  Gross per 24 hour   Intake 10 ml   Output 4650 ml   Net -4640 ml     I/O last 3 completed shifts: In: 10 [I.V.:10]  Out: 6750 [Urine:6750]    Safety Concerns:     [address] Blued Safety Concerns:107448353}    Impairments/Disabilities:      [address] JACOB Impairments/Disabilities:826343566}    Nutrition Therapy:  Current Nutrition Therapy:   [address] JACOB Diet List:648160396}    Routes of Feeding: {CHP DME Other Feedings:990943188}  Liquids: {Slp liquid thickness:96712}  Daily Fluid Restriction: {CHP DME Yes amt example:788303461}  Last Modified Barium Swallow with Video (Video Swallowing Test): {Done Not Done DWNR:554032682}    Treatments at the Time of Hospital Discharge:   Respiratory Treatments: ***  Oxygen Therapy:  {Therapy; copd oxygen:34100}  Ventilator:    {[facility] Vent KDET:072304552}    Rehab Therapies: {THERAPEUTIC INTERVENTION:5112760772}  Weight Bearing Status/Restrictions: [address]  Weight Bearin}  Other Medical Equipment (for information only, NOT a DME order):  {EQUIPMENT:702012814}  Other Treatments: ***    Patient's personal belongings (please select all that are sent with patient):  {CHP DME Belongings:142764238}    RN SIGNATURE:  {Esignature:894860417}    CASE MANAGEMENT/SOCIAL WORK SECTION    Inpatient Status Date: ***    Readmission Risk Assessment Score:  Readmission Risk              Risk of Unplanned Readmission:  32           Discharging to Facility/ Agency   Name:   Address:  Phone:  Fax:    Dialysis Facility (if applicable)   Name:  Address:  Dialysis Schedule:  Phone:  Fax:    / signature: {Esignature:783405918}    PHYSICIAN SECTION    Prognosis: Fair    Condition at Discharge: Stable    Rehab Potential (if transferring to Rehab): Fair    Recommended Labs or Other Treatments After Discharge:   Please Call Dr. Moises Petty office to schedule an appointment for repeat EGD which is past due. Monitor BP on Nifedipine and follow up with PCP for monitoring. Continue Protonix 40 mg BID.   Follow up with infectious Disease Doctor for possible repeat MRI of the stump and orthopedic re-evaluation for stump revision. Resume Doxycycline 100 mg BID for 21 days. Repeat BMP and Magnesium in 1 week. Physician Certification: I certify the above information and transfer of Drucie Saint  is necessary for the continuing treatment of the diagnosis listed and that he requires Drug Rehab for greater 30 days.      Update Admission H&P: No change in H&P    PHYSICIAN SIGNATURE:  Electronically signed by Susan Reyes MD on 12/7/22 at 12:43 PM EST

## 2022-12-07 NOTE — PROGRESS NOTES
Pt complient with care but anxious to leave the hospital.  DC orders in place and SW has communicated with Pt insurance about transportation. Pt informed and preparing for DC.

## 2022-12-07 NOTE — DISCHARGE SUMMARY
[facility]    Patient Demographics    Patient. Terri Fox  Date of Birth. 1970  MRN. [id number]     Primary care provider. RG Tucker CNP  (Tel: [phone])    Admit date: 12/5/2022    Discharge date (blank if same as Note Date): Note Date: 12/7/2022     Reason for Hospitalization. Chief Complaint   Patient presents with    Abnormal Lab     Pt's K+ 6.4, told to come to ED. Was at infusion center earlier today for IV abx for bone infection. Significant Findings. Principal Problem:    Hyperkalemia  Active Problems:    Type 2 diabetes mellitus with circulatory disorder, with long-term current use of insulin (HCC)    MRSA infection    HTN (hypertension)    SHANNAN (acute kidney injury) ([facility] [address]    Hypomagnesemia  Resolved Problems:    * No resolved hospital problems. *       Problems and results from this hospitalization that need follow up. Uncontrolled Hypertension  DM  Bilateral BKA with L stump MRSA infection  SHANNAN with hyperkalemia  Hypomagnesemia    Significant test results and incidental findings. No orders to display     Invasive procedures and treatments. None    [facility]. Terri Fox is a 47 y.o. M with h/o polysubstance abuse on Suboxone (heroin and meth, last use 6 weeks ago), current smoker, COPD, IDDM, s/p bilateral BKA with chronic stump ulcerations and left amputation stump osteomyelitis with MRSA previously on weekly Orbactiv, R eye blindness, sent in from the infusion center d/t abnormal labs. He had labs drawn that showed hyperkalemia with K of 6.4. EKG showed sinus rhythm with first degree block with out Christiana Furrow  He was given Lokelma, insulin and D 50 & nephrology consulted. SHANNAN with Hyperkalemia: improved  Refused Lokelma. Given Lasix & Bicarb drip. Monitor K level on low potassium diet. Need repeat BMP in 1 week.      Esophagitis:  s/p EGD 11/10 with findings of reflux esophagitis, Hiatal hernia, ulcer in the fundus of the stomach & Nodular gastritis. Continue PPI BID. Patient due for repeat EGD as recommended in 4 weeks. Left BKA stump osteomyelitis:  Followed by Dr. Enid Sheth. Outpatient IV Kimyrsa/Orbactiv discontinued. ID recommending oral Doxycycline 100 mg twice daily for 21 days. Inpatient ID consultation recommended repeat MRI and Ortho involvement for possible revision. Patient however requested to be discharged. He has been advised to follow up out patient. Hypomagnesemia: Replaced. HTN: Monitor BP on nifedipine. Needs out patient follow up. T2 DM on Long term Insulin with hyperglycemia:  A1c 5.7% on 11/10/22. Continued Basal insulin with SSI. Polysubstance abuse: Continued Suboxone. Discharged to [facility]. Current smoker: Smoking cessation counseling provided. Consults. IP CONSULT TO HOSPITALIST  IP CONSULT TO NEPHROLOGY  IP CONSULT TO INFECTIOUS DISEASES  IP CONSULT TO NEPHROLOGY  IP CONSULT TO ORTHOPEDIC SURGERY    Physical examination on discharge day. BP (!) 189/174   Pulse 77   Temp 97.2 °F (36.2 °C) (Temporal)   Resp 20   Wt 188 lb 11.4 oz (85.6 kg)   SpO2 97%     General appearance: Chronically ill looking, appears comfortable. Well nourished  Eyes: Sclera clear, pupils equal. Rt eye blindness. ENT: Moist mucus membranes, no thrush. Trachea midline. Cardiovascular: Regular rhythm, normal S1, S2. No murmur, gallop, rub. No edema in lower extremities  Respiratory: Clear to auscultation bilaterally, no wheeze, good inspiratory effort  Gastrointestinal: Abdomen soft, non-tender, not distended, normal bowel sounds  Musculoskeletal: No cyanosis in digits, neck supple. Bilateral BKA's with superficial stump ulcerations. Neurology: Cranial nerves grossly intact. Alert and oriented in time, place and person. No speech or motor deficits  Psychiatry: Appropriate affect.  Not agitated  Skin: Warm, dry, normal turgor, no rash  Brisk capillary refill, peripheral pulses palpable        Condition at time of discharge: Stable    Medication instructions provided to patient at discharge. Medication List        START taking these medications      doxycycline hyclate 100 MG tablet  Commonly known as: VIBRA-TABS  Take 1 tablet by mouth 2 times daily for 21 days     NIFEdipine 30 MG extended release tablet  Commonly known as: PROCARDIA XL  Take 2 tablets by mouth daily     pantoprazole 40 MG tablet  Commonly known as: PROTONIX  Take 1 tablet by mouth 2 times daily (before meals)            CONTINUE taking these medications      Alcohol Swabs Pads  1 each by Does not apply route 4 times daily     blood glucose test strips  Test 4 times a day & as needed for symptoms of irregular blood glucose. Dispense sufficient amount for indicated testing frequency plus additional to accommodate PRN testing needs. buprenorphine-naloxone 8-2 MG Subl SL tablet  Commonly known as: SUBOXONE     FreeStyle Farooq 2 Soddy Daisy Salome  1 Device by Does not apply route daily     FreeStyle Farooq 2 Sensor Misc  1 each by Does not apply route 4 times daily     glucose monitoring kit  1 kit by Does not apply route daily     insulin lispro (1 Unit Dial) 100 UNIT/ML Sopn  Commonly known as: HumaLOG KwikPen  Inject 8 Units into the skin 3 times daily (before meals)     Insulin Pen Needle 32G X 5 MM Misc  1 each by Does not apply route daily     lactobacillus capsule  Take 1 capsule by mouth 2 times daily (with meals)     Lancets Misc  1 each by Does not apply route 4 times daily     Lantus SoloStar 100 UNIT/ML injection pen  Generic drug: insulin glargine  Inject 18 Units into the skin nightly     loperamide 2 MG capsule  Commonly known as: RA Anti-Diarrheal  Take 1 capsule by mouth 4 times daily as needed for Diarrhea     pregabalin 150 MG capsule  Commonly known as: LYRICA  Take 1 capsule by mouth 2 times daily for 90 days.      rOPINIRole 2 MG extended release tablet  Commonly known as: REQUIP XL     tamsulosin 0.4 MG capsule  Commonly known as: FLOMAX  Take 1 capsule by mouth daily               Where to Get Your Medications        These medications were sent to [address] [place] [address] Dr Virgen Brunner [phone] - [phone]  [address] [place] [address], [address] [place] [address]      Phone: [phone]   doxycycline hyclate 100 MG tablet  NIFEdipine 30 MG extended release tablet  pantoprazole 40 MG tablet         Discharge recommendations given to patient. Follow Up. with PCP/Infectious disease in 1 week   Disposition. [facility]   Activity. activity as tolerated  Diet: ADULT DIET; Regular; 5 carb choices (75 gm/meal); Low Potassium (Less than 3000 mg/day)      Spent 35 minutes in discharge process.     Signed:  Alejandro Mendiola MD     12/7/2022 12:44 PM

## 2022-12-07 NOTE — PROGRESS NOTES
Office : [phone]     Fax :[phone]       Nephrology Note      Patient's Name: Gamaliel Guzmán  10:30 AM  12/7/2022    Reason for Consult:  hyperkalemia       Requesting Physician:  RG Cadena CNP      Chief Complaint:    Chief Complaint   Patient presents with    Abnormal Lab     Pt's K+ 6.4, told to come to ED. Was at infusion center earlier today for IV abx for bone infection. History of Present iIlness:    Gamaliel Guzmán is a 47 y.o. male with  h/o DM , b/l BKA, Osteomyelitis left stump , MRSA on prolonged antibiotics, was sent from infusion center d/t abnormal labs. He had labs drawn that showed hyperkalemia K 6.4. The pt has chronic diarrhea . Denies abdominal pain or vomiting  EKG showed sinus rhythm with first degree block with out Peaked Twaves  He was given Lokelma, insulin and D50,       I/O last 3 completed shifts: In: 10 [I.V.:10]  Out: 6750 [Urine:6750]        Interval History    K improving  Serum cr better  Making urine    Past Medical History:   Diagnosis Date    Anemia of infection and chronic disease     Bipolar 1 disorder (HCC)     Diabetes mellitus ([address]     Diabetic neuropathy ([address]     Nicotine dependence     Osteomyelitis, multiple sites ([address]     Polysubstance abuse ([facility] [address]        Past Surgical History:   Procedure Laterality Date    LEG AMPUTATION BELOW KNEE Bilateral     UPPER GASTROINTESTINAL ENDOSCOPY N/A 11/10/2022    EGD BIOPSY performed by Yu Rosado MD at [address]       No family history on file. reports that he has been smoking cigarettes. He has a 20.00 pack-year smoking history. He has never used smokeless tobacco. He reports that he does not currently use alcohol.  He reports that he does not currently use drugs. Allergies:  Patient has no known allergies.     Current Medications:    sodium zirconium cyclosilicate (LOKELMA) oral suspension 10 g, HIN Once  magnesium sulfate 4000 mg in 100 mL IVPB premix, Once  doxycycline hyclate (VIBRA-TABS) tablet 100 mg, 2 times per day  NIFEdipine (PROCARDIA XL) extended release tablet 30 mg, Daily  insulin glargine (LANTUS) injection vial 18 Units, Nightly  pregabalin (LYRICA) capsule 150 mg, BID  rOPINIRole (REQUIP) tablet 2 mg, Nightly  tamsulosin (FLOMAX) capsule 0.4 mg, Daily  buprenorphine-naloxone (SUBOXONE) 8-2 MG SL film 1 Film, BID  glucose-vitamin C chewable tablet 4 tablet, PRN  dextrose bolus 10% 125 mL, PRN   Or  dextrose bolus 10% 250 mL, PRN  glucagon (rDNA) injection 1 mg, PRN  dextrose 10 % infusion, Continuous PRN  sodium chloride flush 0.9 % injection 5-40 mL, 2 times per day  sodium chloride flush 0.9 % injection 5-40 mL, PRN  enoxaparin Sodium (LOVENOX) injection 30 mg, Daily  ondansetron (ZOFRAN-ODT) disintegrating tablet 4 mg, Q8H PRN   Or  ondansetron (ZOFRAN) injection 4 mg, Q6H PRN  polyethylene glycol (GLYCOLAX) packet 17 g, Daily PRN  acetaminophen (TYLENOL) tablet 650 mg, Q6H PRN   Or  acetaminophen (TYLENOL) suppository 650 mg, Q6H PRN  insulin lispro (HUMALOG) injection vial 0-8 Units, TID WC  insulin lispro (HUMALOG) injection vial 0-4 Units, Nightly  nicotine (NICODERM CQ) 14 MG/24HR 1 patch, Daily        Physical exam:     Vitals:  BP (!) 189/174   Pulse 77   Temp 97.1 °F (36.2 °C) (Temporal)   Resp 20   Wt 188 lb 11.4 oz (85.6 kg)   SpO2 96%   Constitutional:  OAA X3 NAD  Skin: no rash, turgor wnl  Heent:  eomi, mmm  Neck: no bruits or jvd noted  Cardiovascular:  S1, S2 without m/r/g  Respiratory: CTA B without w/r/r  Abdomen:  +bs, soft, nt, nd  Ext: no  lower extremity edema  Psychiatric: mood and affect appropriate  Musculoskeletal:  Rom, muscular strength intact    Labs:  CBC:   Recent Labs     12/05/22  1342 12/06/22  0452 12/07/22 0437   WBC 7.6 6.6 5.7   HGB 11.1* 10.1* 10.5*    230 226       BMP:    Recent Labs     12/06/22  0454 12/06/22  1828 12/07/22 0437    133* 137   K 5.5* 6.3* 5.3*    107 105   CO2 22 19* 25   BUN 38* 39* 42*   CREATININE 1.1 1.3 1.2   GLUCOSE 74 119* 143*       Ca/Mg/Phos:   Recent Labs     12/06/22  0454 12/06/22  1828 12/07/22 0437   CALCIUM 9.0 8.5 8.6   MG  --   --  1.50*   PHOS  --   --  5.3*       Hepatic:   Recent Labs     12/05/22  1342 12/05/22  1702 12/07/22 0437   AST 14* 13* 13*   ALT 10 10 7*   BILITOT <0.2 <0.2 <0.2   ALKPHOS 88 93 84       Troponin: No results for input(s): TROPONINI in the last 72 hours. BNP: No results for input(s): BNP in the last 72 hours. Lipids: No results for input(s): CHOL, TRIG, HDL, LDLCALC, LABVLDL in the last 72 hours. ABGs: No results for input(s): PHART, PO2ART, BDO2RZM in the last 72 hours. INR: No results for input(s): INR in the last 72 hours. UA:No results for input(s): Sean Pean, GLUCOSEU, BILIRUBINUR, Phoebe Bolls, BLOODU, PHUR, PROTEINU, UROBILINOGEN, NITRU, LEUKOCYTESUR, LABMICR, URINETYPE in the last 72 hours. Urine Microscopic: No results for input(s): LABCAST, BACTERIA, COMU, HYALCAST, WBCUA, RBCUA, EPIU in the last 72 hours. Urine Culture: No results for input(s): LABURIN in the last 72 hours. Urine Chemistry: No results for input(s): Kevin Necessary, PROTEINUR, NAUR in the last 72 hours. IMAGING:  No orders to display         Prior to Admission medications    Medication Sig Start Date End Date Taking?  Authorizing Provider   doxycycline hyclate (VIBRA-TABS) 100 MG tablet Take 1 tablet by mouth 2 times daily for 21 days  Patient not taking: Reported on 12/6/2022 12/5/22 12/26/22  Jose Son MD   loperamide (RA ANTI-DIARRHEAL) 2 MG capsule Take 1 capsule by mouth 4 times daily as needed for Diarrhea 11/30/22 12/10/22  Jose Son MD   tamsulosin (FLOMAX) 0.4 MG capsule Take 1 capsule by mouth daily 11/21/22 2/19/23  RG Hinds CNP   pregabalin (LYRICA) 150 MG capsule Take 1 capsule by mouth 2 times daily for 90 days. 11/21/22 2/19/23  RG Hinds CNP   glucose monitoring (FREESTYLE FREEDOM) kit 1 kit by Does not apply route daily 11/21/22   RG Hinds CNP   Lancets MISC 1 each by Does not apply route 4 times daily 11/21/22   RG Hinds CNP   blood glucose monitor strips Test 4 times a day & as needed for symptoms of irregular blood glucose. Dispense sufficient amount for indicated testing frequency plus additional to accommodate PRN testing needs.  11/21/22   RG Hinds CNP   Alcohol Swabs PADS 1 each by Does not apply route 4 times daily 11/21/22 5/20/23  RG Hinds CNP   insulin lispro, 1 Unit Dial, (HUMALOG KWIKPEN) 100 UNIT/ML SOPN Inject 8 Units into the skin 3 times daily (before meals) 11/16/22 5/15/23  RG Hinds CNP   Continuous Blood Gluc  (FREESTYLE MARCELLA 2 READER) CHICO 1 Device by Does not apply route daily 11/16/22 5/15/23  RG Hinds CNP   Continuous Blood Gluc Sensor (FREESTYLE MARCELLA 2 SENSOR) MISC 1 each by Does not apply route 4 times daily 11/16/22 11/11/23  RG Hinds CNP   insulin glargine (LANTUS SOLOSTAR) 100 UNIT/ML injection pen Inject 18 Units into the skin nightly 11/16/22 2/14/23  RG Hinds CNP   Insulin Pen Needle 32G X 5 MM MISC 1 each by Does not apply route daily 11/16/22   RG Hinds CNP   lactobacillus (CULTURELLE) capsule Take 1 capsule by mouth 2 times daily (with meals) 11/13/22 12/13/22  Buddy Hernandez MD   NIFEdipine (PROCARDIA XL) 30 MG extended release tablet Take 2 tablets by mouth daily  Patient not taking: Reported on 12/6/2022 11/14/22   Buddy Hernandez MD   pantoprazole (PROTONIX) 40 MG tablet Take 1 tablet by mouth 2 times daily (before meals)  Patient not taking: Reported on 12/6/2022 11/13/22   Cherry Casey MD   buprenorphine-naloxone (SUBOXONE) 8-2 MG SUBL SL tablet Place 1 tablet under the tongue 2 times daily. 9/20/22   Historical Provider, MD   rOPINIRole (REQUIP XL) 2 MG extended release tablet Take 2 mg by mouth nightly    Historical Provider, MD                   Assessment/Plan :      1. Hyperkalemia       Improving    Down to 5.3    Give Lokelma 10 g x 1 dose now    DC Bicarb drip    Low k diet     2.  Chronic osteomyelitis of the left stump  Cont on Doxycycline    Bmp in am       3 Hypomagnesemia    Replace with IV Mg          D/w primary team    Stable renal standpoint      Thank you for allowing us to participate in care of Roseann Brody         Electronically signed by: Guillaume Cook MD, 12/7/2022, 10:30 AM      [facility] [address]  Office : [phone]  Fax :[phone]

## 2022-12-07 NOTE — DISCHARGE INSTRUCTIONS
Please Call Dr. Damon Zheng office to schedule an appointment for repeat EGD which is past due. Monitor BP on Nifedipine and follow up with PCP for monitoring. Continue Protonix 40 mg BID. Follow up with infectious Disease Doctor for possible repeat MRI of the stump and orthopedic re-evaluation for stump revision. Resume Doxycycline 100 mg BID for 21 days. Repeat BMP and Magnesium in 1 week. Please consider  open MRI facility  mri to left bka  Please follow up with wound center for wound care for left below the knee amputation site. Call  [facility] [address]  [address] [facility], [address] [place] [address]  Tel: [phone]  Hours: Wednesday 8AM-4PM  Thursday 8AM-4PM  Friday 8AM-1PM  Saturday Closed  Sunday Closed  Monday 8AM-4PM  Tuesday 8AM-4PM   Wound care for left bka wound: cleanse with saline, pack with iodoform (patient says he has the supplies) cover with dry dressing and roll guaze, secure with paper tape.

## 2022-12-07 NOTE — CONSULTS
Infectious Diseases   Consult Note      Reason for Consult:   chronic OM L BK stump   Requesting Physician:   Dr. Naeem Caballero      Date of Admission: 12/5/2022  Subjective:   CHIEF COMPLAINT:   none given       HPI:    Ama Gresham is a 48yoM with history of polysubstance abuse, DM, COPD, s/p aneta BKA  Hx CDI     History of chronic abscess and OM of the L stump   He has been treated for this since 6/2022 with some disruptions following relocation to the [facility]. He had several doses of oritavancin in summer 2022   Following local ID consult 11/3/22, was started back on po linezolid, cipro and flagyl. He did not tolerate those. Then transitioned back to oritavancin and has had 4 more doses    Has had ongoing diarrhea and admissions for treatment of hyperkalemia. Admitted 12/6/22 with hyperkalemia. Diarrhea better. C diff test 11/11/22 was negative. No fever                   Current abx:  Doxy 100 po BID       Past Surgical History:       Diagnosis Date    Anemia of infection and chronic disease     Bipolar 1 disorder (HCC)     Diabetes mellitus ([facility]     Diabetic neuropathy ([facility]     Nicotine dependence     Osteomyelitis, multiple sites ([facility]     Polysubstance abuse ([facility]          Procedure Laterality Date    LEG AMPUTATION BELOW KNEE Bilateral     UPPER GASTROINTESTINAL ENDOSCOPY N/A 11/10/2022    EGD BIOPSY performed by Aracely Deluna MD at [address], [address] History:    TOBACCO:   reports that he has been smoking cigarettes. He has a 20.00 pack-year smoking history. He has never used smokeless tobacco.  ETOH:   reports that he does not currently use alcohol. Currently in inpatient substance abuse program       Family History:   No family history on file. There is no family history of autoimmune diseases or significant infectious diseases.       Current Medications:    Current Facility-Administered Medications: magnesium sulfate 2000 mg in 50 mL IVPB premix, 2,000 mg, IntraVENous, PRN  sodium zirconium cyclosilicate (LOKELMA) oral suspension 10 g, 10 g, Oral, HIN Once  doxycycline hyclate (VIBRA-TABS) tablet 100 mg, 100 mg, Oral, 2 times per day  NIFEdipine (PROCARDIA XL) extended release tablet 30 mg, 30 mg, Oral, Daily  insulin glargine (LANTUS) injection vial 18 Units, 18 Units, SubCUTAneous, Nightly  pregabalin (LYRICA) capsule 150 mg, 150 mg, Oral, BID  rOPINIRole (REQUIP) tablet 2 mg, 2 mg, Oral, Nightly  tamsulosin (FLOMAX) capsule 0.4 mg, 0.4 mg, Oral, Daily  buprenorphine-naloxone (SUBOXONE) 8-2 MG SL film 1 Film, 1 Film, SubLINGual, BID  glucose-vitamin C chewable tablet 4 tablet, 4 tablet, Oral, PRN  dextrose bolus 10% 125 mL, 125 mL, IntraVENous, PRN **OR** dextrose bolus 10% 250 mL, 250 mL, IntraVENous, PRN  glucagon (rDNA) injection 1 mg, 1 mg, SubCUTAneous, PRN  dextrose 10 % infusion, , IntraVENous, Continuous PRN  sodium chloride flush 0.9 % injection 5-40 mL, 5-40 mL, IntraVENous, 2 times per day  sodium chloride flush 0.9 % injection 5-40 mL, 5-40 mL, IntraVENous, PRN  enoxaparin Sodium (LOVENOX) injection 30 mg, 30 mg, SubCUTAneous, Daily  ondansetron (ZOFRAN-ODT) disintegrating tablet 4 mg, 4 mg, Oral, Q8H PRN **OR** ondansetron (ZOFRAN) injection 4 mg, 4 mg, IntraVENous, Q6H PRN  polyethylene glycol (GLYCOLAX) packet 17 g, 17 g, Oral, Daily PRN  acetaminophen (TYLENOL) tablet 650 mg, 650 mg, Oral, Q6H PRN **OR** acetaminophen (TYLENOL) suppository 650 mg, 650 mg, Rectal, Q6H PRN  insulin lispro (HUMALOG) injection vial 0-8 Units, 0-8 Units, SubCUTAneous, TID WC  insulin lispro (HUMALOG) injection vial 0-4 Units, 0-4 Units, SubCUTAneous, Nightly  nicotine (NICODERM CQ) 14 MG/24HR 1 patch, 1 patch, TransDERmal, Daily      No Known Allergies       REVIEW OF SYSTEMS:    CONSTITUTIONAL:  negative for fevers, chills  EYES:  negative for blurred vision, eye discharge, visual disturbance and icterus  HEENT:  negative for acute hearing loss, tinnitus, ear drainage, sinus redness, warmth, or swelling. No palpable nodules or stigmata of embolic phenomenon  NEUROLOGIC: nonfocal exam  ACCESS:   PIV in place       DATA:    Old records have been reviewed    CBC:  Recent Labs     12/05/22  1342 12/06/22  0454 12/07/22  0437   WBC 7.6 6.6 5.7   RBC 3.70* 3.45* 3.57*   HGB 11.1* 10.1* 10.5*   HCT 32.9* 30.7* 31.5*    230 226   MCV 88.9 88.9 88.2   MCH 30.0 29.1 29.3   MCHC 33.8 32.7 33.3   RDW 14.7 14.8 14.4      BMP:  Recent Labs     12/06/22  0454 12/06/22  1828 12/07/22 0437    133* 137   K 5.5* 6.3* 5.3*    107 105   CO2 22 19* 25   BUN 38* 39* 42*   CREATININE 1.1 1.3 1.2   CALCIUM 9.0 8.5 8.6   GLUCOSE 74 119* 143*        Cultures:   11/12/22 Wound culture rare growth Staph aureus Mrsa   Antibiotic Interpretation Microscan  Method Status    ceFAZolin Resistant >16 mcg/mL BACTERIAL SUSCEPTIBILITY PANEL BY JEN     clindamycin Resistant >4 mcg/mL BACTERIAL SUSCEPTIBILITY PANEL BY JEN     erythromycin Resistant >4 mcg/mL BACTERIAL SUSCEPTIBILITY PANEL BY JEN     oxacillin Resistant >2 mcg/mL BACTERIAL SUSCEPTIBILITY PANEL BY JEN     tetracycline Sensitive <=4 mcg/mL BACTERIAL SUSCEPTIBILITY PANEL BY JEN     trimethoprim-sulfamethoxazole Sensitive <=0.5/9.5 mcg/mL BACTERIAL SUSCEPTIBILITY PANEL BY JEN     vancomycin Sensitive 2 mcg/mL BACTERIAL SUSCEPTIBILITY PANEL BY JEN         Radiology Review:  All pertinent images / reports were reviewed as a part of this visit. MRI L tib fib 6/25/22  Impression      1. Mild edema in the subcutaneous soft tissues of the below the knee amputation stump, which may represent cellulitis. No abscess. 2. Osteomyelitis is suspected in the distal 4.4 cm of the residual left tibia.      Assessment:     Patient Active Problem List   Diagnosis    Chronic obstructive pulmonary disease ([address]    Diabetic polyneuropathy associated with type 2 diabetes mellitus ([address]    Erectile dysfunction due to diabetes mellitus ([address]    ESRD (end stage renal disease) ([address]    Failure to thrive in adult    Type 2 diabetes mellitus with neurologic complication, with long-term current use of insulin (HCC)    Below-knee amputation of left lower extremity (HCC)    Below-knee amputation of right lower extremity (HCC)    Anemia of infection and chronic disease    Bipolar I disorder in remission ([address]    Chronic hepatitis C without hepatic coma (HCC)    Other acute osteomyelitis, multiple sites ([address]    Nicotine dependence, cigarettes, uncomplicated    Chronic ulcer of right leg with fat layer exposed ([address]    MRSA (methicillin resistant Staphylococcus aureus) infection    Epigastric pain    SHANNAN (acute kidney injury) ([address]    Nausea vomiting and diarrhea    Esophagitis    Hyponatremia    Hypertension    Hypophosphatemia    Osteomyelitis (HCC)    Elevated troponin    Current smoker    Polysubstance abuse ([address]    Hyperkalemia       Polysubstance abuse in inpatient treatment program     S/p aneta BKA, R 12/202 and L 2021 - [facility]  He does not intend to return there for continued care     Non-healing L stump wound with chronic OM and chronic draining sinus to bone   This has been refractory to several rounds of IV and IV-equivalent abx therapy     Hyperkalemia     Diarrhea - resolved   History of CDI     NKDA       -needs Ortho involvement and may need revision BK and AKA. -suggested repeating MRI to restage the OM and patient has severe anxiety with MR   -reasonable to continue po doxy for chronic OM to prevent more acute complications, pending any decision on surgical intervention     All above discussed with patient, questions addressed    Can f/u with Dr. Milady M.D. Thank you for the opportunity to participate in the care of your patient.     Please do not hesitate to contact me:   [phone] office Scribe Attestation (For Scribes USE Only)... Attending Attestation (For Attendings USE Only).../Scribe Attestation (For Scribes USE Only)...

## 2022-12-07 NOTE — PROGRESS NOTES
Patient discharged to [facility] via transportation. Trip # confirmed with . Discharge instructions reviewed with patient. Verbalized understanding. Sent home with all belongings.

## 2022-12-07 NOTE — PROGRESS NOTES
Pt insist on being discharged  from the hospital today. He states that he will loose his bed at his rehab center if he doses leave today. Pt needs Mag replacement and complied with medications regimen this morning.

## 2022-12-08 ENCOUNTER — TELEPHONE (OUTPATIENT)
Dept: INFECTIOUS DISEASES | Age: 52
End: 2022-12-08

## 2022-12-08 NOTE — TELEPHONE ENCOUNTER
Attempted to call patient to schedule f/u, unable to leave message on cell phone. Email sent requesting call to this nurse.

## 2022-12-12 ENCOUNTER — HOSPITAL ENCOUNTER (OUTPATIENT)
Dept: ONCOLOGY | Age: 52
Setting detail: INFUSION SERIES
Discharge: HOME | End: 2022-12-12

## 2022-12-12 DIAGNOSIS — S88.112A BELOW-KNEE AMPUTATION OF LEFT LOWER EXTREMITY (HCC): Primary | ICD-10-CM

## 2022-12-12 DIAGNOSIS — M86.662 CHRONIC OSTEOMYELITIS INVOLVING LOWER LEG, LEFT (HCC): ICD-10-CM

## 2022-12-13 ENCOUNTER — OFFICE VISIT (OUTPATIENT)
Dept: PRIMARY CARE CLINIC | Age: 52
End: 2022-12-13
Payer: MEDICAID

## 2022-12-13 VITALS — HEART RATE: 74 BPM | SYSTOLIC BLOOD PRESSURE: 148 MMHG | DIASTOLIC BLOOD PRESSURE: 82 MMHG | OXYGEN SATURATION: 97 %

## 2022-12-13 DIAGNOSIS — E11.42 DIABETIC POLYNEUROPATHY ASSOCIATED WITH TYPE 2 DIABETES MELLITUS (HCC): ICD-10-CM

## 2022-12-13 DIAGNOSIS — R39.198 DIFFICULTY URINATING: ICD-10-CM

## 2022-12-13 DIAGNOSIS — E11.42 TYPE 2 DIABETES MELLITUS WITH DIABETIC POLYNEUROPATHY, WITH LONG-TERM CURRENT USE OF INSULIN (HCC): Primary | ICD-10-CM

## 2022-12-13 DIAGNOSIS — M65.339 TRIGGER MIDDLE FINGER, UNSPECIFIED LATERALITY: ICD-10-CM

## 2022-12-13 DIAGNOSIS — Z79.4 TYPE 2 DIABETES MELLITUS WITH DIABETIC POLYNEUROPATHY, WITH LONG-TERM CURRENT USE OF INSULIN (HCC): Primary | ICD-10-CM

## 2022-12-13 PROBLEM — R77.8 ELEVATED TROPONIN: Status: RESOLVED | Noted: 2022-11-13 | Resolved: 2022-12-13

## 2022-12-13 PROCEDURE — 4004F PT TOBACCO SCREEN RCVD TLK: CPT

## 2022-12-13 PROCEDURE — G8427 DOCREV CUR MEDS BY ELIG CLIN: HCPCS

## 2022-12-13 PROCEDURE — 1111F DSCHRG MED/CURRENT MED MERGE: CPT

## 2022-12-13 PROCEDURE — G8482 FLU IMMUNIZE ORDER/ADMIN: HCPCS

## 2022-12-13 PROCEDURE — 2022F DILAT RTA XM EVC RTNOPTHY: CPT

## 2022-12-13 PROCEDURE — 3017F COLORECTAL CA SCREEN DOC REV: CPT

## 2022-12-13 PROCEDURE — 3044F HG A1C LEVEL LT 7.0%: CPT

## 2022-12-13 PROCEDURE — 3078F DIAST BP <80 MM HG: CPT

## 2022-12-13 PROCEDURE — 99214 OFFICE O/P EST MOD 30 MIN: CPT

## 2022-12-13 PROCEDURE — 3074F SYST BP LT 130 MM HG: CPT

## 2022-12-13 PROCEDURE — G8421 BMI NOT CALCULATED: HCPCS

## 2022-12-13 RX ORDER — PREGABALIN 200 MG/1
200 CAPSULE ORAL 2 TIMES DAILY
Qty: 60 CAPSULE | Refills: 2 | Status: SHIPPED | OUTPATIENT
Start: 2022-12-13 | End: 2023-03-13

## 2022-12-13 RX ORDER — TAMSULOSIN HYDROCHLORIDE 0.4 MG/1
0.8 CAPSULE ORAL DAILY
Qty: 60 CAPSULE | Refills: 2 | Status: SHIPPED | OUTPATIENT
Start: 2022-12-13 | End: 2023-03-13

## 2022-12-13 ASSESSMENT — ENCOUNTER SYMPTOMS
VOMITING: 0
BLOOD IN STOOL: 0
WHEEZING: 0
CHEST TIGHTNESS: 0
COLOR CHANGE: 0
ABDOMINAL PAIN: 0
SHORTNESS OF BREATH: 0
NAUSEA: 0
DIARRHEA: 0
COUGH: 0

## 2022-12-13 ASSESSMENT — PATIENT HEALTH QUESTIONNAIRE - PHQ9
3. TROUBLE FALLING OR STAYING ASLEEP: 1
SUM OF ALL RESPONSES TO PHQ9 QUESTIONS 1 & 2: 1
1. LITTLE INTEREST OR PLEASURE IN DOING THINGS: 0
SUM OF ALL RESPONSES TO PHQ QUESTIONS 1-9: 5
10. IF YOU CHECKED OFF ANY PROBLEMS, HOW DIFFICULT HAVE THESE PROBLEMS MADE IT FOR YOU TO DO YOUR WORK, TAKE CARE OF THINGS AT HOME, OR GET ALONG WITH OTHER PEOPLE: 0
6. FEELING BAD ABOUT YOURSELF - OR THAT YOU ARE A FAILURE OR HAVE LET YOURSELF OR YOUR FAMILY DOWN: 1
SUM OF ALL RESPONSES TO PHQ QUESTIONS 1-9: 5
7. TROUBLE CONCENTRATING ON THINGS, SUCH AS READING THE NEWSPAPER OR WATCHING TELEVISION: 0
8. MOVING OR SPEAKING SO SLOWLY THAT OTHER PEOPLE COULD HAVE NOTICED. OR THE OPPOSITE, BEING SO FIGETY OR RESTLESS THAT YOU HAVE BEEN MOVING AROUND A LOT MORE THAN USUAL: 0
9. THOUGHTS THAT YOU WOULD BE BETTER OFF DEAD, OR OF HURTING YOURSELF: 0
4. FEELING TIRED OR HAVING LITTLE ENERGY: 1
2. FEELING DOWN, DEPRESSED OR HOPELESS: 1
5. POOR APPETITE OR OVEREATING: 1

## 2022-12-13 NOTE — PROGRESS NOTES
Placed Ortho consult   Suggest he talk to Ortho first before Vascular     See my consult noted dated 12/7/22 for details

## 2022-12-13 NOTE — ASSESSMENT & PLAN NOTE
Increase Lyrica to 200mg BID this date, f/u in office if still no improvement or worsens. Diabetes control good, most recent A1c 6.2, continue current medications as is.

## 2022-12-13 NOTE — PROGRESS NOTES
Ruslan Lott (:  1970) is a 47 y.o. male,Established patient, here for evaluation of the following chief complaint(s):  Diarrhea (Pt states the diarrhea is better with the medication), Trigger finger (Both middle fingers lock with the left being the worse), Arthritis (C/o his arthritis is getting worse), and Medication Adjustment (Pt would like to discuss increasing Flomax, lyrica)      HPI  Patient presents for diabetes f/u as well as for a slew of other concerns:  Patient states having bothersome trigger finger t bilateral middle fingers - has been present for several years. Patient states he has tried medication for this in the past with no relief. Patient also reports worsening pains, both arthritic pains as well as neuropathic pains. Patient is requesting increase to Lyrica this date. Additionally, patient states Flomax is not providing relief to his urinary difficulty, he states it takes him \"a long time to get going in the morning. \"     Diabetes is better controlled, most recent A1c from 12/10 6.2. Continue current medications as prescribed. Patient to have revision to left BKA r/t infection - states abx were not helping and he may require AKA soon. ASSESSMENT/PLAN:  1. Type 2 diabetes mellitus with diabetic polyneuropathy, with long-term current use of insulin ([facility]  2. Diabetic polyneuropathy associated with type 2 diabetes mellitus (HCC)  Assessment & Plan:  Increase Lyrica to 200mg BID this date, f/u in office if still no improvement or worsens. Diabetes control good, most recent A1c 6.2, continue current medications as is. Orders:  -     pregabalin (LYRICA) 200 MG capsule; Take 1 capsule by mouth 2 times daily for 90 days. , Disp-60 capsule, R-2Normal  3. Trigger middle finger, unspecified laterality  Assessment & Plan:  Bilateral sides - ref to orthopedics provided for possible tendon release procedure.    Orders:  -     [address], [place] Trejo, Orthopedic Surgery, [place] - Seun  4. Difficulty urinating  Assessment & Plan:  Increase Flomax to 0.8mg daily, PSA level ordered - will f/u with result. Orders:  -     tamsulosin (FLOMAX) 0.4 MG capsule; Take 2 capsules by mouth daily, Disp-60 capsule, R-2Normal  -     PSA, Prostatic Specific Antigen; Future       BP (!) 148/82   Pulse 74   SpO2 97%      SUBJECTIVE/OBJECTIVE:  Review of Systems   Constitutional:  Negative for fatigue, fever and unexpected weight change. Respiratory:  Negative for cough, chest tightness, shortness of breath and wheezing. Cardiovascular:  Negative for chest pain, palpitations and leg swelling. Gastrointestinal:  Negative for abdominal pain, blood in stool, diarrhea, nausea and vomiting. Genitourinary:  Positive for difficulty urinating. Musculoskeletal:  Positive for arthralgias (Arthritic pains, as well as worsening neuropathic pains) and myalgias. Gait problem: Bilateral BKA, pt WC bound. Skin:  Negative for color change and rash. Neurological:  Negative for dizziness, weakness and light-headedness. All other systems reviewed and are negative. Physical Exam  Vitals and nursing note reviewed. Constitutional:       General: He is not in acute distress. Appearance: Normal appearance. Cardiovascular:      Rate and Rhythm: Normal rate and regular rhythm. Heart sounds: Normal heart sounds. Pulmonary:      Effort: Pulmonary effort is normal.      Breath sounds: Normal breath sounds. Skin:     General: Skin is warm and dry. Neurological:      Mental Status: He is alert and oriented to person, place, and time. Mental status is at baseline. Psychiatric:         Mood and Affect: Mood normal.         Behavior: Behavior normal.         Thought Content: Thought content normal.         Judgment: Judgment normal.       Current Outpatient Medications   Medication Sig Dispense Refill    pregabalin (LYRICA) 200 MG capsule Take 1 capsule by mouth 2 times daily for 90 days.  61 capsule 2    tamsulosin (FLOMAX) 0.4 MG capsule Take 2 capsules by mouth daily 60 capsule 2    doxycycline hyclate (VIBRA-TABS) 100 MG tablet Take 1 tablet by mouth 2 times daily for 21 days 42 tablet 0    NIFEdipine (PROCARDIA XL) 30 MG extended release tablet Take 2 tablets by mouth daily 30 tablet 3    glucose monitoring (FREESTYLE FREEDOM) kit 1 kit by Does not apply route daily 1 kit 0    Lancets MISC 1 each by Does not apply route 4 times daily 200 each 5    blood glucose monitor strips Test 4 times a day & as needed for symptoms of irregular blood glucose. Dispense sufficient amount for indicated testing frequency plus additional to accommodate PRN testing needs. 200 strip 5    Alcohol Swabs PADS 1 each by Does not apply route 4 times daily 120 each 2    insulin lispro, 1 Unit Dial, (HUMALOG KWIKPEN) 100 UNIT/ML SOPN Inject 8 Units into the skin 3 times daily (before meals) 43.2 mL 0    Continuous Blood Gluc  (FREESTYLE MARCELLA 2 READER) CHICO 1 Device by Does not apply route daily 1 each 0    Continuous Blood Gluc Sensor (FREESTYLE MARCELLA 2 SENSOR) MISC 1 each by Does not apply route 4 times daily 1 each 12    insulin glargine (LANTUS SOLOSTAR) 100 UNIT/ML injection pen Inject 18 Units into the skin nightly 5 Adjustable Dose Pre-filled Pen Syringe 3    Insulin Pen Needle 32G X 5 MM MISC 1 each by Does not apply route daily 100 each 3    lactobacillus (CULTURELLE) capsule Take 1 capsule by mouth 2 times daily (with meals) 60 capsule 0    buprenorphine-naloxone (SUBOXONE) 8-2 MG SUBL SL tablet Place 1 tablet under the tongue 2 times daily. rOPINIRole (REQUIP XL) 2 MG extended release tablet Take 2 mg by mouth nightly       No current facility-administered medications for this visit. Return in about 3 months (around 3/13/2023) for HTN, diabetes.     Electronically signed by Emily Hatchet, APRN - CNP on 12/13/2022 at 3:17 PM

## 2022-12-27 ENCOUNTER — OFFICE VISIT (OUTPATIENT)
Dept: VASCULAR SURGERY | Age: 52
End: 2022-12-27
Payer: MEDICAID

## 2022-12-27 VITALS
SYSTOLIC BLOOD PRESSURE: 142 MMHG | DIASTOLIC BLOOD PRESSURE: 80 MMHG | WEIGHT: 170 LBS | BODY MASS INDEX: 29.02 KG/M2 | HEIGHT: 64 IN

## 2022-12-27 DIAGNOSIS — M86.19 OTHER ACUTE OSTEOMYELITIS, MULTIPLE SITES (HCC): Primary | ICD-10-CM

## 2022-12-27 PROCEDURE — G8419 CALC BMI OUT NRM PARAM NOF/U: HCPCS | Performed by: SURGERY

## 2022-12-27 PROCEDURE — 4004F PT TOBACCO SCREEN RCVD TLK: CPT | Performed by: SURGERY

## 2022-12-27 PROCEDURE — G8427 DOCREV CUR MEDS BY ELIG CLIN: HCPCS | Performed by: SURGERY

## 2022-12-27 PROCEDURE — 99204 OFFICE O/P NEW MOD 45 MIN: CPT | Performed by: SURGERY

## 2022-12-27 PROCEDURE — 3078F DIAST BP <80 MM HG: CPT | Performed by: SURGERY

## 2022-12-27 PROCEDURE — 3017F COLORECTAL CA SCREEN DOC REV: CPT | Performed by: SURGERY

## 2022-12-27 PROCEDURE — 3074F SYST BP LT 130 MM HG: CPT | Performed by: SURGERY

## 2022-12-27 PROCEDURE — 1111F DSCHRG MED/CURRENT MED MERGE: CPT | Performed by: SURGERY

## 2022-12-27 PROCEDURE — G8482 FLU IMMUNIZE ORDER/ADMIN: HCPCS | Performed by: SURGERY

## 2022-12-27 NOTE — PROGRESS NOTES
[facility]  Consultation Note    Chief Complaint / Reason for Consultation  Osteo bka    History of Present Illness  Patient is a 47 y.o. male with history of diabetes, substance abuse, diabetic neuropathy referred today by Dr. Chester Sierra for consideration of revision amputation. Patient has a history of a right below-knee amputation in December 2021 and left below-knee amputation in Jan 2022. He has a nonhealing wound of his left stump with evidence of chronic osteomyelitis in his here today to consider revision BKA or possible AKA. He is currently in a rehab facility for drug abuse. He has been clean for 3 months. He has not walked with a prosthetic. Review of Systems     Denies fevers, chills, chest pain, shortness of breath, nausea, vomiting, hematemesis, diarrhea, constipation, melena, hematochezia, wt changes, vision problems, blindness, hearing problems, facial droop, slurred speech, extremity weakness, extremity numbness, dysuria. Past Medical History:   has a past medical history of Anemia of infection and chronic disease, Bipolar 1 disorder ([address] Diabetes mellitus ([address] Diabetic neuropathy ([facility] Nicotine dependence, Osteomyelitis, multiple sites ([facility] and Polysubstance abuse ([facility] Past Surgical History:   has a past surgical history that includes Leg amputation below knee (Bilateral) and Upper gastrointestinal endoscopy (N/A, 11/10/2022). Medications:  Current Outpatient Medications on File Prior to Visit   Medication Sig Dispense Refill    pregabalin (LYRICA) 200 MG capsule Take 1 capsule by mouth 2 times daily for 90 days.  60 capsule 2    tamsulosin (FLOMAX) 0.4 MG capsule Take 2 capsules by mouth daily 60 capsule 2    doxycycline hyclate (VIBRA-TABS) 100 MG tablet Take 1 tablet by mouth 2 times daily for 21 days 42 tablet 0    NIFEdipine (PROCARDIA XL) 30 MG extended release tablet Take 2 tablets by mouth daily 30 tablet 3    glucose monitoring (FREESTYLE FREEDOM) kit 1 kit by Does not apply route daily 1 kit 0    Lancets MISC 1 each by Does not apply route 4 times daily 200 each 5    blood glucose monitor strips Test 4 times a day & as needed for symptoms of irregular blood glucose. Dispense sufficient amount for indicated testing frequency plus additional to accommodate PRN testing needs. 200 strip 5    Alcohol Swabs PADS 1 each by Does not apply route 4 times daily 120 each 2    insulin lispro, 1 Unit Dial, (HUMALOG KWIKPEN) 100 UNIT/ML SOPN Inject 8 Units into the skin 3 times daily (before meals) 43.2 mL 0    Continuous Blood Gluc  (FREESTYLE MARCELLA 2 READER) CHICO 1 Device by Does not apply route daily 1 each 0    Continuous Blood Gluc Sensor (FREESTYLE MARCELLA 2 SENSOR) MISC 1 each by Does not apply route 4 times daily 1 each 12    insulin glargine (LANTUS SOLOSTAR) 100 UNIT/ML injection pen Inject 18 Units into the skin nightly 5 Adjustable Dose Pre-filled Pen Syringe 3    Insulin Pen Needle 32G X 5 MM MISC 1 each by Does not apply route daily 100 each 3    buprenorphine-naloxone (SUBOXONE) 8-2 MG SUBL SL tablet Place 1 tablet under the tongue 2 times daily. rOPINIRole (REQUIP XL) 2 MG extended release tablet Take 2 mg by mouth nightly       No current facility-administered medications on file prior to visit. Allergies:  No Known Allergies     Social History:   reports that he has been smoking cigarettes. He has a 20.00 pack-year smoking history. He has never used smokeless tobacco. He reports that he does not currently use alcohol. He reports that he does not currently use drugs. Family History:  family history is not on file. Vital Signs  There were no vitals filed for this visit.     Physical Examination  General:  no apparent distress  Psychiatric: affect appropriate  Head/Eyes/Ears/Nose/Throat:  Atraumatic, vision and hearing intact, face symmetric  Neck:  supple  Chest/Lungs: clear to auscultation bilaterally  Cardiac: Regular rate and rhythm  Abdomen: soft, nontender  Extremities: Left below-knee amputation with lateral wound. Approximately 1 cm. Well-healed right below-knee amputation    Labs  Lab Results   Component Value Date/Time    WBC 5.7 12/07/2022 04:37 AM    HGB 10.5 12/07/2022 04:37 AM    HCT 31.5 12/07/2022 04:37 AM    MCV 88.2 12/07/2022 04:37 AM     12/07/2022 04:37 AM     Lab Results   Component Value Date/Time     12/07/2022 04:37 AM    K 5.3 12/07/2022 04:37 AM    K 6.1 12/05/2022 05:02 PM     12/07/2022 04:37 AM    CO2 25 12/07/2022 04:37 AM    PHOS 5.3 12/07/2022 04:37 AM    BUN 42 12/07/2022 04:37 AM    CREATININE 1.2 12/07/2022 04:37 AM      No results found for: GLU    Imaging: Outside hospital MRI from June 2022 reviewed showing osteomyelitis of the distal 4.4 cm of the tibia    No orders to display     No results found. Assessment:   Left tibia osteomyelitis  Drug abuse  Diabetes      Plan:  50-year-old male with osteomyelitis of the distal 4.4 cm of the left tibia. He is on chronic suppressive antibiotics. I do not see any options for revision of his BKA and would recommend conversion to a left above-knee amputation. Would recommend inpatient consultation for rehab. All risks of the surgery discussed in detail. All questions answered. Steve Cerda M.D., FACS.  12/27/2022  10:55 AM

## 2022-12-29 ENCOUNTER — PREP FOR PROCEDURE (OUTPATIENT)
Dept: VASCULAR SURGERY | Age: 52
End: 2022-12-29

## 2022-12-29 DIAGNOSIS — M86.19 OTHER ACUTE OSTEOMYELITIS, MULTIPLE SITES (HCC): ICD-10-CM

## 2022-12-29 DIAGNOSIS — Z01.818 PRE-OP TESTING: Primary | ICD-10-CM

## 2022-12-29 RX ORDER — SODIUM CHLORIDE 0.9 % (FLUSH) 0.9 %
5-40 SYRINGE (ML) INJECTION PRN
Status: CANCELLED | OUTPATIENT
Start: 2022-12-29

## 2022-12-29 RX ORDER — SODIUM CHLORIDE 0.9 % (FLUSH) 0.9 %
5-40 SYRINGE (ML) INJECTION EVERY 12 HOURS SCHEDULED
Status: CANCELLED | OUTPATIENT
Start: 2022-12-29

## 2022-12-29 RX ORDER — SODIUM CHLORIDE 9 MG/ML
INJECTION, SOLUTION INTRAVENOUS PRN
Status: CANCELLED | OUTPATIENT
Start: 2022-12-29

## 2023-01-06 RX ORDER — SODIUM CHLORIDE 0.9 % (FLUSH) 0.9 %
5-40 SYRINGE (ML) INJECTION EVERY 12 HOURS SCHEDULED
Status: CANCELLED | OUTPATIENT
Start: 2023-01-06

## 2023-01-06 RX ORDER — SODIUM CHLORIDE 9 MG/ML
INJECTION, SOLUTION INTRAVENOUS PRN
Status: CANCELLED | OUTPATIENT
Start: 2023-01-06

## 2023-01-06 RX ORDER — SODIUM CHLORIDE 9 MG/ML
INJECTION, SOLUTION INTRAVENOUS CONTINUOUS
Status: CANCELLED | OUTPATIENT
Start: 2023-01-06

## 2023-01-06 RX ORDER — SODIUM CHLORIDE 0.9 % (FLUSH) 0.9 %
5-40 SYRINGE (ML) INJECTION PRN
Status: CANCELLED | OUTPATIENT
Start: 2023-01-06

## 2023-01-23 DIAGNOSIS — E11.42 TYPE 2 DIABETES MELLITUS WITH DIABETIC POLYNEUROPATHY, WITH LONG-TERM CURRENT USE OF INSULIN (HCC): ICD-10-CM

## 2023-01-23 DIAGNOSIS — R39.198 DIFFICULTY URINATING: ICD-10-CM

## 2023-01-23 DIAGNOSIS — Z79.4 TYPE 2 DIABETES MELLITUS WITH DIABETIC POLYNEUROPATHY, WITH LONG-TERM CURRENT USE OF INSULIN (HCC): ICD-10-CM

## 2023-01-23 NOTE — TELEPHONE ENCOUNTER
Rx refill request:tamsulosin (FLOMAX) 0.4 MG capsule  #60    lamoTRIgine (LAMICTAL) 25 MG tablet  Lancets MISC  #200 each   insulin lispro, 1 Unit Dial, (HUMALOG KWIKPEN) 100 UNIT/ML SOPN  #43.2 ML  Insulin Pen Needle 32G X 5 MM MISC  #100 each   insulin glargine (LANTUS SOLOSTAR) 100 UNIT/ML injection pen  #5 Adjustable Dose Pre-Filled Pen Syringe     Pharmacy: [facility]

## 2023-01-24 RX ORDER — LAMOTRIGINE 25 MG/1
25 TABLET ORAL DAILY
Qty: 30 TABLET | OUTPATIENT
Start: 2023-01-24

## 2023-01-24 RX ORDER — TAMSULOSIN HYDROCHLORIDE 0.4 MG/1
0.8 CAPSULE ORAL DAILY
Qty: 60 CAPSULE | Refills: 2 | Status: SHIPPED | OUTPATIENT
Start: 2023-01-24 | End: 2023-04-24

## 2023-01-24 RX ORDER — INSULIN GLARGINE 100 [IU]/ML
18 INJECTION, SOLUTION SUBCUTANEOUS NIGHTLY
Qty: 5 ADJUSTABLE DOSE PRE-FILLED PEN SYRINGE | Refills: 3 | Status: SHIPPED | OUTPATIENT
Start: 2023-01-24 | End: 2023-04-24

## 2023-01-24 RX ORDER — LANCETS 30 GAUGE
1 EACH MISCELLANEOUS 4 TIMES DAILY
Qty: 200 EACH | Refills: 5 | Status: SHIPPED | OUTPATIENT
Start: 2023-01-24

## 2023-01-24 RX ORDER — INSULIN LISPRO 100 [IU]/ML
8 INJECTION, SOLUTION INTRAVENOUS; SUBCUTANEOUS
Qty: 43.2 ML | Refills: 0 | Status: SHIPPED | OUTPATIENT
Start: 2023-01-24 | End: 2023-07-23

## 2023-01-31 ENCOUNTER — TELEPHONE (OUTPATIENT)
Dept: PRIMARY CARE CLINIC | Age: 53
End: 2023-01-31

## 2023-01-31 DIAGNOSIS — S88.111A BELOW-KNEE AMPUTATION OF RIGHT LOWER EXTREMITY (HCC): ICD-10-CM

## 2023-01-31 DIAGNOSIS — S88.112A BELOW-KNEE AMPUTATION OF LEFT LOWER EXTREMITY (HCC): Primary | ICD-10-CM

## 2023-01-31 NOTE — TELEPHONE ENCOUNTER
Spoke with [facility] faxed Order to [phone]. [facility] will be faxing CME forms needed for insurance coverage. Will need a statement form Elvira Beatty that states the need for new wheel chair is patient is now a double amputee.

## 2023-01-31 NOTE — TELEPHONE ENCOUNTER
Garo Valle from [facility] office number [phone] ext [phone] is calling in regards of this pt to get a new wheel chair previous wheel chair had one amputee now pt need a wheel chair order that has double amputee attachments.

## 2023-02-02 ENCOUNTER — CLINICAL DOCUMENTATION (OUTPATIENT)
Dept: PRIMARY CARE CLINIC | Age: 53
End: 2023-02-02

## (undated) DEVICE — ENDOSCOPIC KIT 6X3/16 FT COLON W/ 1.1 OZ 2 GWN W/O BRSH

## (undated) DEVICE — VALVE SUCTION AIR H2O SET ORCA POD + DISP

## (undated) DEVICE — FORCEPS BX L240CM WRK CHN 2.8MM STD CAP W/ NDL MIC MESH

## (undated) DEVICE — BW-412T DISP COMBO CLEANING BRUSH: Brand: SINGLE USE COMBINATION CLEANING BRUSH

## (undated) DEVICE — SOLUTION IV IRRIG WATER 500ML POUR BRL ST 2F7113

## (undated) DEVICE — MOUTHPIECE ENDOSCP L CTRL OPN AND SIDE PORTS DISP

## (undated) DEVICE — AIR/WATER CLEANING ADAPTER FOR OLYMPUS® GI ENDOSCOPE: Brand: BULLDOG®